# Patient Record
Sex: MALE | Race: WHITE | NOT HISPANIC OR LATINO | Employment: OTHER | URBAN - NONMETROPOLITAN AREA
[De-identification: names, ages, dates, MRNs, and addresses within clinical notes are randomized per-mention and may not be internally consistent; named-entity substitution may affect disease eponyms.]

---

## 2017-02-14 ENCOUNTER — AMBULATORY - GICH (OUTPATIENT)
Dept: FAMILY MEDICINE | Facility: OTHER | Age: 72
End: 2017-02-14

## 2017-02-14 ENCOUNTER — HISTORY (OUTPATIENT)
Dept: FAMILY MEDICINE | Facility: OTHER | Age: 72
End: 2017-02-14

## 2017-11-13 ENCOUNTER — HISTORY (OUTPATIENT)
Dept: FAMILY MEDICINE | Facility: OTHER | Age: 72
End: 2017-11-13

## 2017-11-13 ENCOUNTER — OFFICE VISIT - GICH (OUTPATIENT)
Dept: FAMILY MEDICINE | Facility: OTHER | Age: 72
End: 2017-11-13
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
## 2017-11-13 DIAGNOSIS — Z00.00 ENCOUNTER FOR GENERAL ADULT MEDICAL EXAMINATION WITHOUT ABNORMAL FINDINGS: ICD-10-CM

## 2017-11-13 DIAGNOSIS — Z82.49 FAMILY HISTORY OF ISCHEMIC HEART DISEASE AND OTHER DISEASES OF THE CIRCULATORY SYSTEM: ICD-10-CM

## 2017-11-13 DIAGNOSIS — E89.0 POSTPROCEDURAL HYPOTHYROIDISM: ICD-10-CM

## 2017-11-13 DIAGNOSIS — Z13.220 ENCOUNTER FOR SCREENING FOR LIPOID DISORDERS: ICD-10-CM

## 2017-11-13 LAB
CHOL/HDL RATIO - HISTORICAL: 2.93
CHOLESTEROL TOTAL: 199 MG/DL
HDLC SERPL-MCNC: 68 MG/DL (ref 23–92)
LDLC SERPL CALC-MCNC: 116 MG/DL
NON-HDL CHOLESTEROL - HISTORICAL: 131 MG/DL
PROVIDER ORDERDED STATUS - HISTORICAL: ABNORMAL
TRIGL SERPL-MCNC: 73 MG/DL
TSH - HISTORICAL: 1.51 UIU/ML (ref 0.34–5.6)

## 2017-11-13 ASSESSMENT — PATIENT HEALTH QUESTIONNAIRE - PHQ9: SUM OF ALL RESPONSES TO PHQ QUESTIONS 1-9: 0

## 2017-11-14 ENCOUNTER — HOSPITAL ENCOUNTER (OUTPATIENT)
Dept: RADIOLOGY | Facility: OTHER | Age: 72
End: 2017-11-14
Attending: FAMILY MEDICINE

## 2017-11-14 DIAGNOSIS — Z82.49 FAMILY HISTORY OF ISCHEMIC HEART DISEASE AND OTHER DISEASES OF THE CIRCULATORY SYSTEM: ICD-10-CM

## 2017-11-14 LAB — LYME SCREEN W/REFLEX WEST BLOT - HISTORICAL: NEGATIVE

## 2017-11-15 ENCOUNTER — COMMUNICATION - GICH (OUTPATIENT)
Dept: FAMILY MEDICINE | Facility: OTHER | Age: 72
End: 2017-11-15

## 2017-11-15 DIAGNOSIS — R93.1 ABNORMAL FINDINGS ON DIAGNOSTIC IMAGING OF HEART AND CORONARY CIRCULATION: ICD-10-CM

## 2017-12-27 NOTE — PROGRESS NOTES
Patient Information     Patient Name MRN Sex     Justino Wang [id number] Male 1945      Progress Notes by Fermín Mobley MD at 2017  9:00 AM     Author:  Fermín Mobley MD Service:  (none) Author Type:  Physician     Filed:  2017 11:10 AM Encounter Date:  2017 Status:  Signed     :  Fermín Mobley MD (Physician)            SUBJECTIVE:    Justino Wang is 72 y.o. male who presents for a physical and follow up on hypothyroidism. Taking levothyroxine regularly.    Notes more pain in hands. Wonders about Lyme disease since he is in the woods a lot and wife had Lyme disease before. No knee, shoulder, or hip swelling.    He has a FHx of CAD. Never on a statin due to high HDL. Denies CP or SOB.    PROBLEM LIST:  Patient Active Problem List     Diagnosis  Code     Grave's disease E05.00     Hypothyroidism following radioiodine therapy E89.0     Seborrheic keratoses L82.1     Actinic keratosis of scalp L57.0     Tubular adenoma of colon D12.6     PAST MEDICAL HISTORY:  Past Medical History:     Diagnosis  Date     Atrial fibrillation (HC)     [id number]/02      Grave's disease     I131 ablation       Herpes zoster 1982    right facial      Hypothyroidism following radioiodine therapy      Tubular adenoma of colon 2016     SURGICAL HISTORY:  Past Surgical History:      Procedure  Laterality Date     radiation on thyroid       VASECTOMY         SOCIAL HISTORY:  Social History     Social History        Marital status:       Spouse name: N/A     Number of children:  N/A     Years of education:  N/A     Occupational History      Not on file.     Social History Main Topics         Smoking status:  Never Smoker      Smokeless tobacco:  Never Used      Alcohol use  7.5 oz/week     15 Standard drinks or equivalent per week      Drug use:  No      Sexual activity:  Yes      Partners: Female      Other Topics  Concern     Not on file      Social History  "Narrative     FAMILY HISTORY:  Family History       Problem   Relation Age of Onset     Other  Mother      natural  92       Diabetes  Mother      Other  Father      pancreatic cancer  68  MI age 64        CURRENT MEDICATIONS:   No current outpatient prescriptions on file.     No current facility-administered medications for this visit.      Medications have been reviewed by me and are current to the best of my knowledge and ability.    ALLERGIES:  Review of patient's allergies indicates no known allergies.    REVIEW OF SYSTEMS:  General: Denies general constitutional problems  Eyes: Denies problems  Ears/Nose/Throat: Denies problems  Cardiovascular: Denies problems  Respiratory: Denies problems  Gastrointestinal: Denies problems  Genitourinary: Denies problems  Musculoskeletal: Denies problems  Skin: Denies problems  Neurologic: Denies problems  Psychiatric: Denies problems    PHQ Depression Screening 2017   Date of PHQ exam (doc flow) 2017   1. Lack of interest/pleasure 0 - Not at all   2. Feeling down/depressed 0 - Not at all   PHQ-2 TOTAL SCORE 0   3. Trouble sleeping 0 - Not at all   4. Decreased energy 0 - Not at all   5. Appetite change 0 - Not at all   6. Feelings of failure 0 - Not at all   7. Trouble concentrating 0 - Not at all   8. Activity level 0 - Not at all   9. Hurting yourself 0 - Not at all   PHQ-9 TOTAL SCORE 0   PHQ-9 Severity Level none   Functional Impairment not applicable   Some recent data might be hidden       OBJECTIVE:  /74  Pulse 58  Ht 1.715 m (5' 7.5\")  Wt 98.9 kg (218 lb)  BMI 33.64 kg/m2  EXAM:   General Appearance: Pleasant, alert, appropriate appearance for age. No acute distress  Eye Exam:  Normal external eye, conjunctiva, lids, cornea. ALEJANDRA.  Ear Exam: Normal TM's bilaterally.   OroPharynx Exam:  Dental hygiene adequate. Normal buccal mucosa. Normal pharynx.  Neck Exam:  Supple, no masses or nodes. No carotid bruits.  Thyroid Exam: No nodules or " enlargement.  Chest/Respiratory Exam: Normal chest wall and respirations. Clear to auscultation.  Cardiovascular Exam: Regular rate and rhythm. S1, S2, no murmur, click, gallop, or rubs.  Gastrointestinal Exam: Soft, non-tender, no masses or organomegaly.  Musculoskeletal Exam: Back is straight and non-tender, full ROM of upper and lower extremities.  Foot Exam: Left and right foot: good pedal pulses.  Skin: multiple SK over back. Some small benign nevi. Scalp appears normal,, no concerning lesions.  Neurologic Exam: Nonfocal, symmetric DTRs, normal gross motor, tone coordination and no tremor.  Psychiatric Exam: Alert and oriented - appropriate affect.    Results for orders placed or performed in visit on 11/13/17      TSH      Result  Value Ref Range    TSH 1.51 0.34 - 5.60 uIU/mL   LIPID PANEL      Result  Value Ref Range    CHOLESTEROL,TOTAL 199 <200 mg/dL    TRIGLYCERIDES 73 <150 mg/dL    HDL CHOLESTEROL 68 23 - 92 mg/dL    NON-HDL CHOLESTEROL 131 <145 mg/dl    CHOL/HDL RATIO            2.93 <4.50                    LDL CHOLESTEROL 116 (H) <100 mg/dL    PROVIDER ORDERED STATUS RANDOM    LYME SCREEN W/REFLEX      Result  Value Ref Range    LYME SCREEN W/REFLEX WEST BLOT Negative Negative        ASSESSMENT/PLAN:    ICD-10-CM    1. Annual physical exam Z00.00    2. Screening, lipid Z13.220 LIPID PANEL      LIPID PANEL   3. Family history of heart disease Z82.49 CT CARDIAC CALCIUM SCORE ONLY WO SINGLE READ   4. Hypothyroidism following radioiodine therapy E89.0 levothyroxine (SYNTHROID) 100 mcg tablet      TSH      TSH   5. Tick bite, initial encounter W57.XXXA LYME SCREEN W/REFLEX      LYME SCREEN W/REFLEX     Mr. Longoria Body mass index is Body mass index is 33.64 kg/(m^2). This is out of the normal range for a 72 y.o. Normal range for ages 18-64 is between 18.5 and 24.9; normal range for ages 65+ is 23-30. To lose weight we reviewed risks and benefits of appropriate options such as diet, exercise, and  medications. Patient's strategy will be  self-directed nutrition plan     BP Readings from Last 1 Encounters:11/09/16 : 129/74  Mr. Wang's blood pressure is in the normal range for adults.     Declines flu shot. HM current.    Lipids look good, but has FHx of CAD. His AHA risk is over 20%, but that is based on age. Discussed obtaining a CT calcium score to help grade risk. If elevated, then start statin and aspirin.    TSH in range, refilled levothyroxine.    He was curious about Lyme disease causing hand pain. Unlikely, but elected to check Lyme for reassurance. Returned negative.    F/U 1 year.

## 2017-12-28 NOTE — PROGRESS NOTES
Patient Information     Patient Name MRN Sex Justino Wu [id number] Male 1945      Progress Notes by Nery Woodward at 2017  8:03 AM     Author:  Nery Woodward Service:  (none) Author Type:  Other Clinical Staff     Filed:  2017  8:03 AM Date of Service:  2017  8:03 AM Status:  Signed     :  Nery Woodward (Other Clinical Staff)            Falls Risk Criteria:    Age 65 and older or under age 4        Sensory deficits    Poor vision    Use of ambulatory aides    Impaired judgment    Unable to walk independently    Meets High Risk criteria for falls:  Yes             1.  Do you have dizziness or vertigo?    no                    2.  Do you need help standing or walking?   no                 3.  Have you fallen within the last 6 months?    no           4.  Has the patient been fasting?      no       If any risks are marked Yes, the following interventions are utilized:    Do not leave patient unattended     Assist patient in the dressing room and bathroom    Have ambulatory aides available throughout procedure    Involve patient s family if available

## 2017-12-28 NOTE — PATIENT INSTRUCTIONS
Patient Information     Patient Name MRN Sex Justino Wu [id number] Male 1945      Patient Instructions by Fermín Mobley MD at 2017  9:00 AM     Author:  Fermín Mobley MD Service:  (none) Author Type:  Physician     Filed:  2017  9:45 AM Encounter Date:  2017 Status:  Signed     :  Fermín Mobley MD (Physician)            Letter with lab results  Consider the CT scan for calcium buildup in the heart arteries  Continue the levothyroxine

## 2017-12-30 NOTE — NURSING NOTE
Patient Information     Patient Name MRN Sex Justino Wu [id number] Male 1945      Nursing Note by Chantal Shaffer at 2017  9:00 AM     Author:  Chantal Shaffer Service:  (none) Author Type:  (none)     Filed:  2017  8:52 AM Encounter Date:  2017 Status:  Signed     :  Chantal Shaffer            Justino Wang is a 72 y.o. male presenting for his annual physical  Chantal Shaffer LPN 2017 8:38 AM

## 2018-01-01 ENCOUNTER — COMMUNICATION - GICH (OUTPATIENT)
Dept: FAMILY MEDICINE | Facility: OTHER | Age: 73
End: 2018-01-01

## 2018-01-01 DIAGNOSIS — E89.0 POSTPROCEDURAL HYPOTHYROIDISM: ICD-10-CM

## 2018-01-19 ENCOUNTER — DOCUMENTATION ONLY (OUTPATIENT)
Dept: FAMILY MEDICINE | Facility: OTHER | Age: 73
End: 2018-01-19

## 2018-01-19 PROBLEM — E05.00 GRAVES' DISEASE: Status: ACTIVE | Noted: 2018-01-19

## 2018-01-19 PROBLEM — R93.1 ELEVATED CORONARY ARTERY CALCIUM SCORE: Status: ACTIVE | Noted: 2017-11-15

## 2018-01-19 PROBLEM — E89.0 HYPOTHYROIDISM FOLLOWING RADIOIODINE THERAPY: Status: ACTIVE | Noted: 2018-01-19

## 2018-01-19 RX ORDER — LEVOTHYROXINE SODIUM 100 UG/1
100 TABLET ORAL
COMMUNITY
Start: 2017-11-13 | End: 2018-11-14

## 2018-01-19 RX ORDER — ASPIRIN 81 MG/1
81 TABLET ORAL
COMMUNITY
Start: 2017-11-16

## 2018-01-19 RX ORDER — ATORVASTATIN CALCIUM 40 MG/1
40 TABLET, FILM COATED ORAL
COMMUNITY
Start: 2017-11-15 | End: 2018-11-14

## 2018-01-26 VITALS
DIASTOLIC BLOOD PRESSURE: 74 MMHG | SYSTOLIC BLOOD PRESSURE: 129 MMHG | WEIGHT: 218 LBS | HEIGHT: 68 IN | BODY MASS INDEX: 33.04 KG/M2 | HEART RATE: 58 BPM

## 2018-01-29 ASSESSMENT — PATIENT HEALTH QUESTIONNAIRE - PHQ9: SUM OF ALL RESPONSES TO PHQ QUESTIONS 1-9: 0

## 2018-02-12 NOTE — TELEPHONE ENCOUNTER
Patient Information     Patient Name MRN Sex Justino Wu [id number] Male 1945      Telephone Encounter by Antoinette Price RN at 2018  8:42 AM     Author:  Antoinette Price RN Service:  (none) Author Type:  NURS- Registered Nurse     Filed:  2018  8:44 AM Encounter Date:  2018 Status:  Signed     :  Antoinette Price RN (NURS- Registered Nurse)            Redundant Refill Request refused;    Medication:levothyroxine (SYNTHROID) 100 mcg tablet    Qty:90 tablet   Ref:4  Start:2017  End:              Route:Oral                  JUSTICE:No   Class:eRx    Sig:Take 1 tablet by mouth before breakfast.    Pharmacy:[place] [id number] [place] [address]    Unable to complete prescription refill per RN Medication Refill Policy.................... Antoinette Price RN ....................  2018   8:43 AM

## 2018-02-14 ENCOUNTER — DOCUMENTATION ONLY (OUTPATIENT)
Dept: OTHER | Facility: CLINIC | Age: 73
End: 2018-02-14

## 2018-02-14 PROBLEM — Z71.89 ACP (ADVANCE CARE PLANNING): Chronic | Status: ACTIVE | Noted: 2018-02-14

## 2018-03-28 ENCOUNTER — TELEPHONE (OUTPATIENT)
Dept: FAMILY MEDICINE | Facility: OTHER | Age: 73
End: 2018-03-28

## 2018-03-28 NOTE — TELEPHONE ENCOUNTER
I think he needs to avoid maximal exertion. If his heart rate gets too close to maximum, this would be like a stress test and since he's not had one, we want to avoid getting too high of a heart rate without monitoring. So, I would say most exercise programs will not be an issue and so generally there is no restriction.

## 2018-03-28 NOTE — TELEPHONE ENCOUNTER
Patient wondering if there is any restriction in joining an exercise program.  Chantal Shaffer LPN 3/28/2018 3:17 PM

## 2018-03-29 NOTE — TELEPHONE ENCOUNTER
Patient wife notified that we will need to speak directly to patient.  She will have him call.  BERNARDINO Lorenzo........................3/29/2018  1:25 PM

## 2018-07-10 ENCOUNTER — HOSPITAL ENCOUNTER (EMERGENCY)
Facility: OTHER | Age: 73
Discharge: HOME | End: 2018-07-10
Attending: FAMILY MEDICINE | Admitting: FAMILY MEDICINE
Payer: MEDICARE

## 2018-07-10 ENCOUNTER — APPOINTMENT (OUTPATIENT)
Dept: GENERAL RADIOLOGY | Facility: OTHER | Age: 73
End: 2018-07-10
Attending: FAMILY MEDICINE
Payer: MEDICARE

## 2018-07-10 ENCOUNTER — TRANSFERRED RECORDS (OUTPATIENT)
Dept: HEALTH INFORMATION MANAGEMENT | Facility: OTHER | Age: 73
End: 2018-07-10

## 2018-07-10 VITALS
TEMPERATURE: 97.1 F | DIASTOLIC BLOOD PRESSURE: 83 MMHG | OXYGEN SATURATION: 96 % | HEIGHT: 68 IN | RESPIRATION RATE: 99 BRPM | WEIGHT: 212 LBS | SYSTOLIC BLOOD PRESSURE: 150 MMHG | BODY MASS INDEX: 32.13 KG/M2

## 2018-07-10 DIAGNOSIS — S52.022A OLECRANON FRACTURE, LEFT, CLOSED, INITIAL ENCOUNTER: Primary | ICD-10-CM

## 2018-07-10 PROCEDURE — 99284 EMERGENCY DEPT VISIT MOD MDM: CPT | Mod: 25 | Performed by: FAMILY MEDICINE

## 2018-07-10 PROCEDURE — 96372 THER/PROPH/DIAG INJ SC/IM: CPT | Performed by: FAMILY MEDICINE

## 2018-07-10 PROCEDURE — 25000125 ZZHC RX 250: Performed by: FAMILY MEDICINE

## 2018-07-10 PROCEDURE — 99283 EMERGENCY DEPT VISIT LOW MDM: CPT | Mod: 25 | Performed by: FAMILY MEDICINE

## 2018-07-10 PROCEDURE — 25000128 H RX IP 250 OP 636: Performed by: FAMILY MEDICINE

## 2018-07-10 PROCEDURE — 73080 X-RAY EXAM OF ELBOW: CPT | Mod: LT

## 2018-07-10 PROCEDURE — 99283 EMERGENCY DEPT VISIT LOW MDM: CPT | Mod: Z6 | Performed by: FAMILY MEDICINE

## 2018-07-10 RX ORDER — GINSENG 100 MG
CAPSULE ORAL ONCE
Status: COMPLETED | OUTPATIENT
Start: 2018-07-10 | End: 2018-07-10

## 2018-07-10 RX ORDER — CEFTRIAXONE SODIUM 1 G
1 VIAL (EA) INJECTION ONCE
Status: COMPLETED | OUTPATIENT
Start: 2018-07-10 | End: 2018-07-10

## 2018-07-10 RX ADMIN — Medication 3 ML: at 09:00

## 2018-07-10 RX ADMIN — BACITRACIN: 500 OINTMENT TOPICAL at 09:52

## 2018-07-10 RX ADMIN — CEFTRIAXONE SODIUM 1 G: 1 INJECTION, POWDER, FOR SOLUTION INTRAMUSCULAR; INTRAVENOUS at 10:31

## 2018-07-10 NOTE — ED TRIAGE NOTES
Patient arrives from home with c/o left elbow pain. He tripped and fell last evening landing on gravel and cement on his left arm. He comes in the it wrapped up as the bleeding would not stop. Dressing removed, small puncture wound noted that is actively bleeding. He is c/o left elbow and lower arm pain.

## 2018-07-10 NOTE — ED AVS SNAPSHOT
Justino Wang #[id number] (CSN: [id number])  (73 year old M)  (Adm: 07/10/18)     [id number]               [facility]: [phone]            Patient Demographics     Patient Name Sex          Age SSN Address Phone    Justino Wang Male 1945 (73 year old) [id number] [facility] [address] [phone] (Home)  [phone] (Mobile)      PCP and Center    Primary Care Provider  Phone Center     HymanChristian [phone] [facility] [address] [facility] *        Emergency Contact(s)     Name Relation Home Work Mobile    KathleenMary Raciel [phone]    Justino Wang  [phone]      Marley Valenzuela  [phone]        Admission Information     Attending Provider Admitting Provider Admission Type Admission Date/Time    Gerber Araiza MD  Emergency 07/10/18  0833    Discharge Date Hospital Service Auth/Cert Status Service Area     Emergency Medicine Incomplete [facility]    Unit Room/Bed Admission Status        EMERGENCY DEPT ED09/ED09 Admission (Confirmed)       Admission     Complaint    None      Hospital Account     Name Acct ID Class Status Primary Coverage    Justino Wang [id number] Emergency Open MEDICARE - MEDICARE            Guarantor Account (for Hospital Account #[id number])     Name Relation to Pt Service Area Active? Acct Type    Justino Wang Self FCS Yes Personal/Family    Address Phone          [address] [place] [address] [phone](H)              Coverage Information (for Hospital Account #[id number])     1. MEDICARE/MEDICARE     F/O Payor/Plan Precert #    MEDICARE/MEDICARE     Subscriber Subscriber #    Justino Wang [id number]    Address Phone    [employer]  [address]  [place] [address] [phone]          2. [place]     F/O Payor/Plan Precert #    [place]     Subscriber Subscriber #    Kathleen  "Justino CAMARILLO [id number]    Address Phone    [address] [address]  [place] [address] [phone]                                                      INTERAGENCY TRANSFER FORM - PHYSICIAN ORDERS   7/10/2018                       [facility]: [phone]            Attending Provider: Gerber Araiza MD     Allergies:  No Known Allergies    Infection:  None   Service:  EMERGENCY ME    Ht:  1.727 m (5' 8\")   Wt:  96.2 kg (212 lb)   Admission Wt:  96.2 kg (212 lb)    BMI:  32.23 kg/m 2   BSA:  2.15 m 2            ED Clinical Impression     Diagnosis Description Comment Added By Time Added    Olecranon fracture, left, closed, initial encounter [S52.022A] Olecranon fracture, left, closed, initial encounter [S52.022A]  Gerber Araiza MD 7/10/2018 11:19 AM      Hospital Problems as of 7/10/2018     None      Non-Hospital Problems as of 7/10/2018              Priority Class Noted    Tubular adenoma of colon Medium  1/11/2016    Actinic keratosis of scalp Medium  11/9/2016    Seborrheic keratoses Medium  11/9/2016    Elevated coronary artery calcium score Medium  11/15/2017    Graves' disease Medium  1/19/2018    Hypothyroidism following radioiodine therapy Medium  1/19/2018    ACP (advance care planning) Medium  2/14/2018      Code Status History     This patient does not have a recorded code status. Please follow your organizational policy for patients in this situation.      Current Code Status     This patient does not have a recorded code status. Please follow your organizational policy for patients in this situation.         Medication Review      UNREVIEWED medications. Ask your doctor about these medications        Dose / Directions Comments    aspirin 81 MG EC tablet        Dose:  81 mg   Take 81 mg by mouth daily with food Take 1 tablet by mouth once daily with a meal.   Refills:  0        atorvastatin 40 MG tablet   Commonly known as:  LIPITOR        Dose:  40 mg   Take 40 mg by mouth daily " "(with dinner) Take 1 tablet by mouth once daily with evening meal.   Refills:  0        levothyroxine 100 MCG tablet   Commonly known as:  SYNTHROID/LEVOTHROID        Dose:  100 mcg   Take 100 mcg by mouth every morning (before breakfast) Take 1 tablet by mouth before breakfast.   Refills:  0                                                    INTERAGENCY TRANSFER FORM - NURSING   7/10/2018                       [facility]: [phone]            Attending Provider: Gerber Araiza MD     Allergies:  No Known Allergies    Infection:  None   Service:  EMERGENCY ME    Ht:  1.727 m (5' 8\")   Wt:  96.2 kg (212 lb)   Admission Wt:  96.2 kg (212 lb)    BMI:  32.23 kg/m 2   BSA:  2.15 m 2            Advance Directives        Scanned docmt in ACP Activity?           Yes, scanned ACP docmt        Immunizations     None      ASSESSMENT     Discharge Profile Flowsheet     COMMUNICATION ASSESSMENT     Procedural focused assessment (identify areas inspected)   Elbow, left 07/10/18 0843    Patient's communication style  spoken language (English or Bilingual) 07/10/18 0833   Skin WDL  ex 07/10/18 0843    SKIN     Skin Integrity  wound(s) 07/10/18 0843    Inspection of bony prominences  Procedural focused assessment (identify areas inspected) 07/10/18 0843                      Assessment WDL (Within Defined Limits) Definitions           Skin WDL     Effective: 09/28/15    Row Information: <b>WDL Definition:</b> Warm; dry; intact; elastic; without discoloration; pressure points without redness<br> <font color=\"gray\"><i>Item=AS skin wdl>>List=AS skin wdl>>Version=F14</i></font>      Vitals     Vital Signs Flowsheet     VITAL SIGNS     FACES Pain Rating  0-->no hurt 07/10/18 0834    Temp  97.1  F (36.2  C) 07/10/18 0834   HEIGHT AND WEIGHT      Temp src  Tympanic 07/10/18 0834   Height  1.727 m (5' 8\") 07/10/18 0834    Resp  (!)  99 07/10/18 0834   Height Method  Stated 07/10/18 0834    Heart Rate  72 07/10/18 " 0834   Weight  96.2 kg (212 lb) 07/10/18 0834    Pulse/Heart Rate Source  Monitor 07/10/18 0834   BSA (Calculated - sq m)  2.15 07/10/18 0834    BP  150/83 07/10/18 1122   BMI (Calculated)  32.3 07/10/18 0834    OXYGEN THERAPY     RAUL COMA SCALE      SpO2  96 % 07/10/18 1122   Best Eye Response  4-->(E4) spontaneous 07/10/18 0834    O2 Device  None (Room air) 07/10/18 0834   Best Motor Response  6-->(M6) obeys commands 07/10/18 0834    PAIN/COMFORT     Best Verbal Response  5-->(V5) oriented 07/10/18 0834    Patient's Stated Pain Goal  No pain 07/10/18 0834   [place] Coma Scale Score  15 07/10/18 0834    0-10 Pain Scale  3 07/10/18 0834                 Patient Lines/Drains/Airways Status    Active LINES/DRAINS/AIRWAYS     None            Patient Lines/Drains/Airways Status    Active PICC/CVC     None            Intake/Output Detail Report     None      Case Management/Discharge Planning     Case Management/Discharge Planning Flowsheet     ABUSE RISK SCREEN     OBSERVATION: Is there reason to believe there has been maltreatment of a vulnerable adult (ie. Physical/Sexual/Emotional abuse, self neglect, lack of adequate food, shelter, medical care, or financial exploitation)?  no 07/10/18 0836    QUESTION TO PATIENT:  Has a member of your family or a partner(now or in the past) intimidated, hurt, manipulated, or controlled you in any way?  no 07/10/18 0836   HOMICIDE RISK      QUESTION TO PATIENT: Do you feel safe going back to the place where you are living?  yes 07/10/18 0836   Feels Like Hurting Others  no 07/10/18 0836                  [facility]: [phone]            Medication Administration Report for Justino Wang as of 07/10/18 1123   Legend:    Given Hold Not Given Due Canceled Entry Other Actions    Time Time (Time) Time  Time-Action       Inactive    Active    Linked        Medications 07/04/18 07/05/18 07/06/18 07/07/18 07/08/18 07/09/18 07/10/18   Completed Medications       Freq: ONCE Route: Top  Start: 07/10/18 0858   End: 07/10/18 0952   Admin Instructions: Apply to affected area    Last Admin: 07/10/18 0952  Dispense Loc: EMERGENCY DEPARTMENT  Administrations Remainin           0952 ( )-Given             Dose: 1 g  Freq: ONCE Route: IM  Indications of Use: SKIN AND SOFT TISSUE INFECTION  Start: 07/10/18 1023   End: 07/10/18 1031   Admin Instructions: Reconstitute 1 g vial with 2.1 mL of NS to yield a concentration of 350 mg/mL.    Admin. Amount: 1 g = 2.857 mL Conc: 0.35 g/mL  Last Admin: 07/10/18 103  Dispense Loc: EMERGENCY DEPARTMENT  Administrations Remainin  Volume: 2.857 mL           1031 (1 g)-Given             Dose: 3 mL  Freq: ONCE Route: Top  Start: 07/10/18 0858   End: 07/10/18 0900   Admin. Amount: 3 mL  Last Admin: 07/10/18 0900  Dispense Loc: EMERGENCY DEPARTMENT  Administrations Remainin  Volume: 3 mL           0900 (3 mL)-Given                    INTERAGENCY TRANSFER FORM - NOTES (H&P, Discharge Summary, Consults, Procedures, Therapies)   7/10/2018                       [facility]: [phone]            History & Physicals     No notes of this type exist for this encounter.      Discharge Summaries     No notes of this type exist for this encounter.      Consult Notes     No notes of this type exist for this encounter.      Progress Notes - Physician (Notes for yesterday and today)     No notes of this type exist for this encounter.      Procedure Notes     No notes of this type exist for this encounter.      Progress Notes - Therapies (Notes from 18 through 07/10/18)     No notes of this type exist for this encounter.                                          INTERAGENCY TRANSFER FORM - LAB / IMAGING / EKG / EMG RESULTS   7/10/2018                       [facility]: [phone]            Unresulted Labs     None         Imaging Results - 3 Days      XR Elbow Left G/E 3 Views [[id number]]  Resulted:  07/10/18 0953, Result status: Final result    Ordering provider: Gerber Araiza MD  07/10/18 0854 Resulted by: Meghan Albert MD    Performed: 07/10/18 0927 - 07/10/18 0949 Resulting lab: RADIOLOGY RESULTS    Narrative:       PROCEDURE:  XR ELBOW LT G/E 3 VW    HISTORY: r/o fracture;     COMPARISON:  None.    TECHNIQUE:  3 views of the left elbow were obtained.    FINDINGS:  There is a displaced fracture of the olecranon which is  displaced proximally by 1.7 cm. There is a marked soft tissue  laceration in the arm. No radiopaque foreign body.       Impression:       IMPRESSION: Displaced olecranon fracture. Adjacent soft tissue  laceration, raising the possibility of an open fracture.      MEGHAN ALBERT MD      Testing Performed By     Lab - Abbreviation Name Director Address Valid Date Range    104 - Rad Rslts RADIOLOGY RESULTS Unknown Unknown 02/16/05 1553 - Present            Encounter-Level Documents:     There are no encounter-level documents.      Order-Level Documents:     There are no order-level documents.

## 2018-07-10 NOTE — ED NOTES
Patient requesting to drive home and his wife will drive him to [facility] from there. Ok per Dr. Araiza. Transfer paperwork given to patient.

## 2018-07-10 NOTE — ED NOTES
Left elbow wound irrigated with sterile water. Bacitracin applied and wrapped with gauze and kerlix. Patient tolerated, declines pain medication. Pain 2/10. Waiting for results from Provider.

## 2018-07-10 NOTE — ED PROVIDER NOTES
History     Chief Complaint   Patient presents with     Arm Pain     HPI  Justino Wang is a 73 year old male who since the emergency department after a fall on his left elbow yesterday.  He did not hit his head or lose consciousness.  Reviewed nurse's notes below similar history related to me.  Patient arrives from home with c/o left elbow pain. He tripped and fell last evening landing on gravel and cement on his left arm. He comes in the it wrapped up as the bleeding would not stop. Dressing removed, small puncture wound noted that is actively bleeding. He is c/o left elbow and lower arm pain.                   Problem List:    Patient Active Problem List    Diagnosis Date Noted     ACP (advance care planning) 2018     Priority: Medium     Graves' disease 2018     Priority: Medium     Overview:   I131 ablation 2002       Hypothyroidism following radioiodine therapy 2018     Priority: Medium     Elevated coronary artery calcium score 11/15/2017     Priority: Medium     Actinic keratosis of scalp 2016     Priority: Medium     Seborrheic keratoses 2016     Priority: Medium     Tubular adenoma of colon 2016     Priority: Medium        Past Medical History:    Past Medical History:   Diagnosis Date     Atrial fibrillation (H)      Benign neoplasm of colon      Postprocedural hypothyroidism      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      Zoster without complications        Past Surgical History:    Past Surgical History:   Procedure Laterality Date     OTHER SURGICAL HISTORY      [id number],OTHER     VASECTOMY      No Comments Provided       Family History:    Family History   Problem Relation Age of Onset     Other - See Comments Mother      natural  92     Diabetes Mother      Diabetes     Other - See Comments Father      pancreatic cancer  68     HEART DISEASE Father 64     Heart Disease,MI age 64       Social History:  Marital Status:  Unknown  "[6]  Social History   Substance Use Topics     Smoking status: Never Smoker     Smokeless tobacco: Never Used     Alcohol use 7.5 oz/week      Comment: Daily 2 drinks        Medications:      aspirin EC 81 MG EC tablet   atorvastatin (LIPITOR) 40 MG tablet   levothyroxine (SYNTHROID/LEVOTHROID) 100 MCG tablet         Review of Systems  No fevers chills or shakes no nausea vomiting or diarrhea  Physical Exam   BP: 154/84  Heart Rate: 72  Temp: 97.1  F (36.2  C)  Resp: (!) 99  Height: 172.7 cm (5' 8\")  Weight: 96.2 kg (212 lb)  SpO2: 95 %      Physical Exam   Cardiovascular: Normal rate.    No murmur heard.  Pulmonary/Chest: Effort normal and breath sounds normal. No respiratory distress. He has no wheezes.   Abdominal: Soft. He exhibits no distension. There is no tenderness.   Musculoskeletal: He exhibits tenderness.        Left elbow: He exhibits decreased range of motion, swelling and laceration.   Nursing note and vitals reviewed.  1 cm linear laceration, no foreign body obvious.  Laceration will not be closed due to concern for infection.    ED Course     ED Course     Procedures            Results for orders placed or performed during the hospital encounter of 07/10/18 (from the past 24 hour(s))   XR Elbow Left G/E 3 Views    Narrative    PROCEDURE:  XR ELBOW LT G/E 3 VW    HISTORY: r/o fracture;     COMPARISON:  None.    TECHNIQUE:  3 views of the left elbow were obtained.    FINDINGS:  There is a displaced fracture of the olecranon which is  displaced proximally by 1.7 cm. There is a marked soft tissue  laceration in the arm. No radiopaque foreign body.       Impression    IMPRESSION: Displaced olecranon fracture. Adjacent soft tissue  laceration, raising the possibility of an open fracture.      MEGHAN HERNANDEZ MD       Medications   bacitracin ointment ( Topical Given 7/10/18 0952)   lidocaine/EPINEPHrine/tetracaine (LET) solution KIT 3 mL (3 mLs Topical Given 7/10/18 0900)   cefTRIAXone (ROCEPHIN) injection " 1 g (1 g Intramuscular Given 7/10/18 1031)       Assessments & Plan (with Medical Decision Making)       New Prescriptions    No medications on file       Final diagnoses:   Olecranon fracture, left, closed, initial encounter   [facility] will [facility] patient today.  We are both comfortable with the patient coming down by private vehicle but I did complete transfer paperwork.  X-rays have already been pushed down to [facility] and they were available for Dr. udkes consultation.  She verbalizes understanding of plan, his wife will bring him to [facility].  Dr. pitts anticipates open reduction internal fixation and irrigation today.  Patient is n.p.o., importance of n.p.o. status reinforced prior to his transfer as well.    7/10/2018   [facility]  07/10/18 4569

## 2018-07-10 NOTE — ED NOTES
Water taken away, patient waiting for wife. She will transport him to [facility] via private vehicle.

## 2018-07-11 ENCOUNTER — TRANSFERRED RECORDS (OUTPATIENT)
Dept: HEALTH INFORMATION MANAGEMENT | Facility: OTHER | Age: 73
End: 2018-07-11

## 2018-07-23 NOTE — PROGRESS NOTES
Patient Information     Patient Name  Justino Wang MRN  [id number] Sex  Male   1945      Letter by Fermín Mobley MD at      Author:  Fermín Mobley MD Service:  (none) Author Type:  (none)    Filed:   Encounter Date:  11/15/2017 Status:  (Other)           Justino Wang  [address] [facility] [address]          November 15, 2017    Dear Mr. Wang:    As we discussed the CT scan showed a large amount of calcium buildup in all of your heart arteries. The calcium indicates plaque buildup and so this puts you at high risk for heart attack. Start taking aspirin 81 mg and Lipitor 40 mg each evening. Checking on labs is of little benefit until next year and typically we do not repeat the scan as this would not change my recommendations to keep on the cholesterol medication.    Please contact me if you have any questions.    Sincerely,      Fermín Mobley MD  Reviewed and electronically signed by provider.

## 2018-07-24 NOTE — PROGRESS NOTES
Patient Information     Patient Name  Justino Wang MRN  [id number] Sex  Male   1945      Letter by Fermín Mobley MD at      Author:  Fermín Mobley MD Service:  (none) Author Type:  (none)    Filed:   Encounter Date:  2017 Status:  (Other)           Justino Wang  [address] [facility] [address]          2017    Dear Mr. Wang:    Your labs are included below. The thyroid test is normal. Lyme testing is negative. Cholesterol values look similar to your previous values with a high HDL, or good cholesterol, which is protective against heart attack and stroke. However, with a family history of heart disease the CT scan of calcium buildup in your heart arteries could help grade your risk for heart attack in the future. It is ordered and we will let you know about results when completed.    Please contact me if you have any questions.     Sincerely,      Fermín Mobley MD  Reviewed and electronically signed by provider.    Results for orders placed or performed in visit on 17      TSH      Result  Value Ref Range    TSH 1.51 0.34 - 5.60 uIU/mL   LIPID PANEL      Result  Value Ref Range    CHOLESTEROL,TOTAL 199 <200 mg/dL    TRIGLYCERIDES 73 <150 mg/dL    HDL CHOLESTEROL 68 23 - 92 mg/dL    NON-HDL CHOLESTEROL 131 <145 mg/dl    CHOL/HDL RATIO            2.93 <4.50                    LDL CHOLESTEROL 116 (H) <100 mg/dL    PROVIDER ORDERED STATUS RANDOM    LYME SCREEN W/REFLEX      Result  Value Ref Range    LYME SCREEN W/REFLEX WEST BLOT Negative Negative

## 2018-07-30 ENCOUNTER — TRANSFERRED RECORDS (OUTPATIENT)
Dept: HEALTH INFORMATION MANAGEMENT | Facility: OTHER | Age: 73
End: 2018-07-30

## 2018-08-27 ENCOUNTER — TRANSFERRED RECORDS (OUTPATIENT)
Dept: HEALTH INFORMATION MANAGEMENT | Facility: OTHER | Age: 73
End: 2018-08-27

## 2018-09-24 ENCOUNTER — TRANSFERRED RECORDS (OUTPATIENT)
Dept: HEALTH INFORMATION MANAGEMENT | Facility: OTHER | Age: 73
End: 2018-09-24

## 2018-11-14 ENCOUNTER — OFFICE VISIT (OUTPATIENT)
Dept: FAMILY MEDICINE | Facility: OTHER | Age: 73
End: 2018-11-14
Attending: FAMILY MEDICINE
Payer: MEDICARE

## 2018-11-14 VITALS
TEMPERATURE: 97 F | RESPIRATION RATE: 16 BRPM | DIASTOLIC BLOOD PRESSURE: 78 MMHG | HEART RATE: 78 BPM | SYSTOLIC BLOOD PRESSURE: 132 MMHG | BODY MASS INDEX: 33.19 KG/M2 | HEIGHT: 68 IN | WEIGHT: 219 LBS

## 2018-11-14 DIAGNOSIS — E89.0 HYPOTHYROIDISM FOLLOWING RADIOIODINE THERAPY: ICD-10-CM

## 2018-11-14 DIAGNOSIS — I70.90 ATHEROSCLEROSIS: ICD-10-CM

## 2018-11-14 DIAGNOSIS — Z51.81 ENCOUNTER FOR THERAPEUTIC DRUG MONITORING: ICD-10-CM

## 2018-11-14 DIAGNOSIS — Z00.00 ANNUAL PHYSICAL EXAM: Primary | ICD-10-CM

## 2018-11-14 DIAGNOSIS — R93.1 ELEVATED CORONARY ARTERY CALCIUM SCORE: ICD-10-CM

## 2018-11-14 DIAGNOSIS — Z23 NEED FOR SHINGLES VACCINE: ICD-10-CM

## 2018-11-14 LAB
ALBUMIN SERPL-MCNC: 4 G/DL (ref 3.5–5.7)
ALP SERPL-CCNC: 75 U/L (ref 34–104)
ALT SERPL W P-5'-P-CCNC: 12 U/L (ref 7–52)
ANION GAP SERPL CALCULATED.3IONS-SCNC: 7 MMOL/L (ref 3–14)
AST SERPL W P-5'-P-CCNC: 15 U/L (ref 13–39)
BILIRUB SERPL-MCNC: 0.7 MG/DL (ref 0.3–1)
BUN SERPL-MCNC: 14 MG/DL (ref 7–25)
CALCIUM SERPL-MCNC: 9.3 MG/DL (ref 8.6–10.3)
CHLORIDE SERPL-SCNC: 105 MMOL/L (ref 98–107)
CHOLEST SERPL-MCNC: 139 MG/DL
CO2 SERPL-SCNC: 28 MMOL/L (ref 21–31)
CREAT SERPL-MCNC: 0.87 MG/DL (ref 0.7–1.3)
GFR SERPL CREATININE-BSD FRML MDRD: 86 ML/MIN/1.7M2
GLUCOSE SERPL-MCNC: 114 MG/DL (ref 70–105)
HDLC SERPL-MCNC: 61 MG/DL (ref 23–92)
LDLC SERPL CALC-MCNC: 65 MG/DL
NONHDLC SERPL-MCNC: 78 MG/DL
POTASSIUM SERPL-SCNC: 4.3 MMOL/L (ref 3.5–5.1)
PROT SERPL-MCNC: 7 G/DL (ref 6.4–8.9)
SODIUM SERPL-SCNC: 140 MMOL/L (ref 134–144)
TRIGL SERPL-MCNC: 64 MG/DL
TSH SERPL DL<=0.05 MIU/L-ACNC: 1.79 IU/ML (ref 0.34–5.6)

## 2018-11-14 PROCEDURE — 84443 ASSAY THYROID STIM HORMONE: CPT | Performed by: FAMILY MEDICINE

## 2018-11-14 PROCEDURE — 80061 LIPID PANEL: CPT | Performed by: FAMILY MEDICINE

## 2018-11-14 PROCEDURE — G0463 HOSPITAL OUTPT CLINIC VISIT: HCPCS

## 2018-11-14 PROCEDURE — 36415 COLL VENOUS BLD VENIPUNCTURE: CPT | Performed by: FAMILY MEDICINE

## 2018-11-14 PROCEDURE — 80053 COMPREHEN METABOLIC PANEL: CPT | Performed by: FAMILY MEDICINE

## 2018-11-14 PROCEDURE — 99397 PER PM REEVAL EST PAT 65+ YR: CPT | Performed by: FAMILY MEDICINE

## 2018-11-14 RX ORDER — LEVOTHYROXINE SODIUM 100 UG/1
100 TABLET ORAL
Qty: 90 TABLET | Refills: 3 | Status: SHIPPED | OUTPATIENT
Start: 2018-11-14 | End: 2019-11-29

## 2018-11-14 RX ORDER — ATORVASTATIN CALCIUM 80 MG/1
80 TABLET, FILM COATED ORAL
Qty: 90 TABLET | Refills: 4 | Status: SHIPPED | OUTPATIENT
Start: 2018-11-14 | End: 2019-11-29

## 2018-11-14 ASSESSMENT — PAIN SCALES - GENERAL: PAINLEVEL: NO PAIN (0)

## 2018-11-14 NOTE — PROGRESS NOTES
SUBJECTIVE:   CC: Justino Wang is an 73 year old male who presents for preventative health visit.     Healthy Habits:    Do you get at least three servings of calcium containing foods daily (dairy, green leafy vegetables, etc.)? yes    Amount of exercise or daily activities, outside of work: stairs at work, not a lot of extra exercise    Problems taking medications regularly No    Medication side effects: No    Have you had an eye exam in the past two years? yes    Do you see a dentist twice per year? yes    Do you have sleep apnea, excessive snoring or daytime drowsiness? Snores, no witnessed apnea     If you drink alcohol do you typically have >3 drinks per day or >7 drinks per week? No       Today's PHQ-2 Score:   PHQ-2 ( 1999 [employer]) 11/14/2018   Q1: Little interest or pleasure in doing things 0   Q2: Feeling down, depressed or hopeless 0   PHQ-2 Score 0       Other problems:   Discussed calcium score of over 3000 last year and was started on aspirin and a statin. Not on either previously.    Had an elbow fracture in July, much improved.    Hx of thyroid ablation and on a stable dose of levothyroxine.      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       atorvastatin (LIPITOR) 40 MG tablet Take 40 mg by mouth daily (with dinner) Take 1 tablet by mouth once daily with evening meal.       levothyroxine (SYNTHROID/LEVOTHROID) 100 MCG tablet Take 100 mcg by mouth every morning (before breakfast) Take 1 tablet by mouth before breakfast.       No Known Allergies    Social History   Substance Use Topics     Smoking status: Never Smoker     Smokeless tobacco: Never Used     Alcohol use 7.5 oz/week      Comment: Daily 2 drinks        Reviewed and updated as needed this visit by clinical staff  Tobacco  Allergies  Meds  Med Hx  Surg Hx  Fam Hx  Soc Hx        Reviewed and updated as needed this visit by Provider  Allergies  Meds  Problems  Med Hx  Surg Hx  Fam Hx            ROS:   General: Denies general constitutional problems  Eyes: Denies problems  Ears/Nose/Throat: Denies problems  Cardiovascular: Denies problems  Respiratory: Denies problems  Gastrointestinal: Denies problems  Genitourinary: Denies problems  Musculoskeletal: Denies problems  Skin: Denies problems  Neurologic: Denies problems  Psychiatric: Denies problems      OBJECTIVE:   /78 (BP Location: Right arm, Patient Position: Chair, Cuff Size: Adult Regular)  Pulse 78  Temp 97  F (36.1  C) (Tympanic)  Resp 16  Ht 5' 7.5\" (1.715 m)  Wt 219 lb (99.3 kg)  BMI 33.79 kg/m2  EXAM:  General Appearance: Pleasant, alert, appropriate appearance for age. No acute distress  Eye Exam:  Normal external eye, conjunctiva, lids, cornea. ALEJANDRA.  Ear Exam: Normal TM's bilaterally. Normal auditory canals and external ears.  OroPharynx Exam:  Dental hygiene adequate. Normal buccal mucosa. Normal pharynx.  Neck Exam:  Supple, no masses or nodes. No carotid bruits.  Thyroid Exam: No nodules or enlargement.  Chest/Respiratory Exam: Normal chest wall and respirations. Clear to auscultation.  Cardiovascular Exam: Regular rate and rhythm. S1, S2, no murmur, click, gallop, or " rubs.  Gastrointestinal Exam: Soft, non-tender, no masses or organomegaly.  Musculoskeletal Exam: Back is straight and non-tender, full ROM of upper and lower extremities.  Foot Exam: Left and right foot: good pedal pulses.  Skin: no rash or abnormalities  Neurologic Exam: Nonfocal, symmetric DTRs, normal gross motor, tone coordination and no tremor.  Psychiatric Exam: Alert and oriented - appropriate affect.       Results for orders placed or performed in visit on 11/14/18   TSH   Result Value Ref Range    Thyrotropin 1.79 0.34 - 5.60 IU/mL   Lipid Panel   Result Value Ref Range    Cholesterol 139 <200 mg/dL    Triglycerides 64 <150 mg/dL    HDL Cholesterol 61 23 - 92 mg/dL    LDL Cholesterol Calculated 65 <100 mg/dL    Non HDL Cholesterol 78 <130 mg/dL   Comprehensive Metabolic Panel   Result Value Ref Range    Sodium 140 134 - 144 mmol/L    Potassium 4.3 3.5 - 5.1 mmol/L    Chloride 105 98 - 107 mmol/L    Carbon Dioxide 28 21 - 31 mmol/L    Anion Gap 7 3 - 14 mmol/L    Glucose 114 (H) 70 - 105 mg/dL    Urea Nitrogen 14 7 - 25 mg/dL    Creatinine 0.87 0.70 - 1.30 mg/dL    GFR Estimate 86 >60 mL/min/1.7m2    GFR Estimate If Black >90 >60 mL/min/1.7m2    Calcium 9.3 8.6 - 10.3 mg/dL    Bilirubin Total 0.7 0.3 - 1.0 mg/dL    Albumin 4.0 3.5 - 5.7 g/dL    Protein Total 7.0 6.4 - 8.9 g/dL    Alkaline Phosphatase 75 34 - 104 U/L    ALT 12 7 - 52 U/L    AST 15 13 - 39 U/L        ASSESSMENT/PLAN:       ICD-10-CM    1. Annual physical exam Z00.00    2. Need for shingles vaccine Z23 zoster vaccine recombinant adjuvanted (SHINGRIX) injection   3. Hypothyroidism following radioiodine therapy E89.0 levothyroxine (SYNTHROID/LEVOTHROID) 100 MCG tablet     TSH     TSH   4. Elevated coronary artery calcium score R93.1 atorvastatin (LIPITOR) 80 MG tablet     Lipid Panel     Comprehensive Metabolic Panel     Lipid Panel     Comprehensive Metabolic Panel   5. Encounter for therapeutic drug monitoring Z51.81 TSH     Lipid Panel      "Comprehensive Metabolic Panel     TSH     Lipid Panel     Comprehensive Metabolic Panel   6. Atherosclerosis  I70.90 Lipid Panel     Lipid Panel       COUNSELING:  Reviewed preventive health counseling, as reflected in patient instructions       Regular exercise       Healthy diet/nutrition       Immunizations    Consider Shingrix - check with pharmacy       Aspirin Prophylaxsis - taking due to high calcium score       Colon cancer screening - due 2021    Reviewed orders with patient. Reviewed health maintenance and updated orders accordingly - Yes    BP Readings from Last 1 Encounters:   11/14/18 132/78     Estimated body mass index is 33.79 kg/(m^2) as calculated from the following:    Height as of this encounter: 5' 7.5\" (1.715 m).    Weight as of this encounter: 219 lb (99.3 kg).    BP Screening:   Last 3 BP Readings:    BP Readings from Last 3 Encounters:   11/14/18 132/78   07/10/18 150/83   11/13/17 129/74       The following was recommended to the patient:  Re-screen BP within a year and recommended lifestyle modifications  Weight management plan: Discussed healthy diet and exercise guidelines and patient will follow up in 12 months in clinic to re-evaluate.     reports that he has never smoked. He has never used smokeless tobacco.    Checked TSH and WNL, refilled levothyroxine.    Checked labs due to atorvastatin use. Did not inquire about fasting, glucose is elevated to prediabetes range if fasting. Lipids 60 points better. Increase atorvastatin to 80 mg daily due to elevated calcium score. He declines cardiology referral and stress testing as outlined in AHA guidelines, but has not shown to change life expectancy to stress test based off calcium score, so acceptable to continue medical management.    Fermín Mobley MD  [facility] AND [facility]      "

## 2018-11-14 NOTE — PATIENT INSTRUCTIONS
Consider vitamin D3 1000 units daily  Consider Shingrix at the pharmacy  Increase Lipitor to 80 mg daily

## 2018-11-14 NOTE — LETTER
November 14, 2018      Justino Wang  [address] [address]        Dear ,    We are writing to inform you of your test results. The thyroid test is normal. Keep on your same dose of levothyroxine. Cholesterol values have declined by 60 points on atorvastatin. Try the 80 mg dose to see if you tolerate as a way to reduce chance of future heart attack or stroke. Electrolytes, liver, and kidney tests are fine.      I did not ask if you were fasting. If you were fasting the blood glucose is in the prediabetes range between 101-125. Weight loss, exercise, and watching carbohydrate intake will help prevent progression to diabetes. We can discuss further or you can meet with a dietician if desired. Plan to recheck in a year.      Resulted Orders   TSH   Result Value Ref Range    Thyrotropin 1.79 0.34 - 5.60 IU/mL   Lipid Panel   Result Value Ref Range    Cholesterol 139 <200 mg/dL    Triglycerides 64 <150 mg/dL    HDL Cholesterol 61 23 - 92 mg/dL    LDL Cholesterol Calculated 65 <100 mg/dL      Comment:      Desirable:       <100 mg/dl    Non HDL Cholesterol 78 <130 mg/dL   Comprehensive Metabolic Panel   Result Value Ref Range    Sodium 140 134 - 144 mmol/L    Potassium 4.3 3.5 - 5.1 mmol/L    Chloride 105 98 - 107 mmol/L    Carbon Dioxide 28 21 - 31 mmol/L    Anion Gap 7 3 - 14 mmol/L    Glucose 114 (H) 70 - 105 mg/dL    Urea Nitrogen 14 7 - 25 mg/dL    Creatinine 0.87 0.70 - 1.30 mg/dL    GFR Estimate 86 >60 mL/min/1.7m2    GFR Estimate If Black >90 >60 mL/min/1.7m2    Calcium 9.3 8.6 - 10.3 mg/dL    Bilirubin Total 0.7 0.3 - 1.0 mg/dL    Albumin 4.0 3.5 - 5.7 g/dL    Protein Total 7.0 6.4 - 8.9 g/dL    Alkaline Phosphatase 75 34 - 104 U/L    ALT 12 7 - 52 U/L    AST 15 13 - 39 U/L       If you have any questions or concerns, please call the clinic at the number listed above.       Sincerely,        Fermín Mobley MD

## 2018-11-14 NOTE — MR AVS SNAPSHOT
After Visit Summary   11/14/2018    Justino Wang    MRN: [id number]           Patient Information     Date Of Birth          1945        Visit Information        Provider Department      11/14/2018 10:00 AM Fermín Mobley MD [facility]        Today's Diagnoses     Annual physical exam    -  1    Need for shingles vaccine        Hypothyroidism following radioiodine therapy        Elevated coronary artery calcium score        Encounter for therapeutic drug monitoring        Atherosclerosis           Care Instructions    Consider vitamin D3 1000 units daily  Consider Shingrix at the pharmacy  Increase Lipitor to 80 mg daily          Follow-ups after your visit        Future tests that were ordered for you today     Open Future Orders        Priority Expected Expires Ordered    TSH Routine  11/14/2019 11/14/2018    Lipid Panel Routine  11/14/2019 11/14/2018    Comprehensive Metabolic Panel Routine  11/14/2019 11/14/2018            Who to contact     If you have questions or need follow up information about today's clinic visit or your schedule please contact [facility] directly at [phone].  Normal or non-critical lab and imaging results will be communicated to you by [employer], letter or phone within 4 business days after the clinic has received the results. If you do not hear from us within 7 days, please contact the clinic through [employer] or phone. If you have a critical or abnormal lab result, we will notify you by phone as soon as possible.  Submit refill requests through [employer] or call your pharmacy and they will forward the refill request to us. Please allow 3 business days for your refill to be completed.          Additional Information About Your Visit        [employer] Information     [employer] lets you send messages to your doctor, view your test results, renew your prescriptions, schedule appointments and more. To sign up, go to  [email or web address] . Click on \"Log in\" on the left side of the screen, which will take you to the Welcome page. Then click on \"Sign up Now\" on the right side of the page.     You will be asked to enter the access code listed below, as well as some personal information. Please follow the directions to create your username and password.     Your access code is: [email or web address]  Expires: 2019 10:42 AM     Your access code will  in 90 days. If you need help or a new code, please call your [place] clinic or [phone].        Care EveryWhere ID     This is your Care EveryWhere ID. This could be used by other organizations to access your [place] medical records  [id number]        Your Vitals Were     Pulse Temperature Respirations Height BMI (Body Mass Index)       78 97  F (36.1  C) (Tympanic) 16 5' 7.5\" (1.715 m) 33.79 kg/m2        Blood Pressure from Last 3 Encounters:   18 132/78   07/10/18 150/83   17 129/74    Weight from Last 3 Encounters:   18 219 lb (99.3 kg)   07/10/18 212 lb (96.2 kg)   17 218 lb (98.9 kg)                 Today's Medication Changes          These changes are accurate as of 18 10:42 AM.  If you have any questions, ask your nurse or doctor.               Start taking these medicines.        Dose/Directions    zoster vaccine recombinant adjuvanted injection   Commonly known as:  SHINGRIX   Used for:  Need for shingles vaccine   Started by:  Fermín Mobley MD        Dose:  1 each   Inject 0.5 mLs into the muscle once for 1 dose   Quantity:  0.5 mL   Refills:  0         These medicines have changed or have updated prescriptions.        Dose/Directions    atorvastatin 80 MG tablet   Commonly known as:  LIPITOR   This may have changed:    - medication strength  - how much to take  - additional instructions   Used for:  Elevated coronary artery calcium score   Changed by:  Fermín Mobley MD        Dose:  80 mg   Take 1 tablet (80 mg) by mouth " daily (with dinner)   Quantity:  90 tablet   Refills:  4            Where to get your medicines      These medications were sent to [facility] [address] [facility] - [place], [place] - [address]  [address] [place] [facility] [address]     Phone:  [phone]     atorvastatin 80 MG tablet    levothyroxine 100 MCG tablet    zoster vaccine recombinant adjuvanted injection                Primary Care Provider Office Phone # Fax #    Fermín Mobley [phone]       [address]  [facility] [address]        Equal Access to Services     [facility]: Hadii aad ku hadasho Soomaali, waaxda luqadaha, qaybta kaalmada adeegyada, andreia lopez . So [facility] [phone].    ATENCIÓN: Si habla español, tiene a augustin disposición servicios gratuitos de asistencia lingüística. Llame al [phone].    We comply with applicable federal civil rights laws and [place] laws. We do not discriminate on the basis of race, color, national origin, age, disability, sex, sexual orientation, or gender identity.            Thank you!     Thank you for choosing [facility] AND [facility] [place]  for your care. Our goal is always to provide you with excellent care. Hearing back from our patients is one way we can continue to improve our services. Please take a few minutes to complete the written survey that you may receive in the mail after your visit with us. Thank you!             Your Updated Medication List - Protect others around you: Learn how to safely use, store and throw away your medicines at www.disposemymeds.org.          This list is accurate as of 11/14/18 10:42 AM.  Always use your most recent med list.                   Brand Name Dispense Instructions for use Diagnosis    aspirin 81 MG EC tablet      Take 81 mg by mouth daily with food Take 1 tablet by mouth once daily with a meal.        atorvastatin 80 MG tablet    LIPITOR    90 tablet    Take 1 tablet (80 mg) by mouth  daily (with dinner)    Elevated coronary artery calcium score       levothyroxine 100 MCG tablet    SYNTHROID/LEVOTHROID    90 tablet    Take 1 tablet (100 mcg) by mouth every morning (before breakfast) Take 1 tablet by mouth before breakfast.    Hypothyroidism following radioiodine therapy       zoster vaccine recombinant adjuvanted injection    SHINGRIX    0.5 mL    Inject 0.5 mLs into the muscle once for 1 dose    Need for shingles vaccine

## 2018-11-29 RX ORDER — ATORVASTATIN CALCIUM 40 MG/1
TABLET, FILM COATED ORAL
Qty: 90 TABLET | Refills: 3 | OUTPATIENT
Start: 2018-11-29

## 2018-11-29 NOTE — TELEPHONE ENCOUNTER
Refill request from [facility] for:  atorvastatin (LIPITOR) 40 MG tablet     Filled 11/14/2018 for 12 month RX-receipt confirmed by pharmacy    Prescription refused.  This is a duplicate request.  Renetta Agosto.......11/29/2018 4:41 PM

## 2018-12-06 DIAGNOSIS — R93.1 ELEVATED CORONARY ARTERY CALCIUM SCORE: ICD-10-CM

## 2018-12-06 RX ORDER — ATORVASTATIN CALCIUM 40 MG/1
40 TABLET, FILM COATED ORAL
Qty: 90 TABLET | Refills: 3 | OUTPATIENT
Start: 2018-12-06

## 2018-12-06 NOTE — TELEPHONE ENCOUNTER
Writer received a faxed Rx request from [facility] in [facility] for Atorvastatin 40 mg-take one tablet by mouth once daily with evening meal. They are requesting refills. Chart review shows that patient with active Rx on file as noted below, as written on 11/14/18:    Medication Detail         Disp Refills Start End JUSTICE     atorvastatin (LIPITOR) 80 MG tablet 90 tablet 4 11/14/2018  No     Sig - Route: Take 1 tablet (80 mg) by mouth daily (with dinner) - Oral     Class: E-Prescribe     Order: [id number]     E-Prescribing Status: Receipt confirmed by pharmacy (11/14/2018 10:40 AM CST)       Printout Tracking      External Result Report       Pharmacy      [facility] [id number] [facility] - [place], [place] - [address]     Chart review shows that patient was seen by PCP on that date for an annual physical with plan as noted below:    Increase atorvastatin to 80 mg daily due to elevated calcium score.    Patient should be taking Rx as noted above and not Rx as requested from the pharmacy. Writer will refuse Rx request in this encounter and make note of Rx as noted above as well as dose increase in Rx refusal to pharmacy.    Unable to complete prescription refill per RN Medication Refill Policy. Hollis Mata 12/6/2018 2:27 PM

## 2019-10-24 DIAGNOSIS — E89.0 HYPOTHYROIDISM FOLLOWING RADIOIODINE THERAPY: ICD-10-CM

## 2019-10-28 RX ORDER — LEVOTHYROXINE SODIUM 100 UG/1
TABLET ORAL
Qty: 90 TABLET | Refills: 3 | OUTPATIENT
Start: 2019-10-28

## 2019-10-28 NOTE — TELEPHONE ENCOUNTER
[facility] is requesting a refill on the following medication:    LEVOTHYROXINE 100 MCG TABLET    Will file in chart as: levothyroxine (SYNTHROID/LEVOTHROID) 100 MCG tablet   Sig: TAKE 1 TABLET (100 MCG) BY MOUTH EVERY MORNING (BEFORE BREAKFAST)   Disp:  90 tablet    Refills:  3   Start: 10/24/2019   For: Hypothyroidism following radioiodine therapy   Last ordered: 11 months ago by Fermín Mobley MD Last refill: 7/27/2019   Thyroid Protocol Passed     Last Written Prescription Date:  11/14/18  Last Fill Quantity: 90,  # refills: 3   Last office visit: 11/14/2018 with prescribing provider:  Itz      Future Office Visit:   Next 5 appointments (look out 90 days)    Nov 26, 2019  9:30 AM CST  PHYSICAL with Fermín Mobley MD  [facility] [address]  [place] [address] [phone]         Call placed to patient to see if refill is needed, patient reports that he has enough to get to next appointment in November. Will refuse refill at this time.   Ginette Shi RN on 10/28/2019 at 9:28 AM

## 2019-11-26 ENCOUNTER — OFFICE VISIT (OUTPATIENT)
Dept: FAMILY MEDICINE | Facility: OTHER | Age: 74
End: 2019-11-26
Attending: FAMILY MEDICINE
Payer: MEDICARE

## 2019-11-26 VITALS
HEART RATE: 62 BPM | SYSTOLIC BLOOD PRESSURE: 122 MMHG | WEIGHT: 217.4 LBS | TEMPERATURE: 96.4 F | OXYGEN SATURATION: 96 % | DIASTOLIC BLOOD PRESSURE: 72 MMHG | HEIGHT: 68 IN | RESPIRATION RATE: 18 BRPM | BODY MASS INDEX: 32.95 KG/M2

## 2019-11-26 DIAGNOSIS — I70.90 ATHEROSCLEROSIS: ICD-10-CM

## 2019-11-26 DIAGNOSIS — Z00.00 ENCOUNTER FOR MEDICARE ANNUAL WELLNESS EXAM: Primary | ICD-10-CM

## 2019-11-26 DIAGNOSIS — R73.01 ELEVATED FASTING GLUCOSE: ICD-10-CM

## 2019-11-26 DIAGNOSIS — R93.1 ELEVATED CORONARY ARTERY CALCIUM SCORE: ICD-10-CM

## 2019-11-26 DIAGNOSIS — Z51.81 ENCOUNTER FOR THERAPEUTIC DRUG MONITORING: ICD-10-CM

## 2019-11-26 DIAGNOSIS — E89.0 HYPOTHYROIDISM FOLLOWING RADIOIODINE THERAPY: ICD-10-CM

## 2019-11-26 LAB
ALBUMIN SERPL-MCNC: 4.1 G/DL (ref 3.5–5.7)
ALP SERPL-CCNC: 86 U/L (ref 34–104)
ALT SERPL W P-5'-P-CCNC: 15 U/L (ref 7–52)
ANION GAP SERPL CALCULATED.3IONS-SCNC: 4 MMOL/L (ref 3–14)
AST SERPL W P-5'-P-CCNC: 18 U/L (ref 13–39)
BILIRUB SERPL-MCNC: 0.8 MG/DL (ref 0.3–1)
BUN SERPL-MCNC: 16 MG/DL (ref 7–25)
CALCIUM SERPL-MCNC: 9.2 MG/DL (ref 8.6–10.3)
CHLORIDE SERPL-SCNC: 105 MMOL/L (ref 98–107)
CHOLEST SERPL-MCNC: 142 MG/DL
CO2 SERPL-SCNC: 28 MMOL/L (ref 21–31)
CREAT SERPL-MCNC: 0.97 MG/DL (ref 0.7–1.3)
GFR SERPL CREATININE-BSD FRML MDRD: 75 ML/MIN/{1.73_M2}
GLUCOSE SERPL-MCNC: 112 MG/DL (ref 70–105)
HBA1C MFR BLD: 5.6 % (ref 4–6)
HDLC SERPL-MCNC: 61 MG/DL (ref 23–92)
LDLC SERPL CALC-MCNC: 70 MG/DL
NONHDLC SERPL-MCNC: 81 MG/DL
POTASSIUM SERPL-SCNC: 4.4 MMOL/L (ref 3.5–5.1)
PROT SERPL-MCNC: 7.2 G/DL (ref 6.4–8.9)
SODIUM SERPL-SCNC: 137 MMOL/L (ref 134–144)
TRIGL SERPL-MCNC: 53 MG/DL
TSH SERPL DL<=0.05 MIU/L-ACNC: 2.45 IU/ML (ref 0.34–5.6)

## 2019-11-26 PROCEDURE — G0463 HOSPITAL OUTPT CLINIC VISIT: HCPCS

## 2019-11-26 PROCEDURE — 80053 COMPREHEN METABOLIC PANEL: CPT | Mod: ZL | Performed by: FAMILY MEDICINE

## 2019-11-26 PROCEDURE — 84443 ASSAY THYROID STIM HORMONE: CPT | Mod: ZL | Performed by: FAMILY MEDICINE

## 2019-11-26 PROCEDURE — 83036 HEMOGLOBIN GLYCOSYLATED A1C: CPT | Mod: ZL | Performed by: FAMILY MEDICINE

## 2019-11-26 PROCEDURE — G0438 PPPS, INITIAL VISIT: HCPCS | Performed by: FAMILY MEDICINE

## 2019-11-26 PROCEDURE — 80061 LIPID PANEL: CPT | Mod: ZL | Performed by: FAMILY MEDICINE

## 2019-11-26 PROCEDURE — 36415 COLL VENOUS BLD VENIPUNCTURE: CPT | Mod: ZL | Performed by: FAMILY MEDICINE

## 2019-11-26 ASSESSMENT — PAIN SCALES - GENERAL: PAINLEVEL: NO PAIN (0)

## 2019-11-26 ASSESSMENT — MIFFLIN-ST. JEOR: SCORE: 1682.68

## 2019-11-26 NOTE — NURSING NOTE
Pt presents to clinic today for annual physical.      Medication Reconciliation: complete  Shantel Redman LPN

## 2019-11-26 NOTE — LETTER
November 26, 2019      Justino Wang  [address] [address]        Dear ,    We are writing to inform you of your test results. Cholesterol values are actually pretty similar to last year. The dose of cholesterol medication is usually maximized if we are concerned about vascular risk, so despite the lack of change, continue with atorvastatin 80 mg daily.    The thyroid test is in the normal range. Electrolytes, liver, and kidney tests are fine.      Your blood glucose is elevated in the prediabetes range. The A1c measures an average sugar over 3 months and is high normal. I would consider you to just be in the prediabetes range. Typically insulin resistance increases with age, so the sugars will climb over time. Certainly watching your sugar and carbohydrate intake would be best at this time. If you can make some small positive changes that may be sufficient at this time. If the levels increase next year, then we can discuss other more significant changes. I ordered labs for prior to next year's physical.    Resulted Orders   Lipid Panel   Result Value Ref Range    Cholesterol 142 <200 mg/dL    Triglycerides 53 <150 mg/dL    HDL Cholesterol 61 23 - 92 mg/dL    LDL Cholesterol Calculated 70 <100 mg/dL      Comment:      Desirable:       <100 mg/dl    Non HDL Cholesterol 81 <130 mg/dL   TSH Reflex GH   Result Value Ref Range    TSH Reflex 2.45 0.34 - 5.60 IU/mL   Comprehensive Metabolic Panel   Result Value Ref Range    Sodium 137 134 - 144 mmol/L    Potassium 4.4 3.5 - 5.1 mmol/L    Chloride 105 98 - 107 mmol/L    Carbon Dioxide 28 21 - 31 mmol/L    Anion Gap 4 3 - 14 mmol/L    Glucose 112 (H) 70 - 105 mg/dL    Urea Nitrogen 16 7 - 25 mg/dL    Creatinine 0.97 0.70 - 1.30 mg/dL    GFR Estimate 75 >60 mL/min/[1.73_m2]    GFR Estimate If Black >90 >60 mL/min/[1.73_m2]    Calcium 9.2 8.6 - 10.3 mg/dL    Bilirubin Total 0.8 0.3 - 1.0 mg/dL    Albumin 4.1 3.5 - 5.7 g/dL    Protein Total  7.2 6.4 - 8.9 g/dL    Alkaline Phosphatase 86 34 - 104 U/L    ALT 15 7 - 52 U/L    AST 18 13 - 39 U/L   Hemoglobin A1c   Result Value Ref Range    Hemoglobin A1C 5.6 4.0 - 6.0 %       If you have any questions or concerns, please call the clinic at the number listed above.       Sincerely,        Fermín Mobley MD

## 2019-11-26 NOTE — PATIENT INSTRUCTIONS
Get shingles (Shingrix) vaccine at a pharmacy  Labs via letter      Patient Education   Personalized Prevention Plan  You are due for the preventive services outlined below.  Your care team is available to assist you in scheduling these services.  If you have already completed any of these items, please share that information with your care team to update in your medical record.  Health Maintenance Due   Topic Date Due     Zoster (Shingles) Vaccine (2 of 3) 12/19/2012     PHQ-2  01/01/2019     Annual Wellness Visit  11/14/2019         Selected Food Sources of Magnesium       Food Milligrams  (mg) per  serving Percent  DV*   Almonds, dry roasted, 1 ounce 80 20   Spinach, boiled,   cup 78 20   Cashews, dry roasted, 1 ounce 74 19   Peanuts, oil roasted,   cup 63 16   Cereal, shredded wheat, 2 large biscuits 61 15   Soymilk, plain or vanilla, 1 cup 61 15   Black beans, cooked,   cup 60 15   Edamame, shelled, cooked,   cup 50 13   Peanut butter, smooth, 2 tablespoons 49 12   Bread, whole wheat, 2 slices 46 12   Avocado, cubed, 1 cup 44 11   Potato, baked with skin, 3.5 ounces 43 11   Rice, brown, cooked,   cup 42 11   Yogurt, plain, low fat, 8 ounces 42 11   Oatmeal, instant, 1 packet 36 9   Kidney beans, canned,   cup 35 9   Banana, 1 medium 32 8   [place], [place], farmed, cooked, 3 ounces 26 7   Milk, 1 cup 24-27 6-7   Halibut, cooked, 3 ounces 24 6   Raisins,   cup 23 6

## 2019-11-26 NOTE — PROGRESS NOTES
"SUBJECTIVE:   Justino Wang is a 74 year old male who presents for Preventive Visit.    Are you in the first 12 months of your Medicare Part B coverage?  No    Physical Health:    In general, how would you rate your overall physical health? good    Outside of work, how many days during the week do you exercise? 6-7 days/week    Outside of work, approximately how many minutes a day do you exercise?greater than 60 minutes    If you drink alcohol do you typically have >3 drinks per day or >7 drinks per week? No    Do you usually eat at least 4 servings of fruit and vegetables a day, include whole grains & fiber and avoid regularly eating high fat or \"junk\" foods? NO    Do you have any problems taking medications regularly?  No    Do you have any side effects from medications? none    Needs assistance for the following daily activities: no assistance needed    Which of the following safety concerns are present in your home?  none identified     Hearing impairment: No    In the past 6 months, have you been bothered by leaking of urine? no    Mental Health:    In general, how would you rate your overall mental or emotional health? good  PHQ-2 Score: 0    Do you feel safe in your environment? Yes    Have you ever done Advance Care Planning? (For example, a Health Directive, POLST, or a discussion with a medical provider or your loved ones about your wishes): Yes, advance care planning is on file.    Additional concerns to address?  YES, pt has been having some pain in his right calf    Fall risk:  Fallen 2 or more times in the past year?: No  Any fall with injury in the past year?: No    Cognitive Screenin) Repeat 3 items (Leader, Season, Table)    2) Clock draw: NORMAL  3) 3 item recall: Recalls 3 objects  Results: 3 items recalled: COGNITIVE IMPAIRMENT LESS LIKELY    Mini-CogTM Copyright JONNATHAN Soliman. Licensed by the author for use in [facility]; reprinted with permission (thomas@.edu). All rights " reserved.      Do you have sleep apnea, excessive snoring or daytime drowsiness?: no    PROBLEMS TO ADD ON...  Calcium score over 3000 in 2017. Started on aspirin and statin. Increased atorvastatin from 40 to 80 mg daily in 2018. Tolerating increase.    Hx of thyroid ablation and on a stable dose of levothyroxine.    Reviewed and updated as needed this visit by clinical staff  Tobacco  Allergies  Meds  Med Hx  Surg Hx  Fam Hx  Soc Hx        Reviewed and updated as needed this visit by Provider  Tobacco  Allergies  Meds  Problems  Med Hx  Surg Hx  Fam Hx        Social History     Tobacco Use     Smoking status: Never Smoker     Smokeless tobacco: Never Used   Substance Use Topics     Alcohol use: Yes     Alcohol/week: 12.5 standard drinks     Comment: Daily 2 drinks                           Current providers sharing in care for this patient include:   Patient Care Team:  Fermín Mobley MD as PCP - General (Family Practice)  Fermín Mobley MD as Assigned PCP    The following health maintenance items are reviewed in Epic and correct as of today:  Health Maintenance   Topic Date Due     ZOSTER IMMUNIZATION (2 of 3) 12/19/2012     PHQ-2  01/01/2019     INFLUENZA VACCINE (1) 09/01/2019     FALL RISK ASSESSMENT  11/14/2019     MEDICARE ANNUAL WELLNESS VISIT  11/14/2019     COLONOSCOPY  01/08/2021     DTAP/TDAP/TD IMMUNIZATION (4 - Td) 10/24/2022     ADVANCE CARE PLANNING  02/14/2023     LIPID  11/14/2023     PNEUMOCOCCAL IMMUNIZATION 65+ LOW/MEDIUM RISK  Completed     IPV IMMUNIZATION  Aged Out     MENINGITIS IMMUNIZATION  Aged Out     Patient Active Problem List   Diagnosis     Actinic keratosis of scalp     Elevated coronary artery calcium score     Graves' disease     Hypothyroidism following radioiodine therapy     Seborrheic keratoses     Tubular adenoma of colon     ACP (advance care planning)     Past Surgical History:   Procedure Laterality Date     C THYROID ABLATION       CLOSED RX ELBOW  "DISLOCATION  2018    Dr Reich     COLONOSCOPY  2016    Dr Rosa     VASECTOMY      No Comments Provided       Social History     Tobacco Use     Smoking status: Never Smoker     Smokeless tobacco: Never Used   Substance Use Topics     Alcohol use: Yes     Alcohol/week: 12.5 standard drinks     Comment: Daily 2 drinks     Family History   Problem Relation Age of Onset     Other - See Comments Mother         natural  92     Diabetes Mother         Diabetes     Other - See Comments Father         pancreatic cancer  68     Heart Disease Father 64        Heart Disease,MI age 64         Current Outpatient Medications   Medication Sig Dispense Refill     aspirin EC 81 MG EC tablet Take 81 mg by mouth daily with food Take 1 tablet by mouth once daily with a meal.       atorvastatin (LIPITOR) 80 MG tablet Take 1 tablet (80 mg) by mouth daily (with dinner) 90 tablet 4     levothyroxine (SYNTHROID/LEVOTHROID) 100 MCG tablet Take 1 tablet (100 mcg) by mouth every morning (before breakfast) Take 1 tablet by mouth before breakfast. 90 tablet 3     No Known Allergies    ROS:  General: Denies general constitutional problems  Eyes: Denies problems  Ears/Nose/Throat: Denies problems  Cardiovascular: Denies problems  Respiratory: Denies problems  Gastrointestinal: Denies problems  Genitourinary: Denies problems  Musculoskeletal: Denies problems  Skin: Denies problems  Musculoskeletal: Right shin pain intermittently for years, happens with more exertion, but not consistently  Neurologic: Sees sparkles in the left visual field at times. Lasts about 15 minutes. No vision loss. No headaches. Present for months, not worsening.  Psychiatric: Denies problems    OBJECTIVE:   /72   Pulse 62   Temp 96.4  F (35.8  C) (Temporal)   Resp 18   Ht 1.715 m (5' 7.5\")   Wt 98.6 kg (217 lb 6.4 oz)   SpO2 96%   BMI 33.55 kg/m   Estimated body mass index is 33.55 kg/m  as calculated from the following:    Height as of this " "encounter: 1.715 m (5' 7.5\").    Weight as of this encounter: 98.6 kg (217 lb 6.4 oz).  EXAM:   General Appearance: Pleasant, alert, appropriate appearance for age. No acute distress  Eye Exam:  Normal external eye, conjunctiva, lids, cornea. ALEJANDRA.  Ear Exam: Normal TM's bilaterally. Normal auditory canals and external ears.  OroPharynx Exam:  Dental hygiene adequate. Normal buccal mucosa. Normal pharynx.  Neck Exam:  Supple, no masses or nodes. No carotid bruits.  Thyroid Exam: No nodules or enlargement.  Chest/Respiratory Exam: Normal chest wall and respirations. Clear to auscultation.  Cardiovascular Exam: Regular rate and rhythm. S1, S2, no murmur, click, gallop, or rubs.  Gastrointestinal Exam: Soft, non-tender, no masses or organomegaly.  Musculoskeletal Exam: Back is straight and non-tender, full ROM of upper and lower extremities.  Foot Exam: Left and right foot: good pedal pulses.  Skin: no rash or abnormalities  Neurologic Exam: Nonfocal, symmetric DTRs, normal gross motor, tone coordination and no tremor.  Psychiatric Exam: Alert and oriented - appropriate affect.      Results for orders placed or performed in visit on 11/26/19   Lipid Panel     Status: None   Result Value Ref Range    Cholesterol 142 <200 mg/dL    Triglycerides 53 <150 mg/dL    HDL Cholesterol 61 23 - 92 mg/dL    LDL Cholesterol Calculated 70 <100 mg/dL    Non HDL Cholesterol 81 <130 mg/dL   TSH Reflex GH     Status: None   Result Value Ref Range    TSH Reflex 2.45 0.34 - 5.60 IU/mL   Comprehensive Metabolic Panel     Status: Abnormal   Result Value Ref Range    Sodium 137 134 - 144 mmol/L    Potassium 4.4 3.5 - 5.1 mmol/L    Chloride 105 98 - 107 mmol/L    Carbon Dioxide 28 21 - 31 mmol/L    Anion Gap 4 3 - 14 mmol/L    Glucose 112 (H) 70 - 105 mg/dL    Urea Nitrogen 16 7 - 25 mg/dL    Creatinine 0.97 0.70 - 1.30 mg/dL    GFR Estimate 75 >60 mL/min/[1.73_m2]    GFR Estimate If Black >90 >60 mL/min/[1.73_m2]    Calcium 9.2 8.6 - 10.3 " "mg/dL    Bilirubin Total 0.8 0.3 - 1.0 mg/dL    Albumin 4.1 3.5 - 5.7 g/dL    Protein Total 7.2 6.4 - 8.9 g/dL    Alkaline Phosphatase 86 34 - 104 U/L    ALT 15 7 - 52 U/L    AST 18 13 - 39 U/L   Hemoglobin A1c     Status: None   Result Value Ref Range    Hemoglobin A1C 5.6 4.0 - 6.0 %        ASSESSMENT / PLAN:       ICD-10-CM    1. Encounter for Medicare annual wellness exam Z00.00    2. Hypothyroidism following radioiodine therapy E89.0 TSH Reflex GH     TSH Reflex GH     TSH Reflex GH     levothyroxine (SYNTHROID/LEVOTHROID) 100 MCG tablet   3. Elevated coronary artery calcium score R93.1 Lipid Panel     Lipid Panel     Comprehensive Metabolic Panel     Lipid Panel     atorvastatin (LIPITOR) 80 MG tablet   4. Encounter for therapeutic drug monitoring Z51.81 Comprehensive Metabolic Panel     TSH Reflex GH     Lipid Panel     Lipid Panel     TSH Reflex GH     Comprehensive Metabolic Panel     Comprehensive Metabolic Panel     TSH Reflex GH   5. Elevated fasting glucose R73.01 Hemoglobin A1c     Hemoglobin A1c     Comprehensive Metabolic Panel   6. Atherosclerosis  I70.90 Lipid Panel     Lipid Panel     CBC W PLT No Diff     Lipid Panel     Past glucose elevated. Fasting glucose today in prediabetes range, A1c top normal. Recommend diet modifications to avoid progression to diabetes. If he worsens, then see dietician.    Refilled chronic medications      COUNSELING:  Reviewed preventive health counseling, as reflected in patient instructions       Regular exercise       Healthy diet/nutrition       Immunizations    Shingrix at pharmacy         Aspirin Prophylaxsis - taking due to high calcium score       Colon cancer screening - due 2021      Estimated body mass index is 33.55 kg/m  as calculated from the following:    Height as of this encounter: 1.715 m (5' 7.5\").    Weight as of this encounter: 98.6 kg (217 lb 6.4 oz).    Weight management plan: Discussed healthy diet and exercise guidelines     reports that he " has never smoked. He has never used smokeless tobacco.      Appropriate preventive services were discussed with this patient, including applicable screening as appropriate for cardiovascular disease, diabetes, osteopenia/osteoporosis, and glaucoma.  As appropriate for age/gender, discussed screening for colorectal cancer, prostate cancer, breast cancer, and cervical cancer. Checklist reviewing preventive services available has been given to the patient.    Reviewed patients plan of care and provided an AVS. The Basic Care Plan (routine screening as documented in Health Maintenance) for Justino meets the Care Plan requirement. This Care Plan has been established and reviewed with the Patient.    Counseling Resources:  ATP IV Guidelines  Pooled Cohorts Equation Calculator  Breast Cancer Risk Calculator  FRAX Risk Assessment  ICSI Preventive Guidelines  Dietary Guidelines for Americans, 2010  USDA's MyPlate  ASA Prophylaxis  Lung CA Screening    Fermín Mobley MD  [facility]

## 2019-11-29 RX ORDER — ATORVASTATIN CALCIUM 80 MG/1
80 TABLET, FILM COATED ORAL
Qty: 90 TABLET | Refills: 4 | Status: SHIPPED | OUTPATIENT
Start: 2019-11-29 | End: 2020-11-30

## 2019-11-29 RX ORDER — LEVOTHYROXINE SODIUM 100 UG/1
100 TABLET ORAL
Qty: 90 TABLET | Refills: 4 | Status: SHIPPED | OUTPATIENT
Start: 2019-11-29 | End: 2020-11-30

## 2019-12-13 ENCOUNTER — TELEPHONE (OUTPATIENT)
Dept: FAMILY MEDICINE | Facility: OTHER | Age: 74
End: 2019-12-13

## 2019-12-13 NOTE — TELEPHONE ENCOUNTER
Called patient and he states wife and him had some medical questions in reguards to their daughter. Phone note opened up in daughter chart and routed to Dr. Hernandez.  Silvano Mcclure LPN,..............12/13/2019 3:03 PM

## 2020-10-22 ENCOUNTER — ALLIED HEALTH/NURSE VISIT (OUTPATIENT)
Dept: FAMILY MEDICINE | Facility: OTHER | Age: 75
End: 2020-10-22
Payer: MEDICARE

## 2020-10-22 DIAGNOSIS — Z23 NEED FOR PROPHYLACTIC VACCINATION AND INOCULATION AGAINST INFLUENZA: Primary | ICD-10-CM

## 2020-10-22 PROCEDURE — G0008 ADMIN INFLUENZA VIRUS VAC: HCPCS

## 2020-11-30 ENCOUNTER — OFFICE VISIT (OUTPATIENT)
Dept: FAMILY MEDICINE | Facility: OTHER | Age: 75
End: 2020-11-30
Attending: FAMILY MEDICINE
Payer: MEDICARE

## 2020-11-30 VITALS
BODY MASS INDEX: 33.8 KG/M2 | HEART RATE: 64 BPM | TEMPERATURE: 96.9 F | SYSTOLIC BLOOD PRESSURE: 120 MMHG | HEIGHT: 68 IN | OXYGEN SATURATION: 96 % | WEIGHT: 223 LBS | RESPIRATION RATE: 20 BRPM | DIASTOLIC BLOOD PRESSURE: 78 MMHG

## 2020-11-30 DIAGNOSIS — R00.2 PALPITATIONS: ICD-10-CM

## 2020-11-30 DIAGNOSIS — R73.01 ELEVATED FASTING GLUCOSE: ICD-10-CM

## 2020-11-30 DIAGNOSIS — I70.90 ATHEROSCLEROSIS: ICD-10-CM

## 2020-11-30 DIAGNOSIS — Z00.00 ENCOUNTER FOR ANNUAL WELLNESS EXAM IN MEDICARE PATIENT: Primary | ICD-10-CM

## 2020-11-30 DIAGNOSIS — R93.1 ELEVATED CORONARY ARTERY CALCIUM SCORE: ICD-10-CM

## 2020-11-30 DIAGNOSIS — H91.93 BILATERAL HEARING LOSS, UNSPECIFIED HEARING LOSS TYPE: ICD-10-CM

## 2020-11-30 DIAGNOSIS — E89.0 HYPOTHYROIDISM FOLLOWING RADIOIODINE THERAPY: ICD-10-CM

## 2020-11-30 DIAGNOSIS — Z51.81 ENCOUNTER FOR THERAPEUTIC DRUG MONITORING: ICD-10-CM

## 2020-11-30 DIAGNOSIS — I44.0 1ST DEGREE AV BLOCK: ICD-10-CM

## 2020-11-30 LAB
ABO + RH BLD: NORMAL
ABO + RH BLD: NORMAL
ALBUMIN SERPL-MCNC: 4 G/DL (ref 3.5–5.7)
ALP SERPL-CCNC: 71 U/L (ref 34–104)
ALT SERPL W P-5'-P-CCNC: 17 U/L (ref 7–52)
ANION GAP SERPL CALCULATED.3IONS-SCNC: 7 MMOL/L (ref 3–14)
AST SERPL W P-5'-P-CCNC: 18 U/L (ref 13–39)
BILIRUB SERPL-MCNC: 0.7 MG/DL (ref 0.3–1)
BUN SERPL-MCNC: 15 MG/DL (ref 7–25)
CALCIUM SERPL-MCNC: 9.1 MG/DL (ref 8.6–10.3)
CHLORIDE SERPL-SCNC: 106 MMOL/L (ref 98–107)
CHOLEST SERPL-MCNC: 123 MG/DL
CO2 SERPL-SCNC: 27 MMOL/L (ref 21–31)
CREAT SERPL-MCNC: 0.94 MG/DL (ref 0.7–1.3)
ERYTHROCYTE [DISTWIDTH] IN BLOOD BY AUTOMATED COUNT: 12.1 % (ref 10–15)
GFR SERPL CREATININE-BSD FRML MDRD: 78 ML/MIN/{1.73_M2}
GLUCOSE SERPL-MCNC: 112 MG/DL (ref 70–105)
HBA1C MFR BLD: 5.4 % (ref 4–6)
HCT VFR BLD AUTO: 43.3 % (ref 40–53)
HDLC SERPL-MCNC: 51 MG/DL (ref 23–92)
HGB BLD-MCNC: 14.4 G/DL (ref 13.3–17.7)
LDLC SERPL CALC-MCNC: 60 MG/DL
MCH RBC QN AUTO: 32.4 PG (ref 26.5–33)
MCHC RBC AUTO-ENTMCNC: 33.3 G/DL (ref 31.5–36.5)
MCV RBC AUTO: 98 FL (ref 78–100)
NONHDLC SERPL-MCNC: 72 MG/DL
PLATELET # BLD AUTO: 269 10E9/L (ref 150–450)
POTASSIUM SERPL-SCNC: 4.6 MMOL/L (ref 3.5–5.1)
PROT SERPL-MCNC: 7.1 G/DL (ref 6.4–8.9)
RBC # BLD AUTO: 4.44 10E12/L (ref 4.4–5.9)
SODIUM SERPL-SCNC: 140 MMOL/L (ref 134–144)
SPECIMEN EXP DATE BLD: NORMAL
TRIGL SERPL-MCNC: 62 MG/DL
TSH SERPL DL<=0.05 MIU/L-ACNC: 0.93 IU/ML (ref 0.34–5.6)
WBC # BLD AUTO: 5.1 10E9/L (ref 4–11)

## 2020-11-30 PROCEDURE — 86901 BLOOD TYPING SEROLOGIC RH(D): CPT | Mod: ZL | Performed by: FAMILY MEDICINE

## 2020-11-30 PROCEDURE — G0439 PPPS, SUBSEQ VISIT: HCPCS | Performed by: FAMILY MEDICINE

## 2020-11-30 PROCEDURE — 86900 BLOOD TYPING SEROLOGIC ABO: CPT | Mod: ZL | Performed by: FAMILY MEDICINE

## 2020-11-30 PROCEDURE — 93005 ELECTROCARDIOGRAM TRACING: CPT

## 2020-11-30 PROCEDURE — 93010 ELECTROCARDIOGRAM REPORT: CPT | Performed by: INTERNAL MEDICINE

## 2020-11-30 PROCEDURE — 80053 COMPREHEN METABOLIC PANEL: CPT | Mod: ZL | Performed by: FAMILY MEDICINE

## 2020-11-30 PROCEDURE — 99212 OFFICE O/P EST SF 10 MIN: CPT | Mod: 25 | Performed by: FAMILY MEDICINE

## 2020-11-30 PROCEDURE — 80061 LIPID PANEL: CPT | Mod: ZL | Performed by: FAMILY MEDICINE

## 2020-11-30 PROCEDURE — 36415 COLL VENOUS BLD VENIPUNCTURE: CPT | Mod: ZL | Performed by: FAMILY MEDICINE

## 2020-11-30 PROCEDURE — 85027 COMPLETE CBC AUTOMATED: CPT | Mod: ZL | Performed by: FAMILY MEDICINE

## 2020-11-30 PROCEDURE — 84443 ASSAY THYROID STIM HORMONE: CPT | Mod: ZL | Performed by: FAMILY MEDICINE

## 2020-11-30 PROCEDURE — G0463 HOSPITAL OUTPT CLINIC VISIT: HCPCS | Mod: 25

## 2020-11-30 PROCEDURE — 83036 HEMOGLOBIN GLYCOSYLATED A1C: CPT | Mod: ZL | Performed by: FAMILY MEDICINE

## 2020-11-30 PROCEDURE — G0463 HOSPITAL OUTPT CLINIC VISIT: HCPCS

## 2020-11-30 RX ORDER — ATORVASTATIN CALCIUM 80 MG/1
80 TABLET, FILM COATED ORAL
Qty: 90 TABLET | Refills: 4 | Status: SHIPPED | OUTPATIENT
Start: 2020-11-30 | End: 2022-01-03

## 2020-11-30 RX ORDER — LEVOTHYROXINE SODIUM 100 UG/1
100 TABLET ORAL
Qty: 90 TABLET | Refills: 4 | Status: SHIPPED | OUTPATIENT
Start: 2020-11-30 | End: 2022-01-03

## 2020-11-30 ASSESSMENT — ANXIETY QUESTIONNAIRES
IF YOU CHECKED OFF ANY PROBLEMS ON THIS QUESTIONNAIRE, HOW DIFFICULT HAVE THESE PROBLEMS MADE IT FOR YOU TO DO YOUR WORK, TAKE CARE OF THINGS AT HOME, OR GET ALONG WITH OTHER PEOPLE: NOT DIFFICULT AT ALL
3. WORRYING TOO MUCH ABOUT DIFFERENT THINGS: NOT AT ALL
5. BEING SO RESTLESS THAT IT IS HARD TO SIT STILL: NOT AT ALL
2. NOT BEING ABLE TO STOP OR CONTROL WORRYING: NOT AT ALL
GAD7 TOTAL SCORE: 0
1. FEELING NERVOUS, ANXIOUS, OR ON EDGE: NOT AT ALL
6. BECOMING EASILY ANNOYED OR IRRITABLE: NOT AT ALL
7. FEELING AFRAID AS IF SOMETHING AWFUL MIGHT HAPPEN: NOT AT ALL

## 2020-11-30 ASSESSMENT — PATIENT HEALTH QUESTIONNAIRE - PHQ9
SUM OF ALL RESPONSES TO PHQ QUESTIONS 1-9: 0
5. POOR APPETITE OR OVEREATING: NOT AT ALL

## 2020-11-30 ASSESSMENT — MIFFLIN-ST. JEOR: SCORE: 1707.05

## 2020-11-30 ASSESSMENT — PAIN SCALES - GENERAL: PAINLEVEL: MODERATE PAIN (4)

## 2020-11-30 NOTE — PROGRESS NOTES
"SUBJECTIVE:   Justino Wang is a 75 year old male who presents for Preventive Visit.      Patient has been advised of split billing requirements and indicates understanding: Yes  Are you in the first 12 months of your Medicare Part B coverage?  No    Physical Health:    In general, how would you rate your overall physical health? good    Outside of work, how many days during the week do you exercise? 4-5 days/week    Outside of work, approximately how many minutes a day do you exercise?greater than 60 minutes    If you drink alcohol do you typically have >3 drinks per day or >7 drinks per week? No    Do you usually eat at least 4 servings of fruit and vegetables a day, include whole grains & fiber and avoid regularly eating high fat or \"junk\" foods? Yes and Yes    Do you have any problems taking medications regularly?  No    Do you have any side effects from medications? none    Needs assistance for the following daily activities: no assistance needed    Which of the following safety concerns are present in your home?  none identified     Hearing impairment: Yes, would like an audiology referral    In the past 6 months, have you been bothered by leaking of urine? yes    Mental Health:    In general, how would you rate your overall mental or emotional health? excellent  PHQ-2 Score:      Do you feel safe in your environment? Yes    Have you ever done Advance Care Planning? (For example, a Health Directive, POLST, or a discussion with a medical provider or your loved ones about your wishes): Yes, advance care planning is on file.    Additional concerns to address?  YES    Fall risk:  Fallen 2 or more times in the past year?: No  Any fall with injury in the past year?: No    Cognitive Screenin) Repeat 3 items (Leader, Season, Table)    2) Clock draw: NORMAL  3) 3 item recall: Recalls 3 objects  Results: 3 items recalled: COGNITIVE IMPAIRMENT LESS LIKELY    Mini-CogTM Copyright JONNATHAN Soliman. Licensed by the author " for use in [facility]; reprinted with permission (thomas@.[email or web address]). All rights reserved.      Do you have sleep apnea, excessive snoring or daytime drowsiness?: yes - no apneas, light snoring, tired end of the day    Calcium score over 3000 in 2017. Started on aspirin and statin. Increased atorvastatin from 40 to 80 mg daily in 2018. Tolerating increase.     Hx of thyroid ablation and on a stable dose of levothyroxine.    Notes some funny feeling in chest at time like funny heartbeats, but pulse normal when checked. No lightheadedness. No CP or SOB. He is able to climb stairs without concerning symptoms.    Shin pain persists, worse when swimming.        Reviewed and updated as needed this visit by clinical staff  Tobacco  Allergies  Meds   Med Hx  Surg Hx  Fam Hx  Soc Hx        Reviewed and updated as needed this visit by Provider  Tobacco  Allergies  Meds  Problems  Med Hx  Surg Hx  Fam Hx         Social History     Tobacco Use     Smoking status: Never Smoker     Smokeless tobacco: Never Used   Substance Use Topics     Alcohol use: Yes     Alcohol/week: 12.5 standard drinks     Comment: 1 shot daily                           Current providers sharing in care for this patient include:   Patient Care Team:  Fermín Mobley MD as PCP - General (Family Practice)  Fermín Mobley MD as Assigned PCP    The following health maintenance items are reviewed in Epic and correct as of today:  Health Maintenance   Topic Date Due     HEPATITIS C SCREENING  04/16/1963     ZOSTER IMMUNIZATION (2 of 3) 12/19/2012     FALL RISK ASSESSMENT  11/26/2020     MEDICARE ANNUAL WELLNESS VISIT  11/26/2020     COLORECTAL CANCER SCREENING  01/08/2021     DTAP/TDAP/TD IMMUNIZATION (4 - Td) 10/24/2022     LIPID  11/26/2024     ADVANCE CARE PLANNING  11/29/2024     PHQ-2  Completed     INFLUENZA VACCINE  Completed     Pneumococcal Vaccine: 65+ Years  Completed     Pneumococcal Vaccine: Pediatrics (0 to 5 Years) and  At-Risk Patients (6 to 64 Years)  Aged Out     IPV IMMUNIZATION  Aged Out     MENINGITIS IMMUNIZATION  Aged Out     HEPATITIS B IMMUNIZATION  Aged Out     Patient Active Problem List   Diagnosis     Actinic keratosis of scalp     Elevated coronary artery calcium score     Graves' disease     Hypothyroidism following radioiodine therapy     Seborrheic keratoses     Tubular adenoma of colon     ACP (advance care planning)     Past Surgical History:   Procedure Laterality Date     C THYROID ABLATION       CLOSED RX ELBOW DISLOCATION  2018    Dr Reich     COLONOSCOPY  2016    Dr Rosa     VASECTOMY      No Comments Provided       Social History     Tobacco Use     Smoking status: Never Smoker     Smokeless tobacco: Never Used   Substance Use Topics     Alcohol use: Yes     Alcohol/week: 12.5 standard drinks     Comment: 1 shot daily     Family History   Problem Relation Age of Onset     Other - See Comments Mother         natural  92     Diabetes Mother         Diabetes     Other - See Comments Father         pancreatic cancer  68     Heart Disease Father 64        Heart Disease,MI age 64         Current Outpatient Medications   Medication Sig Dispense Refill     aspirin EC 81 MG EC tablet Take 81 mg by mouth daily with food Take 1 tablet by mouth once daily with a meal.       atorvastatin (LIPITOR) 80 MG tablet Take 1 tablet (80 mg) by mouth daily (with dinner) 90 tablet 4     levothyroxine (SYNTHROID/LEVOTHROID) 100 MCG tablet Take 1 tablet (100 mcg) by mouth every morning (before breakfast) Take 1 tablet by mouth before breakfast. 90 tablet 4     No Known Allergies    ROS:  General: Denies general constitutional problems  Eyes: Denies problems  Ears/Nose/Throat: Denies problems  Cardiovascular: Denies problems  Respiratory: Denies problems  Gastrointestinal: Denies problems  Genitourinary: Denies problems  Musculoskeletal: see above  Skin: Denies problems  Neurologic: Denies problems  Psychiatric:  "Denies problems      OBJECTIVE:   There were no vitals taken for this visit. Estimated body mass index is 33.55 kg/m  as calculated from the following:    Height as of 11/26/19: 1.715 m (5' 7.5\").    Weight as of 11/26/19: 98.6 kg (217 lb 6.4 oz).  EXAM:   General Appearance: Pleasant, alert, appropriate appearance for age. No acute distress  Eye Exam:  Normal external eye, conjunctiva, lids, cornea. ALEJANDRA.  Ear Exam: Normal TM's bilaterally. Normal auditory canals and external ears.  OroPharynx Exam:  Dental hygiene adequate. Normal buccal mucosa. Normal pharynx.  Neck Exam:  Supple, no masses or nodes. No carotid bruits.  Thyroid Exam: No nodules or enlargement.  Chest/Respiratory Exam: Normal chest wall and respirations. Clear to auscultation.  Cardiovascular Exam: Regular rate and rhythm. S1, S2, no murmur, click, gallop, or rubs.  Gastrointestinal Exam: Soft, non-tender, no masses or organomegaly.  Musculoskeletal Exam: Tender in lateral shin (anterior tibialis) and anterior ankle tendons. No ankle effusion. Good ankle ROM.  Foot Exam: Left and right foot: good pedal pulses.  Skin: no rash or abnormalities  Neurologic Exam: Nonfocal, symmetric DTRs, normal gross motor, tone coordination and no tremor.  Psychiatric Exam: Alert and oriented - appropriate affect.      Results for orders placed or performed in visit on 11/30/20   Hemoglobin A1c     Status: None   Result Value Ref Range    Hemoglobin A1C 5.4 4.0 - 6.0 %   TSH Reflex GH     Status: None   Result Value Ref Range    TSH Reflex 0.93 0.34 - 5.60 IU/mL   Lipid Panel     Status: None   Result Value Ref Range    Cholesterol 123 <200 mg/dL    Triglycerides 62 <150 mg/dL    HDL Cholesterol 51 23 - 92 mg/dL    LDL Cholesterol Calculated 60 <100 mg/dL    Non HDL Cholesterol 72 <130 mg/dL   Comprehensive Metabolic Panel     Status: Abnormal   Result Value Ref Range    Sodium 140 134 - 144 mmol/L    Potassium 4.6 3.5 - 5.1 mmol/L    Chloride 106 98 - 107 mmol/L    " Carbon Dioxide 27 21 - 31 mmol/L    Anion Gap 7 3 - 14 mmol/L    Glucose 112 (H) 70 - 105 mg/dL    Urea Nitrogen 15 7 - 25 mg/dL    Creatinine 0.94 0.70 - 1.30 mg/dL    GFR Estimate 78 >60 mL/min/[1.73_m2]    GFR Estimate If Black >90 >60 mL/min/[1.73_m2]    Calcium 9.1 8.6 - 10.3 mg/dL    Bilirubin Total 0.7 0.3 - 1.0 mg/dL    Albumin 4.0 3.5 - 5.7 g/dL    Protein Total 7.1 6.4 - 8.9 g/dL    Alkaline Phosphatase 71 34 - 104 U/L    ALT 17 7 - 52 U/L    AST 18 13 - 39 U/L   CBC W PLT No Diff     Status: None   Result Value Ref Range    WBC 5.1 4.0 - 11.0 10e9/L    RBC Count 4.44 4.4 - 5.9 10e12/L    Hemoglobin 14.4 13.3 - 17.7 g/dL    Hematocrit 43.3 40.0 - 53.0 %    MCV 98 78 - 100 fl    MCH 32.4 26.5 - 33.0 pg    MCHC 33.3 31.5 - 36.5 g/dL    RDW 12.1 10.0 - 15.0 %    Platelet Count 269 150 - 450 10e9/L   ABO and Rh     Status: None   Result Value Ref Range    ABO A     RH(D) Pos     Specimen Expires 12/03/2020         EKG shows sinus bradycardia with 1st degree AV block 260 ms. No previous for comparison. No arrhythmia noted.    ASSESSMENT / PLAN:       ICD-10-CM    1. Encounter for annual wellness exam in Medicare patient  Z00.00 ABO and Rh     ABO and Rh   2. Hypothyroidism following radioiodine therapy  E89.0 levothyroxine (SYNTHROID/LEVOTHROID) 100 MCG tablet     TSH Reflex GH   3. Elevated coronary artery calcium score  R93.1 atorvastatin (LIPITOR) 80 MG tablet     Lipid Panel     Comprehensive Metabolic Panel   4. Palpitations  R00.2 EKG 12-lead, tracing only   5. 1st degree AV block  I44.0    6. Elevated fasting glucose  R73.01 Hemoglobin A1c     Hemoglobin A1c     Comprehensive Metabolic Panel   7. Encounter for therapeutic drug monitoring  Z51.81 TSH Reflex GH     Comprehensive Metabolic Panel   8. Atherosclerosis   I70.90 Lipid Panel     CBC W PLT No Diff   9. Bilateral hearing loss, unspecified hearing loss type  H91.93 AUDIOLOGY ADULT REFERRAL       Patient has been advised of split billing  "requirements and indicates understanding: Yes    EKG obtained due to palpitations and shows only 1st degree AV block. No other concerns. He has no irregular pulse noted. Certainly more evaluation for atrial fibrillation could be performed, but does not appear indicated based on current symptoms.    Labs obtained for medication monitoring and are stable.    Referral placed to audiology for bilateral hearing loss.  He will contemplate hearing aids.      COUNSELING:  Reviewed preventive health counseling, as reflected in patient instructions       Regular exercise       Healthy diet/nutrition       Vision screening       Hearing screening       Dental care       Bladder control       Fall risk prevention       Immunizations    Shingrix from pharmacy       Aspirin Prophylaxsis - yes for high calcium score       Colon cancer screening - due 2021    Estimated body mass index is 33.55 kg/m  as calculated from the following:    Height as of 11/26/19: 1.715 m (5' 7.5\").    Weight as of 11/26/19: 98.6 kg (217 lb 6.4 oz).    Weight management plan: Discussed healthy diet and exercise guidelines    He reports that he has never smoked. He has never used smokeless tobacco.    Appropriate preventive services were discussed with this patient, including applicable screening as appropriate for cardiovascular disease, diabetes, osteopenia/osteoporosis, and glaucoma.  As appropriate for age/gender, discussed screening for colorectal cancer, prostate cancer, breast cancer, and cervical cancer. Checklist reviewing preventive services available has been given to the patient.    Reviewed patients plan of care and provided an AVS. The Basic Care Plan (routine screening as documented in Health Maintenance) for Justino meets the Care Plan requirement. This Care Plan has been established and reviewed with the Patient.    Counseling Resources:  ATP IV Guidelines  Pooled Cohorts Equation Calculator  Breast Cancer Risk Calculator  BRCA-Related " Cancer Risk Assessment: FHS-7 Tool  FRAX Risk Assessment  ICSI Preventive Guidelines  Dietary Guidelines for Americans, 2010  Schoology's MyPlate  ASA Prophylaxis  Lung CA Screening    Fermín Mobley MD  [facility]

## 2020-11-30 NOTE — LETTER
December 1, 2020      Justino Wang  [address] [address]        Dear ,    We are writing to inform you of your test results. You have A+ blood type. The A1c is normal, not in the prediabetes or diabetes range. The blood sugar is elevated, but since you were not fasting when lab was drawn, we will use the A1c result.    Thyroid testing is normal range. Cholesterol values look good on medication. Electrolytes, liver, and kidney tests are fine.  Blood counts are normal.    Resulted Orders   ABO and Rh   Result Value Ref Range    ABO A     RH(D) Pos     Specimen Expires 12/03/2020    Hemoglobin A1c   Result Value Ref Range    Hemoglobin A1C 5.4 4.0 - 6.0 %   TSH Reflex GH   Result Value Ref Range    TSH Reflex 0.93 0.34 - 5.60 IU/mL   Lipid Panel   Result Value Ref Range    Cholesterol 123 <200 mg/dL    Triglycerides 62 <150 mg/dL    HDL Cholesterol 51 23 - 92 mg/dL    LDL Cholesterol Calculated 60 <100 mg/dL      Comment:      Desirable:       <100 mg/dl    Non HDL Cholesterol 72 <130 mg/dL   Comprehensive Metabolic Panel   Result Value Ref Range    Sodium 140 134 - 144 mmol/L    Potassium 4.6 3.5 - 5.1 mmol/L    Chloride 106 98 - 107 mmol/L    Carbon Dioxide 27 21 - 31 mmol/L    Anion Gap 7 3 - 14 mmol/L    Glucose 112 (H) 70 - 105 mg/dL    Urea Nitrogen 15 7 - 25 mg/dL    Creatinine 0.94 0.70 - 1.30 mg/dL    GFR Estimate 78 >60 mL/min/[1.73_m2]    GFR Estimate If Black >90 >60 mL/min/[1.73_m2]    Calcium 9.1 8.6 - 10.3 mg/dL    Bilirubin Total 0.7 0.3 - 1.0 mg/dL    Albumin 4.0 3.5 - 5.7 g/dL    Protein Total 7.1 6.4 - 8.9 g/dL    Alkaline Phosphatase 71 34 - 104 U/L    ALT 17 7 - 52 U/L    AST 18 13 - 39 U/L   CBC W PLT No Diff   Result Value Ref Range    WBC 5.1 4.0 - 11.0 10e9/L    RBC Count 4.44 4.4 - 5.9 10e12/L    Hemoglobin 14.4 13.3 - 17.7 g/dL    Hematocrit 43.3 40.0 - 53.0 %    MCV 98 78 - 100 fl    MCH 32.4 26.5 - 33.0 pg    MCHC 33.3 31.5 - 36.5 g/dL    RDW 12.1 10.0 -  15.0 %    Platelet Count 269 150 - 450 10e9/L       If you have any questions or concerns, please call the clinic at the number listed above.       Sincerely,    Fermín Mobley MD  Electronically signed

## 2020-12-01 PROBLEM — I44.0 1ST DEGREE AV BLOCK: Status: ACTIVE | Noted: 2020-12-01

## 2020-12-01 LAB — INTERPRETATION ECG - MUSE: NORMAL

## 2020-12-01 ASSESSMENT — ANXIETY QUESTIONNAIRES: GAD7 TOTAL SCORE: 0

## 2020-12-11 ENCOUNTER — TRANSFERRED RECORDS (OUTPATIENT)
Dept: HEALTH INFORMATION MANAGEMENT | Facility: OTHER | Age: 75
End: 2020-12-11

## 2021-01-18 ENCOUNTER — MYC MEDICAL ADVICE (OUTPATIENT)
Dept: FAMILY MEDICINE | Facility: OTHER | Age: 76
End: 2021-01-18

## 2021-02-09 ENCOUNTER — IMMUNIZATION (OUTPATIENT)
Dept: FAMILY MEDICINE | Facility: OTHER | Age: 76
End: 2021-02-09
Attending: FAMILY MEDICINE
Payer: MEDICARE

## 2021-02-09 PROCEDURE — 91300 PR COVID VAC PFIZER DIL RECON 30 MCG/0.3 ML IM: CPT

## 2021-03-02 ENCOUNTER — IMMUNIZATION (OUTPATIENT)
Dept: FAMILY MEDICINE | Facility: OTHER | Age: 76
End: 2021-03-02
Attending: FAMILY MEDICINE
Payer: MEDICARE

## 2021-03-02 PROCEDURE — 91300 PR COVID VAC PFIZER DIL RECON 30 MCG/0.3 ML IM: CPT

## 2021-05-02 ENCOUNTER — ALLIED HEALTH/NURSE VISIT (OUTPATIENT)
Dept: FAMILY MEDICINE | Facility: OTHER | Age: 76
End: 2021-05-02
Attending: FAMILY MEDICINE
Payer: MEDICARE

## 2021-05-02 DIAGNOSIS — Z20.822 COVID-19 RULED OUT: Primary | ICD-10-CM

## 2021-05-02 LAB
LABORATORY COMMENT REPORT: NORMAL
SARS-COV-2 RNA RESP QL NAA+PROBE: NEGATIVE
SARS-COV-2 RNA RESP QL NAA+PROBE: NORMAL
SPECIMEN SOURCE: NORMAL
SPECIMEN SOURCE: NORMAL

## 2021-05-02 PROCEDURE — C9803 HOPD COVID-19 SPEC COLLECT: HCPCS

## 2021-05-02 PROCEDURE — U0003 INFECTIOUS AGENT DETECTION BY NUCLEIC ACID (DNA OR RNA); SEVERE ACUTE RESPIRATORY SYNDROME CORONAVIRUS 2 (SARS-COV-2) (CORONAVIRUS DISEASE [COVID-19]), AMPLIFIED PROBE TECHNIQUE, MAKING USE OF HIGH THROUGHPUT TECHNOLOGIES AS DESCRIBED BY CMS-2020-01-R: HCPCS | Mod: ZL | Performed by: FAMILY MEDICINE

## 2021-05-02 PROCEDURE — U0005 INFEC AGEN DETEC AMPLI PROBE: HCPCS | Mod: ZL | Performed by: FAMILY MEDICINE

## 2021-05-06 ENCOUNTER — TRANSFERRED RECORDS (OUTPATIENT)
Dept: HEALTH INFORMATION MANAGEMENT | Facility: OTHER | Age: 76
End: 2021-05-06

## 2021-09-05 ENCOUNTER — HOSPITAL ENCOUNTER (EMERGENCY)
Facility: OTHER | Age: 76
Discharge: HOME | End: 2021-09-05
Attending: STUDENT IN AN ORGANIZED HEALTH CARE EDUCATION/TRAINING PROGRAM | Admitting: STUDENT IN AN ORGANIZED HEALTH CARE EDUCATION/TRAINING PROGRAM
Payer: MEDICARE

## 2021-09-05 ENCOUNTER — APPOINTMENT (OUTPATIENT)
Dept: GENERAL RADIOLOGY | Facility: OTHER | Age: 76
End: 2021-09-05
Attending: STUDENT IN AN ORGANIZED HEALTH CARE EDUCATION/TRAINING PROGRAM
Payer: MEDICARE

## 2021-09-05 VITALS
WEIGHT: 222.66 LBS | RESPIRATION RATE: 18 BRPM | HEART RATE: 56 BPM | BODY MASS INDEX: 34.11 KG/M2 | SYSTOLIC BLOOD PRESSURE: 170 MMHG | OXYGEN SATURATION: 95 % | TEMPERATURE: 97 F | DIASTOLIC BLOOD PRESSURE: 77 MMHG

## 2021-09-05 DIAGNOSIS — M25.461 EFFUSION OF RIGHT KNEE: ICD-10-CM

## 2021-09-05 DIAGNOSIS — M25.561 ACUTE PAIN OF RIGHT KNEE: ICD-10-CM

## 2021-09-05 PROCEDURE — 73562 X-RAY EXAM OF KNEE 3: CPT | Mod: RT

## 2021-09-05 PROCEDURE — 99282 EMERGENCY DEPT VISIT SF MDM: CPT | Performed by: STUDENT IN AN ORGANIZED HEALTH CARE EDUCATION/TRAINING PROGRAM

## 2021-09-05 PROCEDURE — 99283 EMERGENCY DEPT VISIT LOW MDM: CPT | Performed by: STUDENT IN AN ORGANIZED HEALTH CARE EDUCATION/TRAINING PROGRAM

## 2021-09-05 NOTE — DISCHARGE INSTRUCTIONS
Like we discussed, I suspect that you have a meniscal injury in your knee.  Your x-ray did not show any fracture.  Use crutches provided here to take some of the pressure off your leg when walking.  Use ibuprofen and ice applied to the affected area for pain relief.  Please call [phone] to schedule your appointment with orthopedics here in [place].

## 2021-09-05 NOTE — ED PROVIDER NOTES
Emergency Department Provider Note  : 1945 Age: 76 year old Sex: male MRN: [id number]    Chief Complaint   Patient presents with     Knee Pain       Medical Decision Making / Assessment / Plan   76 year old male who presents with acute right knee injury and obvious joint effusion after hearing a pop while twisting his knee.  Exam shows no increased joint laxity and anterior and posterior drawer test are negative.  I am unable to reproduce any clicking but based on patient story and exam, suspect meniscal injury.  Will send for x-ray to rule out fracture and suspect the patient will be discharged with crutches, instructions for NSAID and ice application, and orthopedic follow-up.  No indication for emergent MRI here today.    As expected, x-ray shows joint effusion but no fracture.  Given crutches and discharged with Ortho follow-up.    The patient was informed of the plan and verbalized understanding and agreed with the plan. The patient was given strict return to Emergency Department precautions as well as appropriate follow up instructions. The patient was discharged in stable condition.    Discharge Medication List as of 2021 12:56 PM          Final diagnoses:   Effusion of right knee   Acute pain of right knee       Terry Gardner MD  2021   Emergency Department    Alena Badillo is a 76 year old male with PMH of Graves' who presents at 11:35 AM for evaluation of acute onset right knee pain yesterday after twisting his knee, hearing a pop, and falling on a dock.  No other significant trauma.  Following this injury, he has been able to ambulate without significant difficulty and swim without any significant pain.  He noticed increasing swelling this morning and has been using a cane to help ambulate as he has little bit of pain in the right knee focally when doing so.  No other injuries.  No prior surgeries on her knee.  No numbness, tingling, weakness.    I have reviewed the Medications,  Allergies, Past Medical and Surgical History, and Social History in the Epic System and with family.    Review of Systems:  Please see HPI for pertinent positives and negatives. All other systems reviewed and found to be negative.      Objective   BP: (!) 170/77  Pulse: 56  Temp: 97  F (36.1  C)  Resp: 18  Weight: 101 kg (222 lb 10.6 oz)  SpO2: 95 %    Physical Exam:   Gen: Comfortable. NAD  HEENT: MMM. AT/NC.  Eye: PERRL  CV:  Well-perfused right lower extremity with intact DP pulse.  Pulm: Nonlabored, equal chest rise  Abd: ND. No apparent trauma.  Ext:  Obvious joint effusion moderate in size of the right knee.  Mild reproducible pain diffusely across the joint line without focality.  No increased joint laxity with valgus or varus stress.  Negative posterior and anterior drawer test.  Negative Lachman's.  No focal pain over the patella.  Full range of motion of the right knee without significant discomfort.  Neuro:  Motor and sensation grossly intact in the right lower extremity.  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     Current Outpatient Medications   Medication     aspirin EC 81  MG EC tablet     atorvastatin (LIPITOR) 80 MG tablet     levothyroxine (SYNTHROID/LEVOTHROID) 100 MCG tablet       Allergies:  Patient has no known allergies.    Relevant labs, images, EKGs, Epic and outside hospital (if applicable) charts were reviewed. The findings, diagnosis, plan, and need for follow up were discussed with the patient/family. Nursing notes were reviewed.

## 2021-09-05 NOTE — ED TRIAGE NOTES
"Patient presents to ED, ambulatory from home for c/o injuring right knee yesterday afternoon. States he was sitting on his dock and was trying to get up and ended up twisting his right leg - felt a \"pop\" and has been having increased swelling and pain to right knee since.  "

## 2021-09-20 ENCOUNTER — OFFICE VISIT (OUTPATIENT)
Dept: ORTHOPEDICS | Facility: OTHER | Age: 76
End: 2021-09-20
Attending: STUDENT IN AN ORGANIZED HEALTH CARE EDUCATION/TRAINING PROGRAM
Payer: MEDICARE

## 2021-09-20 VITALS
DIASTOLIC BLOOD PRESSURE: 68 MMHG | WEIGHT: 222 LBS | RESPIRATION RATE: 16 BRPM | BODY MASS INDEX: 34 KG/M2 | SYSTOLIC BLOOD PRESSURE: 148 MMHG | OXYGEN SATURATION: 96 % | HEART RATE: 56 BPM

## 2021-09-20 DIAGNOSIS — M25.561 RIGHT KNEE PAIN, UNSPECIFIED CHRONICITY: Primary | ICD-10-CM

## 2021-09-20 DIAGNOSIS — M54.16 RIGHT LUMBAR RADICULOPATHY: ICD-10-CM

## 2021-09-20 PROCEDURE — 99203 OFFICE O/P NEW LOW 30 MIN: CPT | Performed by: ORTHOPAEDIC SURGERY

## 2021-09-20 PROCEDURE — G0463 HOSPITAL OUTPT CLINIC VISIT: HCPCS

## 2021-09-20 RX ORDER — COVID-19 ANTIGEN TEST
KIT MISCELLANEOUS
Status: ON HOLD | COMMUNITY
Start: 2021-01-01 | End: 2023-02-22

## 2021-09-20 ASSESSMENT — PAIN SCALES - GENERAL: PAINLEVEL: MILD PAIN (2)

## 2021-09-20 NOTE — PROGRESS NOTES
Visit Date: 09/20/2021    A 76-year-old gentleman with right knee pain and right lower leg pain.    HISTORY OF PRESENT ILLNESS:  Justino Wang is a 76-year-old gentleman with right knee pain going back a month or so, when he was trying to get out of a boat.  He was out fishing with his granddaughter and was trying to get out of a boat, when he kind of twisted his right knee and felt pain.  This is not the first time this has happened, he had this same problem last summer as well.  The pain is located kind of anterior on the knee just off of the anterolateral patella.  It is sharp and stabbing in nature and can give him a significant amount of pain, especially if he kneels on the knee.  He has a greater problem, however, since he injured the knee and that is that he has more pain now going down past the knee and into the lateral border of his calf and across the front of his leg lower down on the right leg.    PHYSICAL EXAMINATION:    GENERAL:  Today, this is a 76-year-old gentleman in no acute distress, very pleasant on examination.  He is obese.  Walks with an antalgic gait and is also kind of hunched over at the lumbar spine.   EXTREMITIES:  Examination of the knee shows that he is stable to varus and valgus stress.  Has full range of motion of the knee.  No signs of trauma whatsoever.  He does have some dependent edema in the right lower extremity.  He is tender to palpation at the inferior pole of the patella laterally as well as right over the lateral femoral condyle with the knee in 90 degrees of flexion.  Otherwise, no other signs of trauma.  He is stable to varus and valgus stress.  Negative Lachman's, negative posterior drawer.  He is tender to palpation at the lateral joint line as well and nontender to palpation at the medial joint line.  He does have an equivocal straight leg raise with pain increasing in the right lower extremity with extension.  He is otherwise neuro and vascularly intact.    X-ray  examination of the knee shows just some extremely mild signs of osteoarthritis within the right knee.  No significant osteophyte formation is really appreciated.    IMPRESSION AND PLAN:  A 76-year-old gentleman who likely has some degree of lateral right knee plica that is causing a sharp stabbing pain, but he is tender to palpation at the lateral joint line and did feel a pop, which could be the plica or a meniscus tear.  We are going to order an MRI of his right knee.  He also has pain going down past the knee, and this is not consistent with a meniscus tear or a plica.  I think this is probably a longstanding lumbar spine issue.  We are going to obtain an MRI of his lumbar spine as well, and I will call him back with the results of that.     Aris Ann MD        D: 2021   T: 2021   MT: [id number]    Name:     KATELYNN HARO  MRN:      [id number]        Account:    [id number]   :      1945           Visit Date: 2021     Document: [id number]

## 2021-09-20 NOTE — PROGRESS NOTES
Chief Complaint   Patient presents with     Consult     right knee pain       Yuridia Alvarado LPN

## 2021-10-03 ENCOUNTER — HEALTH MAINTENANCE LETTER (OUTPATIENT)
Age: 76
End: 2021-10-03

## 2021-10-04 ENCOUNTER — MYC MEDICAL ADVICE (OUTPATIENT)
Dept: FAMILY MEDICINE | Facility: OTHER | Age: 76
End: 2021-10-04

## 2021-10-11 ENCOUNTER — IMMUNIZATION (OUTPATIENT)
Dept: FAMILY MEDICINE | Facility: OTHER | Age: 76
End: 2021-10-11
Attending: FAMILY MEDICINE
Payer: MEDICARE

## 2021-10-11 PROCEDURE — 91300 PR COVID VAC PFIZER DIL RECON 30 MCG/0.3 ML IM: CPT

## 2022-01-03 ENCOUNTER — HOSPITAL ENCOUNTER (OUTPATIENT)
Dept: GENERAL RADIOLOGY | Facility: OTHER | Age: 77
End: 2022-01-03
Attending: FAMILY MEDICINE
Payer: MEDICARE

## 2022-01-03 ENCOUNTER — OFFICE VISIT (OUTPATIENT)
Dept: FAMILY MEDICINE | Facility: OTHER | Age: 77
End: 2022-01-03
Attending: FAMILY MEDICINE
Payer: MEDICARE

## 2022-01-03 VITALS
HEART RATE: 64 BPM | OXYGEN SATURATION: 95 % | BODY MASS INDEX: 35 KG/M2 | HEIGHT: 67 IN | RESPIRATION RATE: 20 BRPM | TEMPERATURE: 97.6 F | WEIGHT: 223 LBS | DIASTOLIC BLOOD PRESSURE: 73 MMHG | SYSTOLIC BLOOD PRESSURE: 128 MMHG

## 2022-01-03 DIAGNOSIS — M16.11 ARTHRITIS OF RIGHT HIP: ICD-10-CM

## 2022-01-03 DIAGNOSIS — Z23 NEED FOR PROPHYLACTIC VACCINATION AND INOCULATION AGAINST INFLUENZA: ICD-10-CM

## 2022-01-03 DIAGNOSIS — Z00.00 ENCOUNTER FOR MEDICARE ANNUAL WELLNESS EXAM: Primary | ICD-10-CM

## 2022-01-03 DIAGNOSIS — R93.1 ELEVATED CORONARY ARTERY CALCIUM SCORE: ICD-10-CM

## 2022-01-03 DIAGNOSIS — E78.5 DYSLIPIDEMIA: ICD-10-CM

## 2022-01-03 DIAGNOSIS — E89.0 HYPOTHYROIDISM FOLLOWING RADIOIODINE THERAPY: ICD-10-CM

## 2022-01-03 LAB
ALBUMIN SERPL-MCNC: 4.2 G/DL (ref 3.5–5.7)
ALP SERPL-CCNC: 75 U/L (ref 34–104)
ALT SERPL W P-5'-P-CCNC: 13 U/L (ref 7–52)
ANION GAP SERPL CALCULATED.3IONS-SCNC: 7 MMOL/L (ref 3–14)
AST SERPL W P-5'-P-CCNC: 15 U/L (ref 13–39)
BILIRUB SERPL-MCNC: 0.9 MG/DL (ref 0.3–1)
BUN SERPL-MCNC: 16 MG/DL (ref 7–25)
CALCIUM SERPL-MCNC: 9.6 MG/DL (ref 8.6–10.3)
CHLORIDE BLD-SCNC: 104 MMOL/L (ref 98–107)
CHOLEST SERPL-MCNC: 168 MG/DL
CO2 SERPL-SCNC: 27 MMOL/L (ref 21–31)
CREAT SERPL-MCNC: 0.83 MG/DL (ref 0.7–1.3)
FASTING STATUS PATIENT QL REPORTED: NORMAL
GFR SERPL CREATININE-BSD FRML MDRD: 90 ML/MIN/1.73M2
GLUCOSE BLD-MCNC: 99 MG/DL (ref 70–105)
HDLC SERPL-MCNC: 62 MG/DL (ref 23–92)
LDLC SERPL CALC-MCNC: 91 MG/DL
NONHDLC SERPL-MCNC: 106 MG/DL
POTASSIUM BLD-SCNC: 4.5 MMOL/L (ref 3.5–5.1)
PROT SERPL-MCNC: 7.2 G/DL (ref 6.4–8.9)
SODIUM SERPL-SCNC: 138 MMOL/L (ref 134–144)
TRIGL SERPL-MCNC: 76 MG/DL
TSH SERPL DL<=0.005 MIU/L-ACNC: 1.22 MU/L (ref 0.4–4)

## 2022-01-03 PROCEDURE — 84443 ASSAY THYROID STIM HORMONE: CPT | Mod: ZL | Performed by: FAMILY MEDICINE

## 2022-01-03 PROCEDURE — 36415 COLL VENOUS BLD VENIPUNCTURE: CPT | Mod: ZL | Performed by: FAMILY MEDICINE

## 2022-01-03 PROCEDURE — 80061 LIPID PANEL: CPT | Mod: ZL | Performed by: FAMILY MEDICINE

## 2022-01-03 PROCEDURE — 99212 OFFICE O/P EST SF 10 MIN: CPT | Mod: 25 | Performed by: FAMILY MEDICINE

## 2022-01-03 PROCEDURE — G0463 HOSPITAL OUTPT CLINIC VISIT: HCPCS | Mod: 25

## 2022-01-03 PROCEDURE — G0439 PPPS, SUBSEQ VISIT: HCPCS | Performed by: FAMILY MEDICINE

## 2022-01-03 PROCEDURE — 80053 COMPREHEN METABOLIC PANEL: CPT | Mod: ZL | Performed by: FAMILY MEDICINE

## 2022-01-03 PROCEDURE — G0008 ADMIN INFLUENZA VIRUS VAC: HCPCS

## 2022-01-03 PROCEDURE — 73502 X-RAY EXAM HIP UNI 2-3 VIEWS: CPT

## 2022-01-03 RX ORDER — LEVOTHYROXINE SODIUM 100 UG/1
100 TABLET ORAL
Qty: 90 TABLET | Refills: 4 | Status: SHIPPED | OUTPATIENT
Start: 2022-01-03 | End: 2023-01-09

## 2022-01-03 RX ORDER — ATORVASTATIN CALCIUM 80 MG/1
80 TABLET, FILM COATED ORAL
Qty: 90 TABLET | Refills: 4 | Status: SHIPPED | OUTPATIENT
Start: 2022-01-03 | End: 2023-01-09

## 2022-01-03 ASSESSMENT — ANXIETY QUESTIONNAIRES
3. WORRYING TOO MUCH ABOUT DIFFERENT THINGS: NOT AT ALL
GAD7 TOTAL SCORE: 0
4. TROUBLE RELAXING: NOT AT ALL
GAD7 TOTAL SCORE: 0
GAD7 TOTAL SCORE: 0
5. BEING SO RESTLESS THAT IT IS HARD TO SIT STILL: NOT AT ALL
1. FEELING NERVOUS, ANXIOUS, OR ON EDGE: NOT AT ALL
6. BECOMING EASILY ANNOYED OR IRRITABLE: NOT AT ALL
7. FEELING AFRAID AS IF SOMETHING AWFUL MIGHT HAPPEN: NOT AT ALL
7. FEELING AFRAID AS IF SOMETHING AWFUL MIGHT HAPPEN: NOT AT ALL
2. NOT BEING ABLE TO STOP OR CONTROL WORRYING: NOT AT ALL

## 2022-01-03 ASSESSMENT — ACTIVITIES OF DAILY LIVING (ADL): CURRENT_FUNCTION: NO ASSISTANCE NEEDED

## 2022-01-03 ASSESSMENT — PATIENT HEALTH QUESTIONNAIRE - PHQ9
10. IF YOU CHECKED OFF ANY PROBLEMS, HOW DIFFICULT HAVE THESE PROBLEMS MADE IT FOR YOU TO DO YOUR WORK, TAKE CARE OF THINGS AT HOME, OR GET ALONG WITH OTHER PEOPLE: NOT DIFFICULT AT ALL
SUM OF ALL RESPONSES TO PHQ QUESTIONS 1-9: 0
SUM OF ALL RESPONSES TO PHQ QUESTIONS 1-9: 0

## 2022-01-03 ASSESSMENT — MIFFLIN-ST. JEOR: SCORE: 1690.15

## 2022-01-03 ASSESSMENT — PAIN SCALES - GENERAL: PAINLEVEL: NO PAIN (0)

## 2022-01-03 NOTE — NURSING NOTE
Immunization Documentation    Prior to Immunization administration, verified patients identity using patient's name and date of birth. Please see IMMUNIZATIONS  and order for additional information.  Patient / Parent instructed to remain in clinic for 15 minutes and report any adverse reaction to staff immediately.    Was entire vial of medication used? Yes  Vial/Syringe: Jc Rosenthal LPN  1/3/2022   5:15 PM

## 2022-01-03 NOTE — PATIENT INSTRUCTIONS
Consider Shingrix vaccine. Tetanus due in Oct 2022.    Hip shows arthritis  Tylenol is safe for pain  Limit use of ibuprofen and naproxen    Patient Education   Personalized Prevention Plan  You are due for the preventive services outlined below.  Your care team is available to assist you in scheduling these services.  If you have already completed any of these items, please share that information with your care team to update in your medical record.  Health Maintenance Due   Topic Date Due     Hepatitis C Screening  Never done     Flu Vaccine (1) 09/01/2021

## 2022-01-03 NOTE — PROGRESS NOTES
"SUBJECTIVE:   Justino Wang is a 76 year old male who presents for Preventive Visit.    Answers for HPI/ROS submitted by the patient on 1/3/2022  If you checked off any problems, how difficult have these problems made it for you to do your work, take care of things at home, or get along with other people?: Not difficult at all  PHQ9 TOTAL SCORE: 0  AIDA 7 TOTAL SCORE: 0  In general, how would you rate your overall physical health?: good  Do you usually eat at least 4 servings of fruit and vegetables a day, include whole grains & fiber, and avoid regularly eating high fat or \"junk\" foods? : Yes  Taking medications regularly:: Yes  Medication side effects:: Not applicable  Activities of Daily Living: no assistance needed  Home safety: no safety concerns identified  Hearing Impairment:: need to ask people to speak up or repeat themselves  In the past 6 months, have you been bothered by leaking of urine?: Yes  In general, how would you rate your overall mental or emotional health?: excellent  Additional concerns today:: No  Duration of exercise:: Less than 15 minutes    Patient has been advised of split billing requirements and indicates understanding: Yes  Are you in the first 12 months of your Medicare Part B coverage?  No    Physical Health:    In general, how would you rate your overall physical health? good    Outside of work, how many days during the week do you exercise? 6-7 days/week-nice weather only    Outside of work, approximately how many minutes a day do you exercise?greater than 60 minutes-nice weather only    If you drink alcohol do you typically have >3 drinks per day or >7 drinks per week? YES    Do you usually eat at least 4 servings of fruit and vegetables a day, include whole grains & fiber and avoid regularly eating high fat or \"junk\" foods? Yes and Yes    Do you have any problems taking medications regularly?  YES-timing of Lipitor    Do you have any side effects from medications? none    Needs " assistance for the following daily activities: no assistance needed    Which of the following safety concerns are present in your home?   lack of grab bars in the bathroom, lack of handrails on stairs and Cabin only with these safety concerns.     Hearing impairment: No    In the past 6 months, have you been bothered by leaking of urine? yes    Mental Health:    In general, how would you rate your overall mental or emotional health? good  PHQ-2 Score: (P) 0    Do you feel safe in your environment? Yes    Have you ever done Advance Care Planning? (For example, a Health Directive, POLST, or a discussion with a medical provider or your loved ones about your wishes): Yes, advance care planning is on file.    Additional concerns to address?  No    Fall risk:  Fallen 2 or more times in the past year?: No  Any fall with injury in the past year?: No    Cognitive Screenin) Repeat 3 items (Apple, Table, Oksana)   2) Clock draw: NORMAL  3) 3 item recall: Recalls 3 objects  Results: 3 items recalled: COGNITIVE IMPAIRMENT LESS LIKELY    Mini-CogTM Copyright JONNATHAN Soliman. Licensed by the author for use in [facility]; reprinted with permission ([email or web address]). All rights reserved.      Do you have sleep apnea, excessive snoring or daytime drowsiness?: no    On atorvastatin 80 mg daily and aspirin for calcium score of over 3000 and 2017.  Has not had any cardiac symptoms and has not undergone stress testing.    History of thyroid ablation on a stable dose of levothyroxine.  Takes it consistently in the morning.    He had a right knee effusion in 2021, but symptoms have resolved.  There was discussion about performing an MRI, but that got rescheduled and since then his knee pain is better.    In the office today, he has trouble getting out of the chair.  He relates this to some stiffness.  On exam has some pain at the knee and right hip.    Reviewed and updated as needed this visit by clinical staff  Tobacco   Allergies  Meds  Problems  Med Hx  Surg Hx  Fam Hx  Soc Hx         Reviewed and updated as needed this visit by Provider  Tobacco  Allergies  Meds  Problems  Med Hx  Surg Hx  Fam Hx        Social History     Tobacco Use     Smoking status: Never Smoker     Smokeless tobacco: Never Used   Substance Use Topics     Alcohol use: Yes     Alcohol/week: 12.5 standard drinks     Comment: 1 shot and 1 glass of wine daily.                           Current providers sharing in care for this patient include:   Patient Care Team:  Fermín oMbley MD as PCP - General (Family Practice)  Fermín Mobley MD as Assigned PCP  Aris Ann MD as Assigned Musculoskeletal Provider    The following health maintenance items are reviewed in Epic and correct as of today:  Health Maintenance   Topic Date Due     HEPATITIS C SCREENING  Never done     ZOSTER IMMUNIZATION (2 of 3) 12/19/2012     DTAP/TDAP/TD IMMUNIZATION (4 - Td or Tdap) 10/24/2022     MEDICARE ANNUAL WELLNESS VISIT  01/03/2023     FALL RISK ASSESSMENT  01/03/2023     COLORECTAL CANCER SCREENING  05/06/2026     LIPID  01/03/2027     ADVANCE CARE PLANNING  01/03/2027     PHQ-2  Completed     INFLUENZA VACCINE  Completed     Pneumococcal Vaccine: 65+ Years  Completed     COVID-19 Vaccine  Completed     IPV IMMUNIZATION  Aged Out     MENINGITIS IMMUNIZATION  Aged Out     HEPATITIS B IMMUNIZATION  Aged Out     Patient Active Problem List   Diagnosis     Actinic keratosis of scalp     Elevated coronary artery calcium score     Graves' disease     Hypothyroidism following radioiodine therapy     Seborrheic keratoses     Tubular adenoma of colon     ACP (advance care planning)     1st degree AV block     Past Surgical History:   Procedure Laterality Date     C THYROID ABLATION       CLOSED RX ELBOW DISLOCATION  07/2018    Dr Reich     COLONOSCOPY  11/18/2016    Dr Rosa     COLONOSCOPY  05/06/2021    normal     VASECTOMY      No Comments Provided       Social  "History     Tobacco Use     Smoking status: Never Smoker     Smokeless tobacco: Never Used   Substance Use Topics     Alcohol use: Yes     Alcohol/week: 12.5 standard drinks     Comment: 1 shot and 1 glass of wine daily.     Family History   Problem Relation Age of Onset     Other - See Comments Mother         natural  92     Diabetes Mother         Diabetes     Other - See Comments Father         pancreatic cancer  68     Heart Disease Father 64        Heart Disease,MI age 64         Current Outpatient Medications   Medication Sig Dispense Refill     aspirin EC 81 MG EC tablet Take 81 mg by mouth daily with food Take 1 tablet by mouth once daily with a meal.       atorvastatin (LIPITOR) 80 MG tablet Take 1 tablet (80 mg) by mouth daily (with dinner) 90 tablet 4     levothyroxine (SYNTHROID/LEVOTHROID) 100 MCG tablet Take 1 tablet (100 mcg) by mouth every morning (before breakfast) Take 1 tablet by mouth before breakfast. 90 tablet 4     naproxen sodium (ALEVE) 220 MG capsule        No Known Allergies      ROS:  General: Denies general constitutional problems  Eyes: Denies problems  Ears/Nose/Throat: Denies problems  Cardiovascular: Denies problems  Respiratory: Denies problems  Gastrointestinal: Denies problems  Genitourinary: Denies problems  Musculoskeletal: as above  Skin: Denies problems  Neurologic: Denies problems  Psychiatric: Denies problems      OBJECTIVE:   /73 (BP Location: Left arm, Patient Position: Sitting, Cuff Size: Adult Large)   Pulse 64   Temp 97.6  F (36.4  C) (Tympanic)   Resp 20   Ht 1.702 m (5' 7\")   Wt 101.2 kg (223 lb)   SpO2 95%   BMI 34.93 kg/m   Estimated body mass index is 34.93 kg/m  as calculated from the following:    Height as of this encounter: 1.702 m (5' 7\").    Weight as of this encounter: 101.2 kg (223 lb).  EXAM:   General Appearance: Pleasant, alert, appropriate appearance for age. No acute distress  Eye Exam:  Normal external eye, conjunctiva, lids, cornea. " ALEJANDRA.  Ear Exam: Normal TM's bilaterally. Normal auditory canals and external ears.  OroPharynx Exam:  Dental hygiene adequate. Normal buccal mucosa. Normal pharynx.  Neck Exam:  Supple, no masses or nodes. No carotid bruits.  Thyroid Exam: No nodules or enlargement.  Chest/Respiratory Exam: Normal chest wall and respirations. Clear to auscultation.  Cardiovascular Exam: Regular rate and rhythm. S1, S2, no murmur, click, gallop, or rubs.  Gastrointestinal Exam: Soft, non-tender, no masses or organomegaly.  Musculoskeletal Exam: Pain in the right hip and knee with right leg rotation.  However with exam maneuvers, able to elicit pain only in the right groin and lateral hip and none in the knee with leg rotation.  Knee is nontender to palpation.  No effusion.  Foot Exam: Left and right foot: good pedal pulses.  Skin: no rash or abnormalities  Neurologic Exam: Nonfocal, symmetric DTRs, normal gross motor, tone coordination and no tremor.  Psychiatric Exam: Alert and oriented - appropriate affect.      Results for orders placed or performed during the hospital encounter of 01/03/22   XR Pelvis and Hip Right 1 View     Status: None    Narrative    PROCEDURE: XR PELVIS AND HIP RIGHT 1 VIEW 1/3/2022 4:54 PM    HISTORY: Arthritis of right hip    COMPARISONS: None.    TECHNIQUE: Pelvis 2 views, right hip 2 views    FINDINGS: Pelvis: The pelvis is intact. The sacrum and sacroiliac  joints appear normal. There is moderate joint space narrowing at the  left hip. There is a left femoral neck bone which may impinge on the  acetabulum.    Right hip 2 views there is severe joint space narrowing at the right  hip. Degenerative osteophyte formation is noted. There is a femoral  neck. No fractures or destructive lesions are noted.         Impression    IMPRESSION: There are severe arthritic changes in the right hip.  Moderate arthritic changes in the left hip    DIAMOND DENIS MD         SYSTEM ID:  [id number]   Results for orders  placed or performed in visit on 01/03/22   Lipid Panel     Status: None   Result Value Ref Range    Cholesterol 168 <200 mg/dL    Triglycerides 76 <150 mg/dL    Direct Measure HDL 62 23 - 92 mg/dL    LDL Cholesterol Calculated 91 <=100 mg/dL    Non HDL Cholesterol 106 <130 mg/dL    Patient Fasting > 8hrs? Unknown     Narrative    Cholesterol  Desirable:  <200 mg/dL    Triglycerides  Normal:  Less than 150 mg/dL  Borderline High:  150-199 mg/dL  High:  200-499 mg/dL  Very High:  Greater than or equal to 500 mg/dL    Direct Measure HDL  Female:  Greater than or equal to 50 mg/dL   Male:  Greater than or equal to 40 mg/dL    LDL Cholesterol  Desirable:  <100mg/dL  Above Desirable:  100-129 mg/dL   Borderline High:  130-159 mg/dL   High:  160-189 mg/dL   Very High:  >= 190 mg/dL    Non HDL Cholesterol  Desirable:  130 mg/dL  Above Desirable:  130-159 mg/dL  Borderline High:  160-189 mg/dL  High:  190-219 mg/dL  Very High:  Greater than or equal to 220 mg/dL   TSH Reflex GH     Status: Normal   Result Value Ref Range    TSH 1.22 0.40 - 4.00 mU/L   Comprehensive Metabolic Panel     Status: Normal   Result Value Ref Range    Sodium 138 134 - 144 mmol/L    Potassium 4.5 3.5 - 5.1 mmol/L    Chloride 104 98 - 107 mmol/L    Carbon Dioxide (CO2) 27 21 - 31 mmol/L    Anion Gap 7 3 - 14 mmol/L    Urea Nitrogen 16 7 - 25 mg/dL    Creatinine 0.83 0.70 - 1.30 mg/dL    Calcium 9.6 8.6 - 10.3 mg/dL    Glucose 99 70 - 105 mg/dL    Alkaline Phosphatase 75 34 - 104 U/L    AST 15 13 - 39 U/L    ALT 13 7 - 52 U/L    Protein Total 7.2 6.4 - 8.9 g/dL    Albumin 4.2 3.5 - 5.7 g/dL    Bilirubin Total 0.9 0.3 - 1.0 mg/dL    GFR Estimate 90 >60 mL/min/1.73m2        ASSESSMENT / PLAN:       ICD-10-CM    1. Encounter for Medicare annual wellness exam  Z00.00    2. Arthritis of right hip  M16.11 XR Pelvis and Hip Right 1 View     Orthopedic  Referral   3. Hypothyroidism following radioiodine therapy  E89.0 levothyroxine  "(SYNTHROID/LEVOTHROID) 100 MCG tablet     TSH Reflex GH     TSH Reflex GH   4. Elevated coronary artery calcium score  R93.1 atorvastatin (LIPITOR) 80 MG tablet     Comprehensive Metabolic Panel     Comprehensive Metabolic Panel   5. Dyslipidemia  E78.5 Lipid Panel     Lipid Panel   6. Need for prophylactic vaccination and inoculation against influenza  Z23 INFLUENZA, QUAD, HIGH DOSE, PF, 65YR + (FLUZONE HD)       Patient has been advised of split billing requirements and indicates understanding: Yes     Exam wise, he appears to have right hip arthritis.  Sent for right hip x-ray which shows severe arthritis.  Moderate arthritis on the left, but that does not seem as much of an impediment.  He is not inclined to pursue surgery at this time, but that certainly would be reasonable given appearance of his hip on x-ray.  Reviewed options.  NSAIDs can interfere with aspirin and slightly increased risk for heart ischemia.  Recommend use of acetaminophen and limited use of any NSAID. If pain is worse, consider steroid injection or hip replacement.    TSH normal.  Refilled same levothyroxine dose    Lipids with LDL below 100.  Continue maximum dose atorvastatin.    COUNSELING:  Regular exercise       Healthy diet/nutrition       Vision screening       Hearing screening       Dental care       Bladder control       Fall risk prevention       Immunizations    Flu shot today    Shingrix from pharmacy       Aspirin Prophylaxis - yes for high calcium score       Colon cancer screening - normal 2021    Estimated body mass index is 34.93 kg/m  as calculated from the following:    Height as of this encounter: 1.702 m (5' 7\").    Weight as of this encounter: 101.2 kg (223 lb).    Weight management plan: Discussed healthy diet and exercise guidelines    He reports that he has never smoked. He has never used smokeless tobacco.    Appropriate preventive services were discussed with this patient, including applicable screening as " appropriate for cardiovascular disease, diabetes, osteopenia/osteoporosis, and glaucoma.  As appropriate for age/gender, discussed screening for colorectal cancer, prostate cancer, breast cancer, and cervical cancer. Checklist reviewing preventive services available has been given to the patient.    Reviewed patients plan of care and provided an AVS. The Basic Care Plan (routine screening as documented in Health Maintenance) for Justino meets the Care Plan requirement. This Care Plan has been established and reviewed with the Patient.    Counseling Resources:  ATP IV Guidelines  Pooled Cohorts Equation Calculator  Breast Cancer Risk Calculator  BRCA-Related Cancer Risk Assessment: FHS-7 Tool  FRAX Risk Assessment  ICSI Preventive Guidelines  Dietary Guidelines for Americans, 2010  USDA's MyPlate  ASA Prophylaxis  Lung CA Screening    Fermín Mobley MD  [facility] AND [facility]

## 2022-01-04 ASSESSMENT — PATIENT HEALTH QUESTIONNAIRE - PHQ9: SUM OF ALL RESPONSES TO PHQ QUESTIONS 1-9: 0

## 2022-01-04 ASSESSMENT — ANXIETY QUESTIONNAIRES: GAD7 TOTAL SCORE: 0

## 2022-03-08 ENCOUNTER — OFFICE VISIT (OUTPATIENT)
Dept: ORTHOPEDICS | Facility: OTHER | Age: 77
End: 2022-03-08
Attending: FAMILY MEDICINE
Payer: MEDICARE

## 2022-03-08 VITALS
HEART RATE: 57 BPM | BODY MASS INDEX: 35.08 KG/M2 | OXYGEN SATURATION: 97 % | SYSTOLIC BLOOD PRESSURE: 138 MMHG | DIASTOLIC BLOOD PRESSURE: 64 MMHG | WEIGHT: 224 LBS

## 2022-03-08 DIAGNOSIS — M16.11 ARTHRITIS OF RIGHT HIP: ICD-10-CM

## 2022-03-08 PROCEDURE — 99214 OFFICE O/P EST MOD 30 MIN: CPT | Performed by: ORTHOPAEDIC SURGERY

## 2022-03-08 PROCEDURE — G0463 HOSPITAL OUTPT CLINIC VISIT: HCPCS

## 2022-03-08 ASSESSMENT — PAIN SCALES - GENERAL: PAINLEVEL: MILD PAIN (2)

## 2022-03-08 NOTE — PROGRESS NOTES
Patient is here for consult on his right hip pain.  Keesha Lara LPN .....................3/8/2022 9:56 AM

## 2022-03-25 ENCOUNTER — HOSPITAL ENCOUNTER (OUTPATIENT)
Facility: OTHER | Age: 77
End: 2022-03-25
Attending: ORTHOPAEDIC SURGERY | Admitting: ORTHOPAEDIC SURGERY
Payer: MEDICARE

## 2022-06-13 ENCOUNTER — TELEPHONE (OUTPATIENT)
Dept: ORTHOPEDICS | Facility: OTHER | Age: 77
End: 2022-06-13

## 2022-06-13 DIAGNOSIS — M16.11 ARTHRITIS OF RIGHT HIP: ICD-10-CM

## 2022-06-13 DIAGNOSIS — Z96.641 S/P TOTAL RIGHT HIP ARTHROPLASTY: Primary | ICD-10-CM

## 2022-06-13 NOTE — TELEPHONE ENCOUNTER
Right Total Hip Arthroplasty, Direct Anterior Approach  DOS 11/15/22  Needs OP PT to start 5-7 days post-surgery.

## 2022-06-14 DIAGNOSIS — Z96.641 S/P TOTAL RIGHT HIP ARTHROPLASTY: Primary | ICD-10-CM

## 2023-01-08 ASSESSMENT — ENCOUNTER SYMPTOMS
NERVOUS/ANXIOUS: 0
EYE PAIN: 0
MYALGIAS: 0
SHORTNESS OF BREATH: 0
CONSTIPATION: 0
HEARTBURN: 0
ARTHRALGIAS: 1
FEVER: 0
DIZZINESS: 1
HEMATURIA: 0
NAUSEA: 0
JOINT SWELLING: 0
DIARRHEA: 0
PALPITATIONS: 0
PARESTHESIAS: 0
DYSURIA: 0
HEMATOCHEZIA: 0
FREQUENCY: 1
ABDOMINAL PAIN: 0
SORE THROAT: 0
CHILLS: 0
COUGH: 0
HEADACHES: 0
WEAKNESS: 0

## 2023-01-08 ASSESSMENT — ANXIETY QUESTIONNAIRES
7. FEELING AFRAID AS IF SOMETHING AWFUL MIGHT HAPPEN: NOT AT ALL
4. TROUBLE RELAXING: NOT AT ALL
5. BEING SO RESTLESS THAT IT IS HARD TO SIT STILL: NOT AT ALL
GAD7 TOTAL SCORE: 0
GAD7 TOTAL SCORE: 0
3. WORRYING TOO MUCH ABOUT DIFFERENT THINGS: NOT AT ALL
1. FEELING NERVOUS, ANXIOUS, OR ON EDGE: NOT AT ALL
7. FEELING AFRAID AS IF SOMETHING AWFUL MIGHT HAPPEN: NOT AT ALL
2. NOT BEING ABLE TO STOP OR CONTROL WORRYING: NOT AT ALL
6. BECOMING EASILY ANNOYED OR IRRITABLE: NOT AT ALL
GAD7 TOTAL SCORE: 0

## 2023-01-08 ASSESSMENT — PATIENT HEALTH QUESTIONNAIRE - PHQ9
10. IF YOU CHECKED OFF ANY PROBLEMS, HOW DIFFICULT HAVE THESE PROBLEMS MADE IT FOR YOU TO DO YOUR WORK, TAKE CARE OF THINGS AT HOME, OR GET ALONG WITH OTHER PEOPLE: NOT DIFFICULT AT ALL
SUM OF ALL RESPONSES TO PHQ QUESTIONS 1-9: 1
SUM OF ALL RESPONSES TO PHQ QUESTIONS 1-9: 1

## 2023-01-08 ASSESSMENT — ACTIVITIES OF DAILY LIVING (ADL): CURRENT_FUNCTION: NO ASSISTANCE NEEDED

## 2023-01-09 ENCOUNTER — OFFICE VISIT (OUTPATIENT)
Dept: FAMILY MEDICINE | Facility: OTHER | Age: 78
End: 2023-01-09
Attending: FAMILY MEDICINE
Payer: MEDICARE

## 2023-01-09 VITALS
TEMPERATURE: 97.9 F | BODY MASS INDEX: 35.16 KG/M2 | WEIGHT: 224 LBS | RESPIRATION RATE: 20 BRPM | HEIGHT: 67 IN | OXYGEN SATURATION: 97 % | DIASTOLIC BLOOD PRESSURE: 80 MMHG | HEART RATE: 76 BPM | SYSTOLIC BLOOD PRESSURE: 138 MMHG

## 2023-01-09 DIAGNOSIS — L98.9 SKIN LESION: ICD-10-CM

## 2023-01-09 DIAGNOSIS — Z23 NEED FOR SHINGLES VACCINE: ICD-10-CM

## 2023-01-09 DIAGNOSIS — E89.0 HYPOTHYROIDISM FOLLOWING RADIOIODINE THERAPY: ICD-10-CM

## 2023-01-09 DIAGNOSIS — Z00.00 ENCOUNTER FOR MEDICARE ANNUAL WELLNESS EXAM: Primary | ICD-10-CM

## 2023-01-09 DIAGNOSIS — Z23 NEED FOR TDAP VACCINATION: ICD-10-CM

## 2023-01-09 DIAGNOSIS — R39.15 URINARY URGENCY: ICD-10-CM

## 2023-01-09 DIAGNOSIS — E78.5 DYSLIPIDEMIA: ICD-10-CM

## 2023-01-09 DIAGNOSIS — R93.1 ELEVATED CORONARY ARTERY CALCIUM SCORE: ICD-10-CM

## 2023-01-09 LAB
ALBUMIN SERPL BCG-MCNC: 4.2 G/DL (ref 3.5–5.2)
ALP SERPL-CCNC: 98 U/L (ref 40–129)
ALT SERPL W P-5'-P-CCNC: 20 U/L (ref 10–50)
ANION GAP SERPL CALCULATED.3IONS-SCNC: 8 MMOL/L (ref 7–15)
AST SERPL W P-5'-P-CCNC: 21 U/L (ref 10–50)
BILIRUB SERPL-MCNC: 0.8 MG/DL
BUN SERPL-MCNC: 12.8 MG/DL (ref 8–23)
CALCIUM SERPL-MCNC: 9.1 MG/DL (ref 8.8–10.2)
CHLORIDE SERPL-SCNC: 105 MMOL/L (ref 98–107)
CHOLEST SERPL-MCNC: 161 MG/DL
CREAT SERPL-MCNC: 0.74 MG/DL (ref 0.67–1.17)
DEPRECATED HCO3 PLAS-SCNC: 27 MMOL/L (ref 22–29)
GFR SERPL CREATININE-BSD FRML MDRD: >90 ML/MIN/1.73M2
GLUCOSE SERPL-MCNC: 105 MG/DL (ref 70–99)
HDLC SERPL-MCNC: 73 MG/DL
LDLC SERPL CALC-MCNC: 77 MG/DL
NONHDLC SERPL-MCNC: 88 MG/DL
POTASSIUM SERPL-SCNC: 4.3 MMOL/L (ref 3.4–5.3)
PROT SERPL-MCNC: 7.2 G/DL (ref 6.4–8.3)
SODIUM SERPL-SCNC: 140 MMOL/L (ref 136–145)
TRIGL SERPL-MCNC: 57 MG/DL
TSH SERPL DL<=0.005 MIU/L-ACNC: 0.93 UIU/ML (ref 0.3–4.2)

## 2023-01-09 PROCEDURE — 84443 ASSAY THYROID STIM HORMONE: CPT | Mod: ZL | Performed by: FAMILY MEDICINE

## 2023-01-09 PROCEDURE — G0439 PPPS, SUBSEQ VISIT: HCPCS | Performed by: FAMILY MEDICINE

## 2023-01-09 PROCEDURE — G0463 HOSPITAL OUTPT CLINIC VISIT: HCPCS

## 2023-01-09 PROCEDURE — 80053 COMPREHEN METABOLIC PANEL: CPT | Mod: ZL | Performed by: FAMILY MEDICINE

## 2023-01-09 PROCEDURE — 36415 COLL VENOUS BLD VENIPUNCTURE: CPT | Mod: ZL | Performed by: FAMILY MEDICINE

## 2023-01-09 PROCEDURE — 80061 LIPID PANEL: CPT | Mod: ZL | Performed by: FAMILY MEDICINE

## 2023-01-09 PROCEDURE — 99213 OFFICE O/P EST LOW 20 MIN: CPT | Mod: 25 | Performed by: FAMILY MEDICINE

## 2023-01-09 RX ORDER — LEVOTHYROXINE SODIUM 100 UG/1
100 TABLET ORAL
Qty: 90 TABLET | Refills: 4 | Status: SHIPPED | OUTPATIENT
Start: 2023-01-09 | End: 2024-01-18

## 2023-01-09 RX ORDER — CLOSTRIDIUM TETANI TOXOID ANTIGEN (FORMALDEHYDE INACTIVATED), CORYNEBACTERIUM DIPHTHERIAE TOXOID ANTIGEN (FORMALDEHYDE INACTIVATED), BORDETELLA PERTUSSIS TOXOID ANTIGEN (GLUTARALDEHYDE INACTIVATED), BORDETELLA PERTUSSIS FILAMENTOUS HEMAGGLUTININ ANTIGEN (FORMALDEHYDE INACTIVATED), BORDETELLA PERTUSSIS PERTACTIN ANTIGEN, AND BORDETELLA PERTUSSIS FIMBRIAE 2/3 ANTIGEN 5; 2; 2.5; 5; 3; 5 [LF]/.5ML; [LF]/.5ML; UG/.5ML; UG/.5ML; UG/.5ML; UG/.5ML
0.5 INJECTION, SUSPENSION INTRAMUSCULAR ONCE
Qty: 0.5 ML | Refills: 0 | Status: SHIPPED | OUTPATIENT
Start: 2023-01-09 | End: 2023-01-09

## 2023-01-09 RX ORDER — ATORVASTATIN CALCIUM 80 MG/1
80 TABLET, FILM COATED ORAL
Qty: 90 TABLET | Refills: 4 | Status: SHIPPED | OUTPATIENT
Start: 2023-01-09 | End: 2024-01-18

## 2023-01-09 RX ORDER — ZOSTER VACCINE RECOMBINANT, ADJUVANTED 50 MCG/0.5
1 KIT INTRAMUSCULAR ONCE
Qty: 0.5 ML | Refills: 0 | Status: SHIPPED | OUTPATIENT
Start: 2023-01-09 | End: 2023-01-09

## 2023-01-09 ASSESSMENT — ENCOUNTER SYMPTOMS
DIARRHEA: 0
ABDOMINAL PAIN: 0
FREQUENCY: 1
HEMATOCHEZIA: 0
CONSTIPATION: 0
HEADACHES: 0
HEARTBURN: 0
ARTHRALGIAS: 1
NERVOUS/ANXIOUS: 0
SORE THROAT: 0
WEAKNESS: 0
FEVER: 0
PARESTHESIAS: 0
MYALGIAS: 0
JOINT SWELLING: 0
HEMATURIA: 0
NAUSEA: 0
DYSURIA: 0
DIZZINESS: 1
CHILLS: 0
COUGH: 0
EYE PAIN: 0
PALPITATIONS: 0
SHORTNESS OF BREATH: 0

## 2023-01-09 ASSESSMENT — PAIN SCALES - GENERAL: PAINLEVEL: MODERATE PAIN (4)

## 2023-01-09 ASSESSMENT — ACTIVITIES OF DAILY LIVING (ADL): CURRENT_FUNCTION: NO ASSISTANCE NEEDED

## 2023-01-09 NOTE — PATIENT INSTRUCTIONS
Referral to general surgery for skin lesion removal    Reduce Aleve (naproxen)  Tylenol (acetaminophen) is safe  Tetanus booster and Shingrix from a pharmacy    Patient Education   Personalized Prevention Plan  You are due for the preventive services outlined below.  Your care team is available to assist you in scheduling these services.  If you have already completed any of these items, please share that information with your care team to update in your medical record.  Health Maintenance Due   Topic Date Due    Hepatitis C Screening  Never done    Zoster (Shingles) Vaccine (2 of 3) 12/19/2012    Flu Vaccine (1) 09/01/2022    Diptheria Tetanus Pertussis (DTAP/TDAP/TD) Vaccine (4 - Td or Tdap) 10/24/2022    Annual Wellness Visit  01/03/2023       Understanding USDA MyPlate  The USDA has guidelines to help you make healthy food choices. These are called MyPlate. MyPlate shows the food groups that make up healthy meals using the image of a place setting. Before you eat, think about the healthiest choices for what to put on your plate or in your cup or bowl. To learn more about building a healthy plate, visit www.choosemyplate.gov.    The food groups  Fruits. Any fruit or 100% fruit juice counts as part of the Fruit Group. Fruits may be fresh, canned, frozen, or dried, and may be whole, cut-up, or pureed. Make 1/2 of your plate fruits and vegetables.  Vegetables. Any vegetable or 100% vegetable juice counts as a member of the Vegetable Group. Vegetables may be fresh, frozen, canned, or dried. They can be served raw or cooked and may be whole, cut-up, or mashed. Make 1/2 of your plate fruits and vegetables.  Grains. All foods made from grains are part of the Grains Group. These include wheat, rice, oats, cornmeal, and barley. Grains are often used to make foods such as bread, pasta, oatmeal, cereal, tortillas, and grits. Grains should be no more than 1/4 of your plate. At least half of your grains should be whole  grains.  Protein. This group includes meat, poultry, seafood, beans and peas, eggs, processed soy products (such as tofu), nuts (including nut butters), and seeds. Make protein choices no more than 1/4 of your plate. Meat and poultry choices should be lean or low fat.  Dairy. The Dairy Group includes all fluid milk products and foods made from milk that contain calcium, such as yogurt and cheese. (Foods that have little calcium, such as cream, butter, and cream cheese, are not part of this group.) Most dairy choices should be low-fat or fat-free.  Oils. Oils aren't a food group, but they do contain essential nutrients. However it's important to watch your intake of oils. These are fats that are liquid at room temperature. They include canola, corn, olive, soybean, vegetable, and sunflower oil. Foods that are mainly oil include mayonnaise, certain salad dressings, and soft margarines. You likely already get your daily oil allowance from the foods you eat.  Things to limit  Eating healthy also means limiting these things in your diet:     Salt (sodium). Many processed foods have a lot of sodium. To keep sodium intake down, eat fresh vegetables, meats, poultry, and seafood when possible. Purchase low-sodium, reduced-sodium, or no-salt-added food products at the store. And don't add salt to your meals at home. Instead, season them with herbs and spices such as dill, oregano, cumin, and paprika. Or try adding flavor with lemon or lime zest and juice.  Saturated fat. Saturated fats are most often found in animal products such as beef, pork, and chicken. They are often solid at room temperature, such as butter. To reduce your saturated fat intake, choose leaner cuts of meat and poultry. And try healthier cooking methods such as grilling, broiling, roasting, or baking. For a simple lower-fat swap, use plain nonfat yogurt instead of mayonnaise when making potato salad or macaroni salad.  Added sugars. These are sugars added  to foods. They are in foods such as ice cream, candy, soda, fruit drinks, sports drinks, energy drinks, cookies, pastries, jams, and syrups. Cut down on added sugars by sharing sweet treats with a family member or friend. You can also choose fruit for dessert, and drink water or other unsweetened beverages.     hulu last reviewed this educational content on 6/1/2020 2000-2021 [employer]. All rights reserved. This information is not intended as a substitute for professional medical care. Always follow your healthcare professional's instructions.          Signs of Hearing Loss      Hearing much better with one ear can be a sign of hearing loss.   Hearing loss is a problem shared by many people. In fact, it is one of the most common health problems, particularly as people age. Most people age 65 and older have some hearing loss. By age 80, almost everyone does. Hearing loss often occurs slowly over the years. So you may not realize your hearing has gotten worse.  Have your hearing checked  Call your healthcare provider if you:  Have to strain to hear normal conversation  Have to watch other people s faces very carefully to follow what they re saying  Need to ask people to repeat what they ve said  Often misunderstand what people are saying  Turn the volume of the television or radio up so high that others complain  Feel that people are mumbling when they re talking to you  Find that the effort to hear leaves you feeling tired and irritated  Notice, when using the phone, that you hear better with one ear than the other  hulu last reviewed this educational content on 1/1/2020 2000-2021 [employer]. All rights reserved. This information is not intended as a substitute for professional medical care. Always follow your healthcare professional's instructions.          Urinary Incontinence (Male)    Urinary incontinence means not being able to control the release of urine from the  bladder.   Causes  Common causes of urinary incontinence in men include:  Infection  Certain medicines  Aging  Poor pelvic muscle tone  Bladder spasms  Obesity  Trouble urinating and fully emptying the bladder (urinary retention)  Other things that can cause incontinence are:   Nervous system diseases  Diabetes  Sleep apnea  Urinary tract infections  Prostate surgery  Pelvic injury  Constipation and smoking have also been identified as risk factors.   Symptoms  Urge incontinence (overactive bladder). This is a sudden urge to urinate. It occurs even though there may not be much urine in the bladder. The need to urinate often during the night is common. It's due to bladder spasms.  Stress incontinence. This is urine leakage that you can't control. It can occur with sneezing, coughing, and other actions that put stress on the bladder.    Treatment  Treatment depends on what is causing the condition. Bladder infections are treated with antibiotics. Urinary retention is treated with a bladder catheter.   Home care  Follow these guidelines when caring for yourself at home:  Don't have any foods and drinks that may irritate the bladder. This includes:  Chocolate  Alcohol  Caffeine  Carbonated drinks  Acidic fruits and juices  Limit fluids to 6 to 8 cups a day.  Lose weight if you are overweight. This will reduce your symptoms.  If advised, do regular pelvic muscle-strengthening exercises such as Kegel exercises.  If needed, wear absorbent pads to catch urine. Change the pads often. This is for good hygiene and to prevent skin and bladder infections.  Bathe daily for good hygiene.  If an antibiotic was prescribed to treat a bladder infection, take it until it's finished. Keep taking it even if you are feeling better. This is to make sure your infection has cleared.  If a catheter was left in place, keep bacteria from getting into the collection bag. Don't disconnect the catheter from the collection bag.  Use a leg band to  secure the catheter drainage tube, so it does not pull on the catheter. Drain the collection bag when it becomes full. To do this, use the drain spout at the bottom of the bag. Don't disconnect the bag from the catheter.  Don't pull on or try to remove a catheter. The catheter must be removed by a healthcare provider.  If you smoke, stop. Ask your provider for help if you can't do this on your own.  Follow-up care  Follow up with your healthcare provider, or as advised.  When to get medical advice  Call your healthcare provider right away if any of these occur:  Fever over 100.4 F (38 C), or as directed by your provider  Bladder pain or fullness  Belly swelling, nausea, or vomiting  Back pain  Weakness, dizziness, or fainting  If a catheter was left in place, return if:  The catheter falls out  The catheter stops draining for 6 hours  Your urine gets cloudy or smells bad  Pati last reviewed this educational content on 1/1/2020 2000-2021 [employer]. All rights reserved. This information is not intended as a substitute for professional medical care. Always follow your healthcare professional's instructions.

## 2023-01-09 NOTE — PROGRESS NOTES
"SUBJECTIVE:   Alvaro is a 77 year old who presents for Preventive Visit.    Patient has been advised of split billing requirements and indicates understanding: Yes  Are you in the first 12 months of your Medicare coverage?  No    Healthy Habits:     In general, how would you rate your overall health?  Excellent    Frequency of exercise:  2-3 days/week    Duration of exercise:  15-30 minutes    Do you usually eat at least 4 servings of fruit and vegetables a day, include whole grains    & fiber and avoid regularly eating high fat or \"junk\" foods?  No    Taking medications regularly:  Yes    Medication side effects:  None    Ability to successfully perform activities of daily living:  No assistance needed    Home Safety:  Throw rugs in the hallway    Hearing Impairment:  Difficulty following a conversation in a noisy restaurant or crowded room, need to ask people to speak up or repeat themselves, find that men's voices are easier to understand than woman's and difficulty understanding soft or whispered speech    In the past 6 months, have you been bothered by leaking of urine? Yes    In general, how would you rate your overall mental or emotional health?  Excellent      PHQ-2 Total Score: 0    Additional concerns today:  No   Answers for HPI/ROS submitted by the patient on 1/8/2023  If you checked off any problems, how difficult have these problems made it for you to do your work, take care of things at home, or get along with other people?: Not difficult at all  PHQ9 TOTAL SCORE: 1  AIDA 7 TOTAL SCORE: 0    Review current opioid prescriptions    For a patient with a current opioid prescription: no prescription    Reviewed potential Opioid Use Disorder (OUD) risk factors: None    Screen for potential Substance Use Disorders (SUDs)    Reviewed the patient s potential risk factors for SUDs: None       Have you ever done Advance Care Planning? (For example, a Health Directive, POLST, or a discussion with a medical provider " or your loved ones about your wishes): Yes, advance care planning is on file.       Fall risk  Fallen 2 or more times in the past year?: No  Any fall with injury in the past year?: No    Cognitive Screening   1) Repeat 3 items (Leader, Season, Table)    2) Clock draw: NORMAL  3) 3 item recall: Recalls 3 objects  Results: 3 items recalled: COGNITIVE IMPAIRMENT LESS LIKELY    Mini-CogTM Copyright JONNATHAN Soliman. Licensed by the author for use in [facility]; reprinted with permission (thomas@[facility]). All rights reserved.      Do you have sleep apnea, excessive snoring or daytime drowsiness?: no     On atorvastatin 80 mg daily and aspirin for calcium score of over 3000 and 2017.  Has not had any cardiac symptoms and has not undergone stress testing.     History of thyroid ablation on a stable dose of levothyroxine.  Takes it consistently in the morning.    Reviewed and updated as needed this visit by clinical staff   Tobacco  Allergies  Meds  Problems  Med Hx  Surg Hx  Fam Hx  Soc   Hx        Reviewed and updated as needed this visit by Provider   Tobacco  Allergies  Meds  Problems  Med Hx  Surg Hx  Fam Hx         Social History     Tobacco Use     Smoking status: Never     Smokeless tobacco: Never   Substance Use Topics     Alcohol use: Yes     Alcohol/week: 12.5 standard drinks     Comment: 1 shot and 1 glass of wine daily.         Alcohol Use 1/8/2023   Prescreen: >3 drinks/day or >7 drinks/week? Yes               Current providers sharing in care for this patient include:   Patient Care Team:  Fermín Mobley MD as PCP - General (Family Practice)  Fermín Mobley MD as Assigned PCP  Terry Reich MD as Assigned Musculoskeletal Provider    The following health maintenance items are reviewed in Epic and correct as of today:  Health Maintenance   Topic Date Due     HEPATITIS C SCREENING  Never done     ZOSTER IMMUNIZATION (2 of 3) 12/19/2012     INFLUENZA VACCINE (1) 09/01/2022      DTAP/TDAP/TD IMMUNIZATION (4 - Td or Tdap) 10/24/2022     MEDICARE ANNUAL WELLNESS VISIT  2023     FALL RISK ASSESSMENT  2024     COLORECTAL CANCER SCREENING  2026     LIPID  2027     ADVANCE CARE PLANNING  2028     PHQ-2 (once per calendar year)  Completed     Pneumococcal Vaccine: 65+ Years  Completed     COVID-19 Vaccine  Completed     IPV IMMUNIZATION  Aged Out     MENINGITIS IMMUNIZATION  Aged Out     Patient Active Problem List   Diagnosis     Actinic keratosis of scalp     Elevated coronary artery calcium score     Graves' disease     Hypothyroidism following radioiodine therapy     Seborrheic keratoses     Tubular adenoma of colon     ACP (advance care planning)     1st degree AV block     Past Surgical History:   Procedure Laterality Date     C THYROID ABLATION       CLOSED RX ELBOW DISLOCATION  2018    Dr Reich     COLONOSCOPY  2016    Dr Rosa     COLONOSCOPY  2021    normal     VASECTOMY      No Comments Provided       Social History     Tobacco Use     Smoking status: Never     Smokeless tobacco: Never   Substance Use Topics     Alcohol use: Yes     Alcohol/week: 12.5 standard drinks     Comment: 1 shot and 1 glass of wine daily.     Family History   Problem Relation Age of Onset     Other - See Comments Mother         natural  92     Diabetes Mother         Diabetes     Other - See Comments Father         pancreatic cancer  68     Heart Disease Father 64        Heart Disease,MI age 64         Current Outpatient Medications   Medication Sig Dispense Refill     aspirin EC 81 MG EC tablet Take 81 mg by mouth daily with food Take 1 tablet by mouth once daily with a meal.       atorvastatin (LIPITOR) 80 MG tablet Take 1 tablet (80 mg) by mouth daily (with dinner) 90 tablet 4     levothyroxine (SYNTHROID/LEVOTHROID) 100 MCG tablet Take 1 tablet (100 mcg) by mouth every morning (before breakfast) Take 1 tablet by mouth before breakfast. 90 tablet 4      "naproxen sodium (ALEVE) 220 MG capsule        No Known Allergies    Review of Systems   Constitutional: Negative for chills and fever.   HENT: Negative for congestion, ear pain, hearing loss and sore throat.    Eyes: Negative for pain and visual disturbance.   Respiratory: Negative for cough and shortness of breath.    Cardiovascular: Negative for chest pain, palpitations and peripheral edema.   Gastrointestinal: Negative for abdominal pain, constipation, diarrhea, heartburn, hematochezia and nausea.   Genitourinary: Positive for frequency and urgency. Negative for dysuria, genital sores, hematuria, impotence and penile discharge.   Musculoskeletal: Positive for arthralgias. Negative for joint swelling and myalgias.   Skin: Negative for rash.   Neurological: Positive for dizziness. Negative for weakness, headaches and paresthesias.   Psychiatric/Behavioral: Negative for mood changes. The patient is not nervous/anxious.        OBJECTIVE:   /80 (BP Location: Right arm, Patient Position: Sitting, Cuff Size: Adult Large)   Pulse 76   Temp 97.9  F (36.6  C) (Tympanic)   Resp 20   Ht 1.695 m (5' 6.75\")   Wt 101.6 kg (224 lb)   SpO2 97%   BMI 35.35 kg/m   Estimated body mass index is 35.35 kg/m  as calculated from the following:    Height as of this encounter: 1.695 m (5' 6.75\").    Weight as of this encounter: 101.6 kg (224 lb).  Physical Exam  General Appearance: Pleasant, alert, appropriate appearance for age. No acute distress  Eye Exam:  Normal external eye, conjunctiva, lids, cornea. ALEJANDRA.  Ear Exam: Normal TM's bilaterally. Normal auditory canals and external ears.  OroPharynx Exam:  Dental hygiene adequate. Normal buccal mucosa. Normal pharynx.  Neck Exam:  Supple, no masses or nodes.  Thyroid Exam: No nodules or enlargement.  Chest/Respiratory Exam: Normal chest wall and respirations. Clear to auscultation.  Cardiovascular Exam: Regular rate and rhythm. S1, S2, no murmur, click, gallop, or " rubs.  Gastrointestinal Exam: Soft, non-tender, no masses or organomegaly.  Musculoskeletal Exam: Back is straight and non-tender, full ROM of upper and lower extremities.  Foot Exam: Left and right foot: good pedal pulses.  Skin: large black lesion 5 x 11 mm on left lower back, different than surrounding SKs and smooth. Smaller 6 mm likely SK on left chest wall  Neurologic Exam: Nonfocal, symmetric DTRs, normal gross motor, tone coordination and no tremor.  Psychiatric Exam: Alert and oriented - appropriate affect.              Results for orders placed or performed in visit on 01/09/23   TSH Reflex GH     Status: Normal   Result Value Ref Range    TSH 0.93 0.30 - 4.20 uIU/mL   Lipid Panel     Status: Normal   Result Value Ref Range    Cholesterol 161 <200 mg/dL    Triglycerides 57 <150 mg/dL    Direct Measure HDL 73 >=40 mg/dL    LDL Cholesterol Calculated 77 <=100 mg/dL    Non HDL Cholesterol 88 <130 mg/dL    Narrative    Cholesterol  Desirable:  <200 mg/dL    Triglycerides  Normal:  Less than 150 mg/dL  Borderline High:  150-199 mg/dL  High:  200-499 mg/dL  Very High:  Greater than or equal to 500 mg/dL    Direct Measure HDL  Female:  Greater than or equal to 50 mg/dL   Male:  Greater than or equal to 40 mg/dL    LDL Cholesterol  Desirable:  <100mg/dL  Above Desirable:  100-129 mg/dL   Borderline High:  130-159 mg/dL   High:  160-189 mg/dL   Very High:  >= 190 mg/dL    Non HDL Cholesterol  Desirable:  130 mg/dL  Above Desirable:  130-159 mg/dL  Borderline High:  160-189 mg/dL  High:  190-219 mg/dL  Very High:  Greater than or equal to 220 mg/dL   Comprehensive Metabolic Panel     Status: Abnormal   Result Value Ref Range    Sodium 140 136 - 145 mmol/L    Potassium 4.3 3.4 - 5.3 mmol/L    Chloride 105 98 - 107 mmol/L    Carbon Dioxide (CO2) 27 22 - 29 mmol/L    Anion Gap 8 7 - 15 mmol/L    Urea Nitrogen 12.8 8.0 - 23.0 mg/dL    Creatinine 0.74 0.67 - 1.17 mg/dL    Calcium 9.1 8.8 - 10.2 mg/dL    Glucose 105 (H)  70 - 99 mg/dL    Alkaline Phosphatase 98 40 - 129 U/L    AST 21 10 - 50 U/L    ALT 20 10 - 50 U/L    Protein Total 7.2 6.4 - 8.3 g/dL    Albumin 4.2 3.5 - 5.2 g/dL    Bilirubin Total 0.8 <=1.2 mg/dL    GFR Estimate >90 >60 mL/min/1.73m2        ASSESSMENT / PLAN:       ICD-10-CM    1. Encounter for Medicare annual wellness exam  Z00.00       2. Skin lesion  L98.9 Adult General Surg Referral      3. Hypothyroidism following radioiodine therapy  E89.0 levothyroxine (SYNTHROID/LEVOTHROID) 100 MCG tablet     TSH Reflex GH     TSH Reflex GH      4. Elevated coronary artery calcium score  R93.1 atorvastatin (LIPITOR) 80 MG tablet     Comprehensive Metabolic Panel     Comprehensive Metabolic Panel      5. Dyslipidemia  E78.5 Lipid Panel     Lipid Panel      6. Need for Tdap vaccination  Z23 Tdap, tetanus-diphtheria-acell pertussis, (ADACEL) 5-2-15.5 LF-MCG/0.5 injection      7. Need for shingles vaccine  Z23 zoster vaccine recombinant adjuvanted (SHINGRIX) injection            Patient has been advised of split billing requirements and indicates understanding: Yes    TSH normal.  Refilled same levothyroxine dose     Lipids with LDL below 100.  Continue maximum dose atorvastatin.     Notes occasional urinary urgency.  Nocturia x3.  He does drink 16 ounces of water before bed.  We discussed it can take 2 hours to circulate fluids and he should cut back on liquids 2 hours before bed.  Can elevate legs to reduce edema in the evening, which would result in less nocturia.  Has a follow-up every other day.  We discussed the relationship between constipation and urinary hesitancy.  If he makes lifestyle changes and still is bothered by urination, consider tamsulosin.    COUNSELING:  Reviewed preventive health counseling, as reflected in patient instructions       Regular exercise       Healthy diet/nutrition       Vision screening       Hearing screening       Bladder control       Immunizations    Had COVID bivalent booster and flu  "vaccine from [facility], but not documented in [facility]    Ordered Tdap and Shingrix from the pharmacy        Aspirin Prophylaxis - yes for high calcium score      Colon cancer screening - normal 2021; previous abnormals, consider repeat 2026      BMI:   Estimated body mass index is 35.35 kg/m  as calculated from the following:    Height as of this encounter: 1.695 m (5' 6.75\").    Weight as of this encounter: 101.6 kg (224 lb).   Weight management plan: Discussed healthy diet and exercise guidelines      He reports that he has never smoked. He has never used smokeless tobacco.      Appropriate preventive services were discussed with this patient, including applicable screening as appropriate for cardiovascular disease, diabetes, osteopenia/osteoporosis, and glaucoma.  As appropriate for age/gender, discussed screening for colorectal cancer, prostate cancer, breast cancer, and cervical cancer. Checklist reviewing preventive services available has been given to the patient.    Reviewed patients plan of care and provided an AVS. The Basic Care Plan (routine screening as documented in Health Maintenance) for Justino meets the Care Plan requirement. This Care Plan has been established and reviewed with the Patient.          Fermín Mobley MD  [facility]    Identified Health Risks:    The patient was counseled and encouraged to consider modifying their diet and eating habits. He was provided with information on recommended healthy diet options.  The patient was provided with written information regarding signs of hearing loss.  Information on urinary incontinence and treatment options given to patient.  "

## 2023-01-30 ENCOUNTER — OFFICE VISIT (OUTPATIENT)
Dept: SURGERY | Facility: OTHER | Age: 78
End: 2023-01-30
Attending: SURGERY
Payer: MEDICARE

## 2023-01-30 VITALS
RESPIRATION RATE: 18 BRPM | DIASTOLIC BLOOD PRESSURE: 72 MMHG | SYSTOLIC BLOOD PRESSURE: 130 MMHG | BODY MASS INDEX: 35.47 KG/M2 | OXYGEN SATURATION: 99 % | HEART RATE: 62 BPM | WEIGHT: 224.8 LBS | TEMPERATURE: 97.1 F

## 2023-01-30 DIAGNOSIS — L98.9 SKIN LESION: Primary | ICD-10-CM

## 2023-01-30 PROCEDURE — G0463 HOSPITAL OUTPT CLINIC VISIT: HCPCS

## 2023-01-30 PROCEDURE — 88305 TISSUE EXAM BY PATHOLOGIST: CPT

## 2023-01-30 PROCEDURE — 12032 INTMD RPR S/A/T/EXT 2.6-7.5: CPT | Performed by: SURGERY

## 2023-01-30 PROCEDURE — 11603 EXC TR-EXT MAL+MARG 2.1-3 CM: CPT | Performed by: SURGERY

## 2023-01-30 ASSESSMENT — PAIN SCALES - GENERAL: PAINLEVEL: MILD PAIN (3)

## 2023-01-30 NOTE — NURSING NOTE
"Chief Complaint   Patient presents with     Derm Problem     Lesion on left lower back.       Initial /72 (BP Location: Left arm, Patient Position: Sitting, Cuff Size: Adult Large)   Pulse 62   Temp 97.1  F (36.2  C) (Tympanic)   Resp 18   Wt 102 kg (224 lb 12.8 oz)   SpO2 99%   BMI 35.47 kg/m   Estimated body mass index is 35.47 kg/m  as calculated from the following:    Height as of 1/9/23: 1.695 m (5' 6.75\").    Weight as of this encounter: 102 kg (224 lb 12.8 oz).  Medication Reconciliation: complete    Shweta Portillo LPN     TIMEOUT  [facility] Protocol    A. Pre-procedure verification complete               1-relevant information / documentation available, reviewed and properly matched to the patient; 2-consent accurate and complete, 3-equipment and supplies available    B. Site marking complete     Site marked if not in continuous attendance with patient    C. TIME OUT completed     Time Out was conducted just prior to starting procedure to verify the eight required elements: 1-patient identity, 2-consent accurate and complete, 3-position, 4-correct side/site marked (if applicable), 5-procedure, 6-relevant images / results properly labeled and displayed (if applicable), 7-antibiotics / irrigation fluids (if applicable), 8-safety precautions.  "

## 2023-01-30 NOTE — NURSING NOTE
"Chief Complaint   Patient presents with     Derm Problem     Lesion on left lower back.       Initial /72 (BP Location: Left arm, Patient Position: Sitting, Cuff Size: Adult Large)   Pulse 62   Temp 97.1  F (36.2  C) (Tympanic)   Resp 18   Wt 102 kg (224 lb 12.8 oz)   SpO2 99%   BMI 35.47 kg/m   Estimated body mass index is 35.47 kg/m  as calculated from the following:    Height as of 1/9/23: 1.695 m (5' 6.75\").    Weight as of this encounter: 102 kg (224 lb 12.8 oz).  Medication Reconciliation: complete    Shweta Portillo LPN  "

## 2023-01-30 NOTE — PROGRESS NOTES
Procedure Note     Pre/Post Operative Diagnosis:   Left lower back skin lesion     Procedure:    Excision of Left lower back skin lesion     Surgeon: MYLA Biggs MD     Local Anesthesia: 1% lidocaine with0.25%Marcaine with epinephrine    Indication for the procedure:    This is a 77 year old male patient with Left lower back skin lesion.  Patient states the lesion has been slowly getting bigger.  Denies previous history of skin cancer.  Patient notes many sunburns as a child and essentially never uses sunscreen.  Clinically, there is a 6 mm x 1.2 cm pigmented lesion on the left lower back.  We will plan for excision today.  After explaining the risks to include bleeding, infection, recurrence or need for re-excision, and scarring the patient wished to proceed.    Procedure:   The area was prepped and draped in usual sterile fashion with ChloraPrep. After adequate local anesthesia, an elliptical skin incision was made to encompass the lesion, 2.7cm x 1.1 cm with margins. The subcutaneous tissues were reapproximated with 3-0 vicryl in inverted interrupted fashion. The skin was closed with 4-0 monocryl suture in a running fashion.  Dermabond was applied.     Plan:  The patient will be called with pathology results.  Patient will followup if there any problems with the wound including redness or drainage.      MYLA Biggs MD

## 2023-01-30 NOTE — PATIENT INSTRUCTIONS
Your incision was closed with stitches that will dissolve.  A glue was used to help keep the incision closed.    It is ok to get the incision wet in the shower on the day after your procedure.     Don't soak in a tub, pool or lake for 1 week. If you have concerns, please call.

## 2023-02-03 ENCOUNTER — TELEPHONE (OUTPATIENT)
Dept: SURGERY | Facility: OTHER | Age: 78
End: 2023-02-03

## 2023-02-03 LAB
PATH REPORT.COMMENTS IMP SPEC: NORMAL
PATH REPORT.FINAL DX SPEC: NORMAL
PHOTO IMAGE: NORMAL

## 2023-02-03 NOTE — TELEPHONE ENCOUNTER
Calling to jae Biggs know that there was a Maglinte melanoma on the left low back.   Any questions please call back   Thank you   Nila Lu on 2/3/2023 at 3:22 PM

## 2023-02-09 ENCOUNTER — OFFICE VISIT (OUTPATIENT)
Dept: SURGERY | Facility: OTHER | Age: 78
End: 2023-02-09
Attending: SURGERY
Payer: MEDICARE

## 2023-02-09 VITALS
DIASTOLIC BLOOD PRESSURE: 80 MMHG | BODY MASS INDEX: 36.14 KG/M2 | RESPIRATION RATE: 18 BRPM | SYSTOLIC BLOOD PRESSURE: 138 MMHG | OXYGEN SATURATION: 96 % | HEART RATE: 58 BPM | TEMPERATURE: 96.9 F | WEIGHT: 229 LBS

## 2023-02-09 DIAGNOSIS — C43.59 MALIGNANT MELANOMA OF TORSO EXCLUDING BREAST (H): Primary | ICD-10-CM

## 2023-02-09 PROCEDURE — 99024 POSTOP FOLLOW-UP VISIT: CPT | Performed by: SURGERY

## 2023-02-09 PROCEDURE — G0463 HOSPITAL OUTPT CLINIC VISIT: HCPCS

## 2023-02-09 RX ORDER — CEFAZOLIN SODIUM 2 G/100ML
2 INJECTION, SOLUTION INTRAVENOUS
Status: CANCELLED | OUTPATIENT
Start: 2023-02-09

## 2023-02-09 RX ORDER — CEFAZOLIN SODIUM 2 G/100ML
2 INJECTION, SOLUTION INTRAVENOUS SEE ADMIN INSTRUCTIONS
Status: CANCELLED | OUTPATIENT
Start: 2023-02-09

## 2023-02-09 RX ORDER — ACETAMINOPHEN 500 MG
1000 TABLET ORAL 2 TIMES DAILY
COMMUNITY
End: 2024-01-18

## 2023-02-09 RX ORDER — ACETAMINOPHEN 325 MG/1
975 TABLET ORAL ONCE
Status: CANCELLED | OUTPATIENT
Start: 2023-02-09 | End: 2023-02-09

## 2023-02-09 ASSESSMENT — PAIN SCALES - GENERAL: PAINLEVEL: NO PAIN (0)

## 2023-02-09 NOTE — NURSING NOTE
"Chief Complaint   Patient presents with     Clinic Care Coordination - Follow-up     Here today to follow up for a melanoma.       Initial /80 (BP Location: Right arm, Patient Position: Sitting, Cuff Size: Adult Large)   Pulse 58   Temp 96.9  F (36.1  C) (Tympanic)   Resp 18   Wt 103.9 kg (229 lb)   SpO2 96%   BMI 36.14 kg/m   Estimated body mass index is 36.14 kg/m  as calculated from the following:    Height as of 1/9/23: 1.695 m (5' 6.75\").    Weight as of this encounter: 103.9 kg (229 lb).  Medication Reconciliation: complete    Shweta Portillo LPN  "

## 2023-02-09 NOTE — PROGRESS NOTES
Justino Wang presents to clinic for follow-up after undergoing left lower back skin excision.  Patient states he is doing well.  Denies pain from that area.  States it is healing well, no drainage, bleeding, swelling or other signs of infection.  Patient states he can easily achieve 4 metabolic levels of activity.  Does not take blood thinners aside from baby aspirin daily.  No previous history of problems bleeding or anesthesia problems.  History of A-fib status post cardioversion first-degree AV block.      /80 (BP Location: Right arm, Patient Position: Sitting, Cuff Size: Adult Large)   Pulse 58   Temp 96.9  F (36.1  C) (Tympanic)   Resp 18   Wt 103.9 kg (229 lb)   SpO2 96%   BMI 36.14 kg/m      Patient is sitting up in chair, appears comfortable  Numerous small, pigmented lesions about the face which seem to be mixture of solar lentigo and small seborrheic keratoses.  No axillary lymphadenopathy  No increased work of breathing, lungs are clear to auscultation bilaterally  Heart is regular rate and rhythm, no murmur  Abdomen is soft, nontender, nondistended.  No inguinal lymphadenopathy  Normal bulk and tone, no lower extremity edema.  Left lower back incision appears to be healing well with no surrounding erythema or induration.  Alert and oriented, normal speech and mentation       surgical pathology report for lower back skin lesion shows a malignant melanoma with thickness of 2.8 mm and mitotic index of 5, no ulceration.        Justino Wang is a 77-year-old male status post lower back skin excision.  The skin lesion was found to be a thick melanoma with worrisome features of high mitotic index.  Patient will require wide local excision with 2 cm margins and sentinel lymph node biopsy.  The technical details of this procedure were discussed with the patient along with the risk and benefits include bleeding, infection, injury to surrounding structures including underlying muscles and  nerves and blood vessels.  The patient demonstrates understanding and will proceed.  We will coordinate lymphoscintigraphy with radiology department on the day of surgery.      Schedule for wide local excision of lower back melanoma with sentinel lymph node biopsy on 2/15/2023  Coordinate lymphoscintigraphy with radiology department  Preop EKG for history of A-fib status post cardioversion and first-degree AV block.      David Biggs MD

## 2023-02-21 ENCOUNTER — ANESTHESIA EVENT (OUTPATIENT)
Dept: SURGERY | Facility: OTHER | Age: 78
End: 2023-02-21
Payer: MEDICARE

## 2023-02-22 ENCOUNTER — HOSPITAL ENCOUNTER (OUTPATIENT)
Dept: NUCLEAR MEDICINE | Facility: OTHER | Age: 78
Setting detail: NUCLEAR MEDICINE
Discharge: HOME | End: 2023-02-22
Attending: SURGERY
Payer: MEDICARE

## 2023-02-22 ENCOUNTER — ANESTHESIA (OUTPATIENT)
Dept: SURGERY | Facility: OTHER | Age: 78
End: 2023-02-22
Payer: MEDICARE

## 2023-02-22 ENCOUNTER — HOSPITAL ENCOUNTER (OUTPATIENT)
Facility: OTHER | Age: 78
Discharge: HOME | End: 2023-02-22
Attending: SURGERY | Admitting: SURGERY
Payer: MEDICARE

## 2023-02-22 VITALS
WEIGHT: 221 LBS | SYSTOLIC BLOOD PRESSURE: 182 MMHG | OXYGEN SATURATION: 98 % | HEART RATE: 56 BPM | DIASTOLIC BLOOD PRESSURE: 92 MMHG | BODY MASS INDEX: 34.87 KG/M2 | RESPIRATION RATE: 16 BRPM | TEMPERATURE: 97.2 F

## 2023-02-22 DIAGNOSIS — Z98.890 POSTOPERATIVE STATE: Primary | ICD-10-CM

## 2023-02-22 DIAGNOSIS — C43.59 MALIGNANT MELANOMA OF TORSO EXCLUDING BREAST (H): ICD-10-CM

## 2023-02-22 PROCEDURE — 360N000076 HC SURGERY LEVEL 3, PER MIN: Performed by: SURGERY

## 2023-02-22 PROCEDURE — 258N000003 HC RX IP 258 OP 636: Performed by: NURSE ANESTHETIST, CERTIFIED REGISTERED

## 2023-02-22 PROCEDURE — 88305 TISSUE EXAM BY PATHOLOGIST: CPT

## 2023-02-22 PROCEDURE — 250N000009 HC RX 250: Performed by: NURSE ANESTHETIST, CERTIFIED REGISTERED

## 2023-02-22 PROCEDURE — 272N000001 HC OR GENERAL SUPPLY STERILE: Performed by: SURGERY

## 2023-02-22 PROCEDURE — 250N000009 HC RX 250: Performed by: SURGERY

## 2023-02-22 PROCEDURE — 78195 LYMPH SYSTEM IMAGING: CPT | Mod: MG

## 2023-02-22 PROCEDURE — 250N000011 HC RX IP 250 OP 636: Performed by: SURGERY

## 2023-02-22 PROCEDURE — 370N000017 HC ANESTHESIA TECHNICAL FEE, PER MIN: Performed by: SURGERY

## 2023-02-22 PROCEDURE — 11602 EXC TR-EXT MAL+MARG 1.1-2 CM: CPT | Performed by: NURSE ANESTHETIST, CERTIFIED REGISTERED

## 2023-02-22 PROCEDURE — 99100 ANES PT EXTEME AGE<1 YR&>70: CPT | Performed by: NURSE ANESTHETIST, CERTIFIED REGISTERED

## 2023-02-22 PROCEDURE — A9520 TC99 TILMANOCEPT DIAG 0.5MCI: HCPCS | Performed by: SURGERY

## 2023-02-22 PROCEDURE — 710N000012 HC RECOVERY PHASE 2, PER MINUTE: Performed by: SURGERY

## 2023-02-22 PROCEDURE — 88307 TISSUE EXAM BY PATHOLOGIST: CPT

## 2023-02-22 PROCEDURE — 88342 IMHCHEM/IMCYTCHM 1ST ANTB: CPT

## 2023-02-22 PROCEDURE — 710N000010 HC RECOVERY PHASE 1, LEVEL 2, PER MIN: Performed by: SURGERY

## 2023-02-22 PROCEDURE — 38531 OPEN BX/EXC INGUINOFEM NODES: CPT | Performed by: SURGERY

## 2023-02-22 PROCEDURE — 250N000025 HC SEVOFLURANE, PER MIN: Performed by: SURGERY

## 2023-02-22 PROCEDURE — 250N000011 HC RX IP 250 OP 636: Performed by: NURSE ANESTHETIST, CERTIFIED REGISTERED

## 2023-02-22 PROCEDURE — 12031 INTMD RPR S/A/T/EXT 2.5 CM/<: CPT | Mod: XS | Performed by: SURGERY

## 2023-02-22 PROCEDURE — 999N000141 HC STATISTIC PRE-PROCEDURE NURSING ASSESSMENT: Performed by: SURGERY

## 2023-02-22 PROCEDURE — 11602 EXC TR-EXT MAL+MARG 1.1-2 CM: CPT | Performed by: SURGERY

## 2023-02-22 PROCEDURE — 343N000001 HC RX 343: Performed by: SURGERY

## 2023-02-22 PROCEDURE — 250N000013 HC RX MED GY IP 250 OP 250 PS 637: Performed by: SURGERY

## 2023-02-22 RX ORDER — HYDROMORPHONE HYDROCHLORIDE 1 MG/ML
0.2 INJECTION, SOLUTION INTRAMUSCULAR; INTRAVENOUS; SUBCUTANEOUS EVERY 5 MIN PRN
Status: DISCONTINUED | OUTPATIENT
Start: 2023-02-22 | End: 2023-02-22 | Stop reason: HOSPADM

## 2023-02-22 RX ORDER — SODIUM CHLORIDE, SODIUM LACTATE, POTASSIUM CHLORIDE, CALCIUM CHLORIDE 600; 310; 30; 20 MG/100ML; MG/100ML; MG/100ML; MG/100ML
INJECTION, SOLUTION INTRAVENOUS CONTINUOUS
Status: DISCONTINUED | OUTPATIENT
Start: 2023-02-22 | End: 2023-02-22 | Stop reason: HOSPADM

## 2023-02-22 RX ORDER — GLYCOPYRROLATE 0.2 MG/ML
INJECTION, SOLUTION INTRAMUSCULAR; INTRAVENOUS PRN
Status: DISCONTINUED | OUTPATIENT
Start: 2023-02-22 | End: 2023-02-22

## 2023-02-22 RX ORDER — NALOXONE HYDROCHLORIDE 0.4 MG/ML
0.2 INJECTION, SOLUTION INTRAMUSCULAR; INTRAVENOUS; SUBCUTANEOUS
Status: DISCONTINUED | OUTPATIENT
Start: 2023-02-22 | End: 2023-02-22 | Stop reason: HOSPADM

## 2023-02-22 RX ORDER — LIDOCAINE HYDROCHLORIDE 20 MG/ML
INJECTION, SOLUTION INFILTRATION; PERINEURAL PRN
Status: DISCONTINUED | OUTPATIENT
Start: 2023-02-22 | End: 2023-02-22

## 2023-02-22 RX ORDER — FENTANYL CITRATE 50 UG/ML
25 INJECTION, SOLUTION INTRAMUSCULAR; INTRAVENOUS EVERY 5 MIN PRN
Status: DISCONTINUED | OUTPATIENT
Start: 2023-02-22 | End: 2023-02-22 | Stop reason: HOSPADM

## 2023-02-22 RX ORDER — ONDANSETRON 2 MG/ML
4 INJECTION INTRAMUSCULAR; INTRAVENOUS EVERY 30 MIN PRN
Status: DISCONTINUED | OUTPATIENT
Start: 2023-02-22 | End: 2023-02-22 | Stop reason: HOSPADM

## 2023-02-22 RX ORDER — FENTANYL CITRATE 50 UG/ML
50 INJECTION, SOLUTION INTRAMUSCULAR; INTRAVENOUS EVERY 5 MIN PRN
Status: DISCONTINUED | OUTPATIENT
Start: 2023-02-22 | End: 2023-02-22 | Stop reason: HOSPADM

## 2023-02-22 RX ORDER — LIDOCAINE 40 MG/G
CREAM TOPICAL
Status: DISCONTINUED | OUTPATIENT
Start: 2023-02-22 | End: 2023-02-22 | Stop reason: HOSPADM

## 2023-02-22 RX ORDER — ONDANSETRON 2 MG/ML
INJECTION INTRAMUSCULAR; INTRAVENOUS PRN
Status: DISCONTINUED | OUTPATIENT
Start: 2023-02-22 | End: 2023-02-22

## 2023-02-22 RX ORDER — KETOROLAC TROMETHAMINE 30 MG/ML
INJECTION, SOLUTION INTRAMUSCULAR; INTRAVENOUS PRN
Status: DISCONTINUED | OUTPATIENT
Start: 2023-02-22 | End: 2023-02-22

## 2023-02-22 RX ORDER — NALOXONE HYDROCHLORIDE 0.4 MG/ML
0.4 INJECTION, SOLUTION INTRAMUSCULAR; INTRAVENOUS; SUBCUTANEOUS
Status: DISCONTINUED | OUTPATIENT
Start: 2023-02-22 | End: 2023-02-22 | Stop reason: HOSPADM

## 2023-02-22 RX ORDER — ACETAMINOPHEN 325 MG/1
650 TABLET ORAL
Status: DISCONTINUED | OUTPATIENT
Start: 2023-02-22 | End: 2023-02-22 | Stop reason: HOSPADM

## 2023-02-22 RX ORDER — PROPOFOL 10 MG/ML
INJECTION, EMULSION INTRAVENOUS PRN
Status: DISCONTINUED | OUTPATIENT
Start: 2023-02-22 | End: 2023-02-22

## 2023-02-22 RX ORDER — SODIUM CHLORIDE, SODIUM LACTATE, POTASSIUM CHLORIDE, CALCIUM CHLORIDE 600; 310; 30; 20 MG/100ML; MG/100ML; MG/100ML; MG/100ML
INJECTION, SOLUTION INTRAVENOUS CONTINUOUS PRN
Status: DISCONTINUED | OUTPATIENT
Start: 2023-02-22 | End: 2023-02-22

## 2023-02-22 RX ORDER — CEFAZOLIN SODIUM/WATER 2 G/20 ML
2 SYRINGE (ML) INTRAVENOUS SEE ADMIN INSTRUCTIONS
Status: DISCONTINUED | OUTPATIENT
Start: 2023-02-22 | End: 2023-02-22 | Stop reason: HOSPADM

## 2023-02-22 RX ORDER — OXYCODONE HYDROCHLORIDE 5 MG/1
5 TABLET ORAL
Status: DISCONTINUED | OUTPATIENT
Start: 2023-02-22 | End: 2023-02-22 | Stop reason: HOSPADM

## 2023-02-22 RX ORDER — FENTANYL CITRATE 50 UG/ML
INJECTION, SOLUTION INTRAMUSCULAR; INTRAVENOUS PRN
Status: DISCONTINUED | OUTPATIENT
Start: 2023-02-22 | End: 2023-02-22

## 2023-02-22 RX ORDER — ONDANSETRON 4 MG/1
4 TABLET, ORALLY DISINTEGRATING ORAL EVERY 30 MIN PRN
Status: DISCONTINUED | OUTPATIENT
Start: 2023-02-22 | End: 2023-02-22 | Stop reason: HOSPADM

## 2023-02-22 RX ORDER — ISOSULFAN BLUE 50 MG/5ML
INJECTION, SOLUTION SUBCUTANEOUS PRN
Status: DISCONTINUED | OUTPATIENT
Start: 2023-02-22 | End: 2023-02-22 | Stop reason: HOSPADM

## 2023-02-22 RX ORDER — ONDANSETRON 4 MG/1
4 TABLET, ORALLY DISINTEGRATING ORAL
Status: DISCONTINUED | OUTPATIENT
Start: 2023-02-22 | End: 2023-02-22 | Stop reason: HOSPADM

## 2023-02-22 RX ORDER — LIDOCAINE HYDROCHLORIDE AND EPINEPHRINE 10; 10 MG/ML; UG/ML
INJECTION, SOLUTION INFILTRATION; PERINEURAL PRN
Status: DISCONTINUED | OUTPATIENT
Start: 2023-02-22 | End: 2023-02-22 | Stop reason: HOSPADM

## 2023-02-22 RX ORDER — SENNA AND DOCUSATE SODIUM 50; 8.6 MG/1; MG/1
1 TABLET, FILM COATED ORAL PRN
Qty: 20 TABLET | Refills: 0 | Status: SHIPPED | OUTPATIENT
Start: 2023-02-22 | End: 2023-10-23

## 2023-02-22 RX ORDER — HYDROMORPHONE HYDROCHLORIDE 1 MG/ML
0.4 INJECTION, SOLUTION INTRAMUSCULAR; INTRAVENOUS; SUBCUTANEOUS EVERY 5 MIN PRN
Status: DISCONTINUED | OUTPATIENT
Start: 2023-02-22 | End: 2023-02-22 | Stop reason: HOSPADM

## 2023-02-22 RX ORDER — ACETAMINOPHEN 325 MG/1
975 TABLET ORAL ONCE
Status: COMPLETED | OUTPATIENT
Start: 2023-02-22 | End: 2023-02-22

## 2023-02-22 RX ORDER — CEFAZOLIN SODIUM/WATER 2 G/20 ML
2 SYRINGE (ML) INTRAVENOUS
Status: COMPLETED | OUTPATIENT
Start: 2023-02-22 | End: 2023-02-22

## 2023-02-22 RX ORDER — OXYCODONE AND ACETAMINOPHEN 5; 325 MG/1; MG/1
1 TABLET ORAL EVERY 6 HOURS PRN
Qty: 12 TABLET | Refills: 0 | Status: SHIPPED | OUTPATIENT
Start: 2023-02-22 | End: 2023-02-25

## 2023-02-22 RX ORDER — DEXAMETHASONE SODIUM PHOSPHATE 4 MG/ML
INJECTION, SOLUTION INTRA-ARTICULAR; INTRALESIONAL; INTRAMUSCULAR; INTRAVENOUS; SOFT TISSUE PRN
Status: DISCONTINUED | OUTPATIENT
Start: 2023-02-22 | End: 2023-02-22

## 2023-02-22 RX ADMIN — DEXAMETHASONE SODIUM PHOSPHATE 8 MG: 4 INJECTION, SOLUTION INTRA-ARTICULAR; INTRALESIONAL; INTRAMUSCULAR; INTRAVENOUS; SOFT TISSUE at 14:01

## 2023-02-22 RX ADMIN — FENTANYL CITRATE 25 MCG: 50 INJECTION, SOLUTION INTRAMUSCULAR; INTRAVENOUS at 13:45

## 2023-02-22 RX ADMIN — SODIUM CHLORIDE, POTASSIUM CHLORIDE, SODIUM LACTATE AND CALCIUM CHLORIDE: 600; 310; 30; 20 INJECTION, SOLUTION INTRAVENOUS at 12:08

## 2023-02-22 RX ADMIN — GLYCOPYRROLATE 0.2 MG: 0.2 INJECTION, SOLUTION INTRAMUSCULAR; INTRAVENOUS at 12:39

## 2023-02-22 RX ADMIN — TILMANOCEPT 0.87 MILLICURIE: KIT at 10:15

## 2023-02-22 RX ADMIN — ONDANSETRON HYDROCHLORIDE 4 MG: 2 SOLUTION INTRAMUSCULAR; INTRAVENOUS at 14:01

## 2023-02-22 RX ADMIN — Medication 2 G: at 12:08

## 2023-02-22 RX ADMIN — SODIUM CHLORIDE, SODIUM LACTATE, POTASSIUM CHLORIDE, AND CALCIUM CHLORIDE: 600; 310; 30; 20 INJECTION, SOLUTION INTRAVENOUS at 12:37

## 2023-02-22 RX ADMIN — ACETAMINOPHEN 975 MG: 325 TABLET ORAL at 11:59

## 2023-02-22 RX ADMIN — SODIUM CHLORIDE, SODIUM LACTATE, POTASSIUM CHLORIDE, AND CALCIUM CHLORIDE: 600; 310; 30; 20 INJECTION, SOLUTION INTRAVENOUS at 13:25

## 2023-02-22 RX ADMIN — FENTANYL CITRATE 25 MCG: 50 INJECTION, SOLUTION INTRAMUSCULAR; INTRAVENOUS at 13:59

## 2023-02-22 RX ADMIN — FENTANYL CITRATE 50 MCG: 50 INJECTION, SOLUTION INTRAMUSCULAR; INTRAVENOUS at 12:39

## 2023-02-22 RX ADMIN — KETOROLAC TROMETHAMINE 30 MG: 30 INJECTION, SOLUTION INTRAMUSCULAR at 14:01

## 2023-02-22 RX ADMIN — PROPOFOL 170 MG: 10 INJECTION, EMULSION INTRAVENOUS at 12:43

## 2023-02-22 RX ADMIN — LIDOCAINE HYDROCHLORIDE 100 MG: 20 INJECTION, SOLUTION INFILTRATION; PERINEURAL at 12:43

## 2023-02-22 ASSESSMENT — ENCOUNTER SYMPTOMS: DYSRHYTHMIAS: 1

## 2023-02-22 ASSESSMENT — ACTIVITIES OF DAILY LIVING (ADL)
ADLS_ACUITY_SCORE: 35
ADLS_ACUITY_SCORE: 35

## 2023-02-22 NOTE — DISCHARGE INSTRUCTIONS
[facility]  Adult Discharge Orders & Instructions      For 24 hours after surgery:  Get plenty of rest.  A responsible adult must stay with you for at least 24 hours after you leave the hospital.   You may feel lightheaded.  IF so, sit for a few minutes before standing.  Have someone help you get up.   You may have a slight fever. Call the doctor if your fever is over 101 F (38.3 C) (taken under the tongue) or lasts longer than 24 hours.  You may have a dry mouth, a sore throat, muscle aches or trouble sleeping.  These should go away after 24 hours.  Do not make important or legal decisions.  6.   Do not drive or use heavy equipment.  If you have weakness or tingling, don't drive or use heavy equipment until this feeling goes away.                                                                                                                                                                         To contact a doctor, call    [phone]______________

## 2023-02-22 NOTE — OR NURSING
patient has been discharged to home at 1616 via wheelchair accompanied by wife    Written discharge instructions were provided to patient.  Prescriptions were escrib[facility].      Patient and adult caring for them verbalize understanding of discharge instructions including no driving until tomorrow and no longer taking narcotic pain medications - no operating mechanical equipment and no making any important decisions.They understand reason for discharge, and necessary follow-up appointments.

## 2023-02-22 NOTE — ANESTHESIA CARE TRANSFER NOTE
Patient: Justino Wang    Procedure: Procedure(s):  WIDE LOCAL EXCISION OF BACK LESION, FOR MELANOMA , BIOPSY, LYMPH NODE, SENTINEL       Diagnosis: Malignant melanoma of torso excluding breast (H) [C43.59]  Diagnosis Additional Information: No value filed.    Anesthesia Type:   General     Note:      Level of Consciousness: awake  Oxygen Supplementation: face mask  Level of Supplemental Oxygen (L/min / FiO2): 8  Independent Airway: airway patency satisfactory and stable  Dentition: dentition unchanged  Vital Signs Stable: post-procedure vital signs reviewed and stable  Report to RN Given: handoff report given  Patient transferred to: PACU    Handoff Report: Identifed the Patient, Identified the Reponsible Provider, Reviewed the pertinent medical history, Discussed the surgical course, Reviewed Intra-OP anesthesia mangement and issues during anesthesia, Set expectations for post-procedure period and Allowed opportunity for questions and acknowledgement of understanding      Vitals:  Vitals Value Taken Time   BP     Temp     Pulse 59 02/22/23 1421   Resp 7 02/22/23 1421   SpO2 98 % 02/22/23 1421   Vitals shown include unvalidated device data.    Electronically Signed By: David Kellerman, APRN CRNA  February 22, 2023  2:22 PM

## 2023-02-22 NOTE — ANESTHESIA PREPROCEDURE EVALUATION
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      Wt Readings from Last 1 Encounters:   23 100.2 kg (221 lb)        Anesthesia Evaluation   Pt has had prior anesthetic. Type: MAC.        ROS/MED HX  ENT/Pulmonary:     (+) ELY risk factors, hypertension, obese,     Neurologic:  - neg neurologic ROS     Cardiovascular: Comment: Heart rate regular today with occasional skipped beats    (+) --CAD ---dysrhythmias, a-fib and 1st Deg Heart Block,     METS/Exercise Tolerance: >4 METS    Hematologic:  - neg hematologic  ROS     Musculoskeletal:  - neg musculoskeletal ROS     GI/Hepatic:  - neg GI/hepatic ROS     Renal/Genitourinary:  - neg Renal ROS     Endo:     (+) thyroid problem, Obesity,     Psychiatric/Substance Use:  - neg psychiatric ROS     Infectious Disease:  - neg infectious disease ROS     Malignancy:   (+) Malignancy,  History of Skin.    Other:  - neg other ROS          Physical Exam    Airway        Mallampati: III   TM distance: < 3 FB   Neck ROM: full   Mouth opening: > 3 cm    Respiratory Devices and Support         Dental     Comment: Worn teeth        Cardiovascular   cardiovascular exam normal    Comment: Occasional irregular skipped beat but overall regular      Pulmonary   pulmonary exam normal                OUTSIDE LABS:  CBC:   Lab Results   Component Value Date    WBC 5.1 11/30/2020    HGB 14.4 11/30/2020    HCT 43.3 11/30/2020     11/30/2020     BMP:   Lab Results   Component Value Date     01/09/2023     01/03/2022    POTASSIUM 4.3 01/09/2023    POTASSIUM 4.5 01/03/2022    CHLORIDE 105 01/09/2023    CHLORIDE 104 01/03/2022    CO2 27 01/09/2023    CO2 27 01/03/2022    BUN 12.8 01/09/2023    BUN 16 01/03/2022    CR 0.74 01/09/2023    CR 0.83 01/03/2022     (H) 01/09/2023    GLC 99 01/03/2022     COAGS: No results found for: PTT, INR, FIBR  POC: No results found for: BGM, HCG, HCGS  HEPATIC:   Lab Results   Component Value Date    ALBUMIN 4.2 01/09/2023    PROTTOTAL 7.2 01/09/2023    ALT 20 01/09/2023    AST 21 01/09/2023    ALKPHOS 98 01/09/2023    BILITOTAL 0.8 01/09/2023     OTHER:   Lab Results   Component Value Date    A1C 5.4 11/30/2020    TIERA 9.1 01/09/2023    TSH 0.93 01/09/2023       Anesthesia Plan    ASA Status:  3   NPO Status:  NPO Appropriate    Anesthesia Type: General.              Consents    Anesthesia Plan(s) and associated risks, benefits, and realistic alternatives discussed. Questions answered and patient/representative(s) expressed understanding.     - Discussed: Risks, Benefits and Alternatives for BOTH SEDATION and the PROCEDURE were discussed     - Discussed with:  Patient, Spouse      - Extended Intubation/Ventilatory Support Discussed: No.      - Patient is DNR/DNI Status: No    Use of blood products discussed: No .     Postoperative Care    Pain management: IV  analgesics, Multi-modal analgesia.   PONV prophylaxis: Ondansetron (or other 5HT-3), Dexamethasone or Solumedrol     Comments:                CRISTIAN BERNARDO CRNA

## 2023-02-22 NOTE — OR NURSING
PACU Transfer Note    Justino Wang was transferred to [facility] via bed .  Equipment used for transport:  Bed .  Accompanied by:  Mick ng   Prescriptions were: e scribed     PACU Respiratory Event Documentation     1) Episodes of Apnea greater than or equal to 10 seconds: no    2) Bradypnea - less than 8 breaths per minute: no    3) Pain score on 0 to 10 scale:  no    4) Pain-sedation mismatch (yes or no): no    5) Repeated 02 desaturation less than 90% (yes or no): no    Anesthesia notified? (yes or no): no    Any of the above events occuring repeatedly in separate 30 minute intervals may be considered recurrent PACU respiratory events.    Patient stable and meets phase 1 discharge criteria for transport from PACU.    Report to gigi

## 2023-02-22 NOTE — INTERVAL H&P NOTE
I saw and examined Justino Wang.  I have reviewed the history and physical and find no changes to the patient's medical status or condition with the exceptions noted below.     Lymphoscintigraphy localizes the sentinel lymph node to the left groin.       David Biggs MD   12:04 PM 2/22/2023

## 2023-02-22 NOTE — OP NOTE
PREOPERATIVE  DIAGNOSIS: malignant melanoma of the left lower back      POSTOPERATIVE DIAGNOSIS:malignant melanoma of the left lower back     PROCEDURE PERFORMED:  Wide local excision of left lower back melanoma and sentinel lymph node biopsy in left groin     ESTIMATED BLOOD LOSS: 10 mL      SURGEON:  David Biggs MD     ASSISTANT: Circulator: Alena Bautista RN; Zeina Morse RN  Scrub Person: Jodie Dooley  First Assistant: Kevin Caballero RN    ANESTHESIA: GeneralCRNA Independent: Kellerman, David, APRN CRNA    FAMILYPHYSICIAN: Fermín Mobley      INDICATION FOR THE PROCEDURE:  The patient is a 77 year old y.o. male with a malignant melanoma of the left lower back. Previously underwent excisional biopsy in clinic. This is a thick melanoma with high mitotic index, sentinel lymph node biopsy is indicated. Preoperative lymphoscintigraphy localized the sentinel lymph node to the left proximal groin.         PROCEDURE:  Justino Wang was brought to the operating room placed in supine position.    General anesthetic was applied and LMA was placed. The patient was then placed in the right lateral decubitus position. Timeout was performed and everyone was in agreement to proceed. The skin surrounding the lesion was cleansed with alcohol and 3mL of isosulfan blue was injected in the dermis around the entire area of the previous excisional biopsy.   Patient's left lower back was prepped and draped in usual sterile fashion with chloraprep.    2 cm was marked in every direction from the previous incision. The skin was infiltrated with local anesthetic. An elliptical incision was made around the previous incision, as marked. Dissection was carried down to below the layer of blayne's fascia and the lesion was excised. The superior margin was marked with short suture, the lateral margin was marked with long suture. The specimen was passed off the table. The subcutaneous tissues and dermis were re  approximated with running 2-0 vicryl suture. Skin was closed with running 4-0 monocryl suture. The skin was cleansed and dried and skin glue was applied to the incision.     The patient was then moved to the supine position. The patient's left groin was prepped and draped in the usual sterile fashion. The neoprobe was used to locate the area of greatest signal in the area. This corresponded with the pre-operative marking by radiology. The skin was infiltrated with local anesthetic and a 4 cm incision was made directly over the area of greatest signal. Dissection was carried down through the skin and subcutaneous tissues until a palpable lymph node was encountered. This node appears to be the sentinel lymph node. There are blue lymphatics running to it and the neoprobe localizes it here, peak in vivo counts of ~950. The node was carefully dissected with liberal use of clips and cautery. The node was removed from the body and max ex vivo count of ~450. The wound was irrigated and there was no bleeding. The subcutaneous tissues and dermis were reapproximated using interrupted 3-0 vicryl suture.  Skin was closed with a running 4-0 Monocryl suture.  The skin was cleansed and dried and skin glue was applied to the incision.  At the end of the case all sponge and needle counts were correct.  The patient tolerated procedure well. LMA was removed in the OR and the patient was taken to the recovery room in good condition.        MYLA Biggs MD

## 2023-02-22 NOTE — ANESTHESIA POSTPROCEDURE EVALUATION
Patient: Justino Wang    Procedure: Procedure(s):  WIDE LOCAL EXCISION OF BACK LESION, FOR MELANOMA , BIOPSY, LYMPH NODE, SENTINEL       Anesthesia Type:  General    Note:  Disposition: Outpatient   Postop Pain Control: Uneventful            Sign Out: Well controlled pain   PONV: No   Neuro/Psych: Uneventful            Sign Out: Acceptable/Baseline neuro status   Airway/Respiratory: Uneventful            Sign Out: Acceptable/Baseline resp. status   CV/Hemodynamics: Uneventful            Sign Out: Acceptable CV status; No obvious hypovolemia; No obvious fluid overload   Other NRE: NONE   DID A NON-ROUTINE EVENT OCCUR?            Last vitals:  Vitals Value Taken Time   /86 02/22/23 1441   Temp 97.2  F (36.2  C) 02/22/23 1440   Pulse 57 02/22/23 1441   Resp 8 02/22/23 1441   SpO2 100 % 02/22/23 1441   Vitals shown include unvalidated device data.    Electronically Signed By: CRISTIAN BERNARDO CRNA  February 22, 2023  3:07 PM

## 2023-02-23 ENCOUNTER — TELEPHONE (OUTPATIENT)
Dept: SURGERY | Facility: OTHER | Age: 78
End: 2023-02-23

## 2023-02-23 NOTE — TELEPHONE ENCOUNTER
"Patient contacted for routine follow-up call post procedure. Pt expressed concern with amount of blood draining from surgical site. Pt stated \"I've had to change the dressing three times since discharge yesterday. When I got home from the hospital it soaked through the gauze and bled a 4-inch [place] on my sweatshirt.\" Pt stated \"It seems to be decreasing but I'm concerned with how much blood has drained.\"    Please contact patient for follow-up and advise patient on care for incision.  "

## 2023-02-27 ENCOUNTER — OFFICE VISIT (OUTPATIENT)
Dept: SURGERY | Facility: OTHER | Age: 78
End: 2023-02-27
Attending: SURGERY
Payer: MEDICARE

## 2023-02-27 VITALS
HEART RATE: 68 BPM | SYSTOLIC BLOOD PRESSURE: 138 MMHG | RESPIRATION RATE: 20 BRPM | BODY MASS INDEX: 35.88 KG/M2 | WEIGHT: 227.4 LBS | TEMPERATURE: 97.1 F | DIASTOLIC BLOOD PRESSURE: 82 MMHG | OXYGEN SATURATION: 97 %

## 2023-02-27 DIAGNOSIS — Z98.890 POSTOPERATIVE STATE: Primary | ICD-10-CM

## 2023-02-27 PROCEDURE — 99024 POSTOP FOLLOW-UP VISIT: CPT | Performed by: SURGERY

## 2023-02-27 PROCEDURE — G0463 HOSPITAL OUTPT CLINIC VISIT: HCPCS

## 2023-02-27 ASSESSMENT — PAIN SCALES - GENERAL: PAINLEVEL: NO PAIN (0)

## 2023-02-27 NOTE — NURSING NOTE
"Chief Complaint   Patient presents with     Post-Op - General Surgery     Here today to follow up post-op.       Initial /82 (BP Location: Right arm, Patient Position: Sitting, Cuff Size: Adult Large)   Pulse 68   Temp 97.1  F (36.2  C) (Tympanic)   Resp 20   Wt 103.1 kg (227 lb 6.4 oz)   SpO2 97%   BMI 35.88 kg/m   Estimated body mass index is 35.88 kg/m  as calculated from the following:    Height as of 1/9/23: 1.695 m (5' 6.75\").    Weight as of this encounter: 103.1 kg (227 lb 6.4 oz).  Medication Reconciliation: complete    Shweta Portillo LPN  "

## 2023-02-27 NOTE — PROGRESS NOTES
Justino Wang presents to clinic for follow-up after undergoing wide local excision of left lower back and left inguinal sentinel lymph node biopsy.  Patient states he had significant bleeding from the back incision for approximately 3 days after surgery, he is no longer bleeding.  He notes he has significant bruising around that area.  No problems with the groin incision.      /82 (BP Location: Right arm, Patient Position: Sitting, Cuff Size: Adult Large)   Pulse 68   Temp 97.1  F (36.2  C) (Tympanic)   Resp 20   Wt 103.1 kg (227 lb 6.4 oz)   SpO2 97%   BMI 35.88 kg/m      Back incision is clean, dry, intact with no evidence of underlying fluid collection or hematoma.  There is significant surrounding ecchymosis extending to the left lower abdomen.  Left inguinal incision is clean, dry, intact with no surrounding erythema or induration.  No evidence of underlying fluid collection or bruising.      Surgical pathology report is pending      Justino Wang is a 77-year-old male status post wide local excision of left lower back and left inguinal sentinel lymph node biopsy.  Patient's course was complicated with postoperative bleeding but this is now resolved.  Incision seem to be healing normally at this point.  Still awaiting pathology to make further plans regarding treatment and follow-up.      David Biggs MD

## 2023-03-02 DIAGNOSIS — C43.59 MALIGNANT MELANOMA OF TORSO EXCLUDING BREAST (H): Primary | ICD-10-CM

## 2023-03-07 LAB
PATH REPORT.COMMENTS IMP SPEC: NORMAL
PATH REPORT.FINAL DX SPEC: NORMAL
PATH REPORT.RELEVANT HX SPEC: NORMAL
PHOTO IMAGE: NORMAL

## 2023-03-09 ENCOUNTER — TELEPHONE (OUTPATIENT)
Dept: SURGERY | Facility: OTHER | Age: 78
End: 2023-03-09
Payer: MEDICARE

## 2023-03-09 ENCOUNTER — OFFICE VISIT (OUTPATIENT)
Dept: FAMILY MEDICINE | Facility: OTHER | Age: 78
End: 2023-03-09
Payer: MEDICARE

## 2023-03-09 VITALS
HEART RATE: 53 BPM | BODY MASS INDEX: 34.1 KG/M2 | DIASTOLIC BLOOD PRESSURE: 80 MMHG | TEMPERATURE: 97.8 F | WEIGHT: 225 LBS | SYSTOLIC BLOOD PRESSURE: 138 MMHG | RESPIRATION RATE: 16 BRPM | HEIGHT: 68 IN | OXYGEN SATURATION: 98 %

## 2023-03-09 DIAGNOSIS — T14.8XXA SKIN WOUND FROM SURGICAL INCISION: Primary | ICD-10-CM

## 2023-03-09 PROCEDURE — 99213 OFFICE O/P EST LOW 20 MIN: CPT | Performed by: NURSE PRACTITIONER

## 2023-03-09 PROCEDURE — G0463 HOSPITAL OUTPT CLINIC VISIT: HCPCS

## 2023-03-09 ASSESSMENT — PAIN SCALES - GENERAL: PAINLEVEL: NO PAIN (1)

## 2023-03-09 NOTE — PROGRESS NOTES
Justino Wang  1945    ASSESSMENT/PLAN:   1. Skin wound from surgical incision  Left lower back surgical incision well approximated, healing well with some central bleeding.  No warmth, erythema or purulent drainage, no signs of infection.  Patient has been afebrile.  No signs of hematoma or induration.  It appears that patient may have picked off a piece of glue or scab from center of incision causing bleeding.  Area is clean and cleansed with alcohol, pressure was applied to stop bleeding.  Small amount of Dermabond was applied over center of incision to stop bleeding.  Patient tolerated this well.  Nonadherent gauze was applied with paper tape.  Encouraged him to keep gauze and tape in place for the next 24 hours.  Patient and wife are agreeable to plan of care.    He has been exercising again, and I encouraged him to minimize twisting and forward flexion to prevent further strain on incision while it continues to heal.  He will make modifications to his exercises.  He knows to monitor for any signs of infection and return for further evaluation should bleeding persist.    Patient agrees with plan of care and verbalizes understating. AVS printed. Patient education provided verbally and written instructions provided as requested. Patient made aware of emergent sings and symptoms to monitor for and when to seek additional care/follow up.     SUBJECTIVE:   CHIEF COMPLAINT/ REASON FOR VISIT  Patient presents with:  Post-op Problem: Surgical incision bleeding- surgery 2/22     HISTORY OF PRESENT ILLNESS  Justino Wang is a pleasant 77 year old male presents to [facility] today with concerns for bleeding wound.    Patient had wide local excision of left lower back and left inguinal sentinel lymph node biopsy on 2/22/2023.  He was complicated with postoperative bleeding.  He was seen for postop follow-up on 2/27/2023 by his surgeon at that time back incision was clean dry and intact with no evidence of  "abscess or hematoma.  Left inguinal incision was also clean dry and intact, no surrounding erythema.  Pathology returned as melanoma.    Patient states after his postop follow-up he was doing well, no bleeding.  He has been feeling well.  Today after getting out of shower he pulled on something over the incision and it started bleeding.  Patient called surgeon today stating \"that glue is flaking off and there a long strings coming out of excision.  There is some bleeding\".  Patient was recommended by surgeon to go to [facility] for further evaluation.    I have reviewed the nursing notes.  I have reviewed allergies, medication list, problem list, and past medical history.    REVIEW OF SYSTEMS  Review of Systems   Musculoskeletal:        Left low back incision bleeding      VITAL SIGNS  Vitals:    03/09/23 1053   BP: 138/80   BP Location: Right arm   Patient Position: Sitting   Cuff Size: Adult Regular   Pulse: 53   Resp: 16   Temp: 97.8  F (36.6  C)   TempSrc: Tympanic   SpO2: 98%   Weight: 102.1 kg (225 lb)   Height: 1.727 m (5' 8\")      Body mass index is 34.21 kg/m .    OBJECTIVE:   PHYSICAL EXAM  Physical Exam  Vitals reviewed.   Constitutional:       Appearance: Normal appearance. He is not ill-appearing or toxic-appearing.   HENT:      Head: Normocephalic and atraumatic.      Nose: Nose normal.   Eyes:      Conjunctiva/sclera: Conjunctivae normal.   Pulmonary:      Effort: Pulmonary effort is normal.   Skin:     Comments: Left lower back incision-incision well approximated, healing well.  1 small area of bloody drainage at center of incision. No visible skin tear.  No tenderness to touch.  No firmness or induration.  No warmth, erythema or signs of infection   Neurological:      General: No focal deficit present.      Mental Status: He is alert and oriented to person, place, and time.   Psychiatric:         Mood and Affect: Mood normal.         Behavior: Behavior normal.         Thought Content: Thought " content normal.         Judgment: Judgment normal.        Kendra Doss NP  [facility]

## 2023-03-09 NOTE — NURSING NOTE
Chief Complaint   Patient presents with     Post-op Problem     Surgical incision bleeding- surgery 2/22   Patient in clinic with wife for post surgical bleeding.  Surgery 2 weeks ago - post op bleeding subsided after 5-6 days and has started again this am about 10.    Advanced Care Planning on file? yes    Medication Review Completed: complete    FOOD SECURITY SCREENING QUESTIONS:    The next two questions are to help us understand your food security.  If you are feeling you need any assistance in this area, we have resources available to support you today.    Hunger Vital Signs:  Within the past 12 months we worried whether our food would run out before we got money to buy more. Never  Within the past 12 months the food we bought just didn't last and we didn't have money to get more. Never    Delphine Dobbs LPN       Diabetes Education:  Patient diagnosed with new onset Pre-diabetes, HbA1c= 5.8%.  Admitted with diabetic foot ulcer.   Discussed nutrition, exercise, weight loss, and the importance of follow up with PCP.  Emphasized the importance of quitting smoking.  Discussed metformin.  Patient is completely uninterested and annoyed by this education.

## 2023-03-09 NOTE — TELEPHONE ENCOUNTER
Pt states that he is bleeding again. Please call ASAP.    Wiliam Huertas on 3/9/2023 at 10:00 AM

## 2023-03-09 NOTE — TELEPHONE ENCOUNTER
Post-op for excision of a melanoma. He states that the glue is flaking off and now there are long string-like things coming out of excision.  There is bleeding at one end.  Wonders if he should come in.  Per Dr. Biggs he [facility] to be evaluated.  Patient will be seen in [facility].  Shweta Portillo LPN..........3/9/2023  10:36 AM

## 2023-03-13 ENCOUNTER — TELEPHONE (OUTPATIENT)
Dept: ONCOLOGY | Facility: OTHER | Age: 78
End: 2023-03-13
Payer: MEDICARE

## 2023-03-13 NOTE — TELEPHONE ENCOUNTER
Oncology/Hematology Care Coordination - Referral Review    Referred by:  Dr. Biggs    Diagnosis:  Malignant melanoma of torso    Imaging:      Lab:      Surgery/Biopsy:  1/30/23 biopsy lesion on low back   2/22/23 Wide local excision of left lower back melanoma and sentinel lymph node biopsy in left groin        Pathology:  complete    Outside Records:      Kristen Webber RN on 3/13/2023 at 8:52 AM

## 2023-04-14 ENCOUNTER — ONCOLOGY VISIT (OUTPATIENT)
Dept: ONCOLOGY | Facility: OTHER | Age: 78
End: 2023-04-14
Attending: INTERNAL MEDICINE
Payer: MEDICARE

## 2023-04-14 VITALS
HEIGHT: 68 IN | HEART RATE: 60 BPM | OXYGEN SATURATION: 91 % | WEIGHT: 224.38 LBS | DIASTOLIC BLOOD PRESSURE: 80 MMHG | RESPIRATION RATE: 24 BRPM | TEMPERATURE: 97.4 F | SYSTOLIC BLOOD PRESSURE: 130 MMHG | BODY MASS INDEX: 34.01 KG/M2

## 2023-04-14 DIAGNOSIS — C43.59 MALIGNANT MELANOMA OF TORSO EXCLUDING BREAST (H): ICD-10-CM

## 2023-04-14 PROCEDURE — G0463 HOSPITAL OUTPT CLINIC VISIT: HCPCS

## 2023-04-14 PROCEDURE — 99205 OFFICE O/P NEW HI 60 MIN: CPT | Performed by: INTERNAL MEDICINE

## 2023-04-14 ASSESSMENT — ANXIETY QUESTIONNAIRES
3. WORRYING TOO MUCH ABOUT DIFFERENT THINGS: NOT AT ALL
1. FEELING NERVOUS, ANXIOUS, OR ON EDGE: NOT AT ALL
7. FEELING AFRAID AS IF SOMETHING AWFUL MIGHT HAPPEN: NOT AT ALL
2. NOT BEING ABLE TO STOP OR CONTROL WORRYING: NOT AT ALL
GAD7 TOTAL SCORE: 0
GAD7 TOTAL SCORE: 0
5. BEING SO RESTLESS THAT IT IS HARD TO SIT STILL: NOT AT ALL
6. BECOMING EASILY ANNOYED OR IRRITABLE: NOT AT ALL

## 2023-04-14 ASSESSMENT — PATIENT HEALTH QUESTIONNAIRE - PHQ9
SUM OF ALL RESPONSES TO PHQ QUESTIONS 1-9: 0
5. POOR APPETITE OR OVEREATING: NOT AT ALL

## 2023-04-14 ASSESSMENT — PAIN SCALES - GENERAL: PAINLEVEL: NO PAIN (0)

## 2023-04-14 NOTE — PROGRESS NOTES
MEDICAL ONCOLOGY CONSULT NOTE  Apr 14, 2023    REASON FOR REFERRAL: Malignant Melanoma.     Referring Provider: Dr. Biggs    HISTORY OF PRESENT ILLNESS  Justino Wang is a 77 year old male with PMH as stated below who is the oncology clinic for consultation    His history in short is as follows    Mr. Wang states his wife had noticed a mole in his lower back which had changed in size and color over the last year. He was referred to surgery by his PCP.     1/30/2023: Excision biopsy: superficial spreading melanoma. Maximum tumor thickness 2.8 mm. Ulceration not present. All margins negative for invasive melanoma. Melanoma in situ present. At margin pT3a    2/22/2023: wide local excision and sentinel lymph node biopsy: Biopsy site change consistent with prior procedure.Negative for malignancy in 1 lymph node (0/1)    Mr. Wang is doing well. Denies any swelling in his back or leg. Denies any weight loss, appetite loss, fatigue, pain anywhere else, headache or dizziness.     REVIEW OF SYSTEMS  A 12-point ROS negative except as in HPI      Current Outpatient Medications   Medication Sig Dispense Refill     acetaminophen (TYLENOL) 500 MG tablet Take 500-1,000 mg by mouth 4 times daily       atorvastatin (LIPITOR) 80 MG tablet Take 1 tablet (80 mg) by mouth daily (with dinner) 90 tablet 4     levothyroxine (SYNTHROID/LEVOTHROID) 100 MCG tablet Take 1 tablet (100 mcg) by mouth every morning (before breakfast) Take 1 tablet by mouth before breakfast. 90 tablet 4     aspirin EC 81 MG EC tablet Take 81 mg by mouth daily with food Take 1 tablet by mouth once daily with a meal. (Patient not taking: Reported on 3/9/2023)       SENNA-docusate sodium (SENNA S) 8.6-50 MG tablet Take 1 tablet by mouth as needed (take with narcotic) (Patient not taking: Reported on 2/27/2023) 20 tablet 0       No Known Allergies  Immunization History   Administered Date(s) Administered     COVID-19 Vaccine 12+ (Pfizer 2022) 05/10/2022      COVID-19 Vaccine 12+ (Pfizer) 2021, 2021, 10/11/2021     COVID-19 Vaccine Bivalent Booster 12+ (Pfizer) 10/17/2022     Flu 65+ Years 10/17/2022     Influenza Vaccine 65+ (Fluzone HD) 10/22/2020, 2022     Pneumo Conj 13-V (2010&after) 2015     Pneumococcal 23 valent 2009     TD,PF 7+ (Tenivac) 2009     TDAP Vaccine (Boostrix) 10/24/2012     Tdap (Adult) Unspecified Formulation 2000     Zoster vaccine, live 10/24/2012       Past Medical History:   Diagnosis Date     Atrial fibrillation (H) 2002    cardioversion      Benign neoplasm of colon     2016     Elevated coronary artery calcium score 11/15/2017     Postprocedural hypothyroidism     No Comments Provided     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     I131 ablation 2002     Tubular adenoma of colon 2016     Zoster without complications     1982,right facial       Past Surgical History:   Procedure Laterality Date     C THYROID ABLATION       CLOSED RX ELBOW DISLOCATION  2018    Dr Reich     COLONOSCOPY  2016    Dr Rosa     COLONOSCOPY  2021    normal     EXCISE LESION (LOCATION) Left 2023    Procedure: WIDE LOCAL EXCISION OF BACK LESION, FOR MELANOMA , BIOPSY, LYMPH NODE, SENTINEL;  Surgeon: David Biggs MD;  Location: [facility] OR     VASECTOMY      No Comments Provided       SOCIAL HISTORY  History   Smoking Status     Never   Smokeless Tobacco     Never    Social History    Substance and Sexual Activity      Alcohol use: Yes        Alcohol/week: 12.5 standard drinks of alcohol        Comment: 1 shot and 1 glass of wine daily.     History   Drug Use No       FAMILY HISTORY  Family History   Problem Relation Age of Onset     Other - See Comments Mother         natural  92     Diabetes Mother         Diabetes     Other - See Comments Father         pancreatic cancer  68     Heart Disease Father 64        Heart Disease,MI age 64       PHYSICAL  "EXAMINATION  /80 (BP Location: Right arm, Patient Position: Sitting, Cuff Size: Adult Large)   Pulse 60   Temp 97.4  F (36.3  C)   Resp 24   Ht 1.727 m (5' 8\")   Wt 101.8 kg (224 lb 6 oz)   SpO2 91%   BMI 34.12 kg/m    Wt Readings from Last 2 Encounters:   04/14/23 101.8 kg (224 lb 6 oz)   03/09/23 102.1 kg (225 lb)     Physical Exam  Constitutional:       Appearance: Normal appearance.   Eyes:      Pupils: Pupils are equal, round, and reactive to light.   Pulmonary:      Effort: Pulmonary effort is normal.      Breath sounds: Normal breath sounds.   Abdominal:      Palpations: Abdomen is soft.   Skin:     Comments: Healed incision in the right lower back.    Neurological:      General: No focal deficit present.      Mental Status: He is alert and oriented to person, place, and time.       Laboratory and Imaging     Latest Reference Range & Units 01/09/23 11:49   Sodium 136 - 145 mmol/L 140   Potassium 3.4 - 5.3 mmol/L 4.3   Chloride 98 - 107 mmol/L 105   Creatinine 0.67 - 1.17 mg/dL 0.74   Bilirubin Total <=1.2 mg/dL 0.8     ASSESSMENT AND PLAN    1. Malignant Melanoma Stage IIA pT3aN0    S/p wide local excision and SLNB on 2/2023    Mr. Wang is doing well. Discussed today pathology and staging. As this is stage IIA he does not require any additional adjuvant therapy.    Discussed I would recommend referral to Dermatology for skin surveillance. No indication for imaging as no concern for distant disease.     Discussed follow up with oncology and he would like to follow up as needed.     Total time spent on the patient on day of encounter was 60 minutes doing chart review, review of test results, interpretation of results, patient visit and documentation.        "

## 2023-04-19 ENCOUNTER — TRANSFERRED RECORDS (OUTPATIENT)
Dept: HEALTH INFORMATION MANAGEMENT | Facility: OTHER | Age: 78
End: 2023-04-19
Payer: MEDICARE

## 2023-09-13 ENCOUNTER — OFFICE VISIT (OUTPATIENT)
Dept: ORTHOPEDICS | Facility: OTHER | Age: 78
End: 2023-09-13
Attending: ORTHOPAEDIC SURGERY
Payer: MEDICARE

## 2023-09-13 DIAGNOSIS — M16.11 ARTHRITIS OF RIGHT HIP: Primary | ICD-10-CM

## 2023-09-13 PROCEDURE — G0463 HOSPITAL OUTPT CLINIC VISIT: HCPCS

## 2023-09-13 PROCEDURE — 99213 OFFICE O/P EST LOW 20 MIN: CPT | Performed by: ORTHOPAEDIC SURGERY

## 2023-09-13 NOTE — PROGRESS NOTES
Surgical Clinic Consult  Primary physician:     Too Wild    Chief complaint:   Right hip pain    History of present illness:  This is a 78 year old male I am seeing in consultation for right hip pain which has been chronic in nature.  Patient was last seen by myself in March 2022.  Patient reports progression of overall pain and discomfort.  Patient is here to work on getting scheduled for his replacement.  Patient reports pain with activity and better with rest.  X-rays done in January 2022 reveal severe arthrosis with deformity changes in the femoral head.    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     I131 ablation 2002    Tubular adenoma of colon 1/11/2016    Zoster without complications     1982,right facial       Pastsurgical history:  Past Surgical History:   Procedure Laterality Date    C THYROID ABLATION      CLOSED RX ELBOW DISLOCATION  07/2018    Dr Reich    COLONOSCOPY  11/18/2016    Dr Rosa    COLONOSCOPY  05/06/2021    normal    EXCISE LESION (LOCATION) Left 2/22/2023    Procedure: WIDE LOCAL EXCISION OF BACK LESION, FOR MELANOMA , BIOPSY, LYMPH NODE, SENTINEL;  Surgeon: David Biggs MD;  Location: [facility]    VASECTOMY      No Comments Provided       Current medications:  Current Outpatient Medications   Medication Sig Dispense Refill    acetaminophen (TYLENOL) 500 MG tablet Take 500-1,000 mg by mouth 4 times daily      aspirin EC 81 MG EC tablet Take 81 mg by mouth daily with food Take 1 tablet by mouth once daily with a meal. (Patient not taking: Reported on 3/9/2023)      atorvastatin (LIPITOR) 80 MG tablet Take 1 tablet (80 mg) by mouth daily (with dinner) 90 tablet 4    levothyroxine  (SYNTHROID/LEVOTHROID) 100 MCG tablet Take 1 tablet (100 mcg) by mouth every morning (before breakfast) Take 1 tablet by mouth before breakfast. 90 tablet 4    SENNA-docusate sodium (SENNA S) 8.6-50 MG tablet Take 1 tablet by mouth as needed (take with narcotic) (Patient not taking: Reported on 2023) 20 tablet 0       Allergies:  No Known Allergies    Family history:  Family History   Problem Relation Age of Onset    Other - See Comments Mother         natural  92    Diabetes Mother         Diabetes    Other - See Comments Father         pancreatic cancer  68    Heart Disease Father 64        Heart Disease,MI age 64       Social history:  Social History     Socioeconomic History    Marital status:      Spouse name: Not on file    Number of children: Not on file    Years of education: Not on file    Highest education level: Not on file   Occupational History    Not on file   Tobacco Use    Smoking status: Never    Smokeless tobacco: Never   Vaping Use    Vaping Use: Never used   Substance and Sexual Activity    Alcohol use: Yes     Alcohol/week: 12.5 standard drinks of alcohol     Comment: 1 shot and 1 glass of wine daily.    Drug use: No    Sexual activity: Yes     Partners: Female   Other Topics Concern    Not on file   Social History Narrative    . Wife Mary. . Owns [employer]. 3 children, 1 with Down Syndrome     Social Determinants of Health     Financial Resource Strain: Not on file   Food Insecurity: Not on file   Transportation Needs: Not on file   Physical Activity: Not on file   Stress: Not on file   Social Connections: Not on file   Intimate Partner Violence: Not on file   Housing Stability: Not on file       PROBLEM LIST:  Patient Active Problem List   Diagnosis    Actinic keratosis of scalp    Elevated coronary artery calcium score    Graves' disease    Hypothyroidism following radioiodine therapy    Seborrheic keratoses    Tubular adenoma of colon    ACP  (advance care planning)    1st degree AV block       Review of Systems:  COMPLETE 12 point REVIEW OF SYSTEMS is otherwise negative with the exception of which is stated above.    Physical exam: There were no vitals taken for this visit.    General: this is a pleasant male patient in no acute distress.  Patient is awake alert and oriented x3 .   EXAM:  Chest/Respiratory Exam: Normal - Clear to auscultation without rales, rhonchi, or wheezing.  Cardiovascular Exam: normal  Musculoskeletal: Right hip examination shows flexion to 90 internal rotation of 0.  External rotation of 10.  No pain with palpation across hip bursa.  BALBINA testing is negative.    Imaging: X-rays right hip from January 2022 reveal severe end-stage osteoarthrosis with femoral head flattening    Assessment:   Right hip end-stage osteoarthrosis    Plan:    Right total hip replacement direct anterior to be scheduled.  We will plan for surgery here either later October or early November for him.  Aspirin for DVT prophylaxis.  Brief course of physical therapy advised postsurgery as well.      Terry Reich MD

## 2023-09-14 ENCOUNTER — TELEPHONE (OUTPATIENT)
Dept: ORTHOPEDICS | Facility: OTHER | Age: 78
End: 2023-09-14
Payer: MEDICARE

## 2023-09-14 DIAGNOSIS — M16.11 ARTHRITIS OF RIGHT HIP: Primary | ICD-10-CM

## 2023-10-23 ENCOUNTER — OFFICE VISIT (OUTPATIENT)
Dept: INTERNAL MEDICINE | Facility: OTHER | Age: 78
End: 2023-10-23
Payer: MEDICARE

## 2023-10-23 VITALS
DIASTOLIC BLOOD PRESSURE: 88 MMHG | HEIGHT: 68 IN | WEIGHT: 227 LBS | RESPIRATION RATE: 18 BRPM | TEMPERATURE: 96.9 F | HEART RATE: 59 BPM | OXYGEN SATURATION: 98 % | SYSTOLIC BLOOD PRESSURE: 122 MMHG | BODY MASS INDEX: 34.4 KG/M2

## 2023-10-23 DIAGNOSIS — N40.1 BENIGN PROSTATIC HYPERPLASIA WITH URINARY FREQUENCY: ICD-10-CM

## 2023-10-23 DIAGNOSIS — E89.0 HYPOTHYROIDISM FOLLOWING RADIOIODINE THERAPY: ICD-10-CM

## 2023-10-23 DIAGNOSIS — I44.0 1ST DEGREE AV BLOCK: ICD-10-CM

## 2023-10-23 DIAGNOSIS — M16.10 HIP ARTHRITIS: ICD-10-CM

## 2023-10-23 DIAGNOSIS — R35.0 BENIGN PROSTATIC HYPERPLASIA WITH URINARY FREQUENCY: ICD-10-CM

## 2023-10-23 DIAGNOSIS — Z13.220 LIPID SCREENING: ICD-10-CM

## 2023-10-23 DIAGNOSIS — R93.1 ELEVATED CORONARY ARTERY CALCIUM SCORE: ICD-10-CM

## 2023-10-23 DIAGNOSIS — Z01.818 PREOP GENERAL PHYSICAL EXAM: Primary | ICD-10-CM

## 2023-10-23 LAB
ALBUMIN SERPL BCG-MCNC: 4.3 G/DL (ref 3.5–5.2)
ALP SERPL-CCNC: 102 U/L (ref 40–129)
ALT SERPL W P-5'-P-CCNC: 13 U/L (ref 0–70)
ANION GAP SERPL CALCULATED.3IONS-SCNC: 10 MMOL/L (ref 7–15)
AST SERPL W P-5'-P-CCNC: 21 U/L (ref 0–45)
BASOPHILS # BLD AUTO: 0.1 10E3/UL (ref 0–0.2)
BASOPHILS NFR BLD AUTO: 1 %
BILIRUB SERPL-MCNC: 0.7 MG/DL
BUN SERPL-MCNC: 13.7 MG/DL (ref 8–23)
CALCIUM SERPL-MCNC: 9.3 MG/DL (ref 8.8–10.2)
CHLORIDE SERPL-SCNC: 105 MMOL/L (ref 98–107)
CHOLEST SERPL-MCNC: 151 MG/DL
CREAT SERPL-MCNC: 0.84 MG/DL (ref 0.67–1.17)
DEPRECATED HCO3 PLAS-SCNC: 24 MMOL/L (ref 22–29)
EGFRCR SERPLBLD CKD-EPI 2021: 88 ML/MIN/1.73M2
EOSINOPHIL # BLD AUTO: 0.2 10E3/UL (ref 0–0.7)
EOSINOPHIL NFR BLD AUTO: 3 %
ERYTHROCYTE [DISTWIDTH] IN BLOOD BY AUTOMATED COUNT: 12.3 % (ref 10–15)
GLUCOSE SERPL-MCNC: 103 MG/DL (ref 70–99)
HCT VFR BLD AUTO: 40.7 % (ref 40–53)
HDLC SERPL-MCNC: 65 MG/DL
HGB BLD-MCNC: 14 G/DL (ref 13.3–17.7)
HOLD SPECIMEN: NORMAL
IMM GRANULOCYTES # BLD: 0 10E3/UL
IMM GRANULOCYTES NFR BLD: 0 %
LDLC SERPL CALC-MCNC: 69 MG/DL
LYMPHOCYTES # BLD AUTO: 1.6 10E3/UL (ref 0.8–5.3)
LYMPHOCYTES NFR BLD AUTO: 27 %
MCH RBC QN AUTO: 33.4 PG (ref 26.5–33)
MCHC RBC AUTO-ENTMCNC: 34.4 G/DL (ref 31.5–36.5)
MCV RBC AUTO: 97 FL (ref 78–100)
MONOCYTES # BLD AUTO: 0.5 10E3/UL (ref 0–1.3)
MONOCYTES NFR BLD AUTO: 8 %
NEUTROPHILS # BLD AUTO: 3.6 10E3/UL (ref 1.6–8.3)
NEUTROPHILS NFR BLD AUTO: 61 %
NONHDLC SERPL-MCNC: 86 MG/DL
NRBC # BLD AUTO: 0 10E3/UL
NRBC BLD AUTO-RTO: 0 /100
PLATELET # BLD AUTO: 265 10E3/UL (ref 150–450)
POTASSIUM SERPL-SCNC: 4.2 MMOL/L (ref 3.4–5.3)
PROT SERPL-MCNC: 7.2 G/DL (ref 6.4–8.3)
RBC # BLD AUTO: 4.19 10E6/UL (ref 4.4–5.9)
SODIUM SERPL-SCNC: 139 MMOL/L (ref 135–145)
TRIGL SERPL-MCNC: 83 MG/DL
TSH SERPL DL<=0.005 MIU/L-ACNC: 2.4 UIU/ML (ref 0.3–4.2)
WBC # BLD AUTO: 6 10E3/UL (ref 4–11)

## 2023-10-23 PROCEDURE — 80061 LIPID PANEL: CPT | Mod: ZL

## 2023-10-23 PROCEDURE — 93005 ELECTROCARDIOGRAM TRACING: CPT

## 2023-10-23 PROCEDURE — 93010 ELECTROCARDIOGRAM REPORT: CPT | Performed by: INTERNAL MEDICINE

## 2023-10-23 PROCEDURE — G0463 HOSPITAL OUTPT CLINIC VISIT: HCPCS | Mod: 25

## 2023-10-23 PROCEDURE — 36415 COLL VENOUS BLD VENIPUNCTURE: CPT | Mod: ZL

## 2023-10-23 PROCEDURE — G0463 HOSPITAL OUTPT CLINIC VISIT: HCPCS

## 2023-10-23 PROCEDURE — 85025 COMPLETE CBC W/AUTO DIFF WBC: CPT | Mod: ZL

## 2023-10-23 PROCEDURE — 99214 OFFICE O/P EST MOD 30 MIN: CPT

## 2023-10-23 PROCEDURE — 80053 COMPREHEN METABOLIC PANEL: CPT | Mod: ZL

## 2023-10-23 PROCEDURE — 84443 ASSAY THYROID STIM HORMONE: CPT | Mod: ZL

## 2023-10-23 RX ORDER — TAMSULOSIN HYDROCHLORIDE 0.4 MG/1
0.4 CAPSULE ORAL DAILY
Qty: 90 CAPSULE | Refills: 0 | Status: ON HOLD | OUTPATIENT
Start: 2023-10-23 | End: 2023-11-08

## 2023-10-23 ASSESSMENT — PAIN SCALES - GENERAL: PAINLEVEL: MODERATE PAIN (5)

## 2023-10-23 NOTE — H&P (VIEW-ONLY)
[facility]  [address]  [place] [address]  Phone: [phone]  Primary Provider: Too Wild  Pre-op Performing Provider: ETELVINA STEIN      PREOPERATIVE EVALUATION:  Today's date: 10/23/2023    Alvaro is a 78 year old male who presents for a preoperative evaluation.      Surgical Information:  Surgery/Procedure: Right Hip Replacement  Surgery Location: [facility]  Surgeon: Dr. Reich  Surgery Date: 11-7-23  Time of Surgery: TBD  Where patient plans to recover: At home with family  Fax number for surgical facility: Note does not need to be faxed, will be available electronically in Epic.    Assessment & Plan     The proposed surgical procedure is considered INTERMEDIATE risk.      ICD-10-CM    1. Preop general physical exam  Z01.818 CBC with Platelets & Differential ([facility] Only)     TSH with free T4 reflex     Comprehensive Metabolic Panel     EKG 12-lead, tracing only     CBC with Platelets & Differential ([facility] Only)     TSH with free T4 reflex     Comprehensive Metabolic Panel      2. Right Hip Arthritis  M16.10       3. Elevated coronary artery calcium score  R93.1 EKG 12-lead, tracing only      4. Benign prostatic hyperplasia with urinary frequency  N40.1 tamsulosin (FLOMAX) 0.4 MG capsule    R35.0       5. Hypothyroidism following radioiodine therapy  E89.0       6. 1st degree AV block  I44.0       7. Lipid screening  Z13.220 Lipid Panel     Lipid Panel           Risks and Recommendations:  The patient has the following additional risks and recommendations for perioperative complications:   - No identified additional risk factors other than previously addressed    Antiplatelet or Anticoagulation Medication Instructions:   - aspirin: Discontinue aspirin 7-10 days prior to procedure to reduce bleeding risk. It should be resumed postoperatively.     Additional Medication Instructions:  Patient is to take all scheduled medications on the day of  surgery    RECOMMENDATION:  APPROVAL GIVEN to proceed with proposed procedure, without further diagnostic evaluation.    Subjective       HPI related to upcoming procedure:      Patient presents to clinic for preoperative evaluation for right total hip arthroplasty scheduled on 11/7/2023 by Dr. Reich.  Patient has had continued ongoing pain in his right hip with history of severe arthritis and deformity changes in the femoral head.  He is looking forward to upcoming procedure as his pain has become increasingly more bothersome.  Patient denies having any difficulties with anesthesia in the past, and is able to walk up stairs without feeling short of breath.         10/22/2023     9:20 AM   Preop Questions   1. Have you ever had a heart attack or stroke? No   2. Have you ever had surgery on your heart or blood vessels, such as a stent placement, a coronary artery bypass, or surgery on an artery in your head, neck, heart, or legs? No   3. Do you have chest pain with activity? No   4. Do you have a history of  heart failure? No   5. Do you currently have a cold, bronchitis or symptoms of other infection? No   6. Do you have a cough, shortness of breath, or wheezing? No   7. Do you or anyone in your family have previous history of blood clots? Family History Unknown patient does not have history of this personally   8. Do you or does anyone in your family have a serious bleeding problem such as prolonged bleeding following surgeries or cuts? Family History Unknown patient does not have history of this personally   9. Have you ever had problems with anemia or been told to take iron pills? No   10. Have you had any abnormal blood loss such as black, tarry or bloody stools? No   11. Have you ever had a blood transfusion? No   12. Are you willing to have a blood transfusion if it is medically needed before, during, or after your surgery? Yes   13. Have you or any of your relatives ever had problems with anesthesia? No    14. Do you have sleep apnea, excessive snoring or daytime drowsiness? No   15. Do you have any artifical heart valves or other implanted medical devices like a pacemaker, defibrillator, or continuous glucose monitor? No   16. Do you have artificial joints? No   17. Are you allergic to latex? No       Health Care Directive:  Patient has a Health Care Directive on file      Preoperative Review of :   reviewed - no record of controlled substances prescribed.      Status of Chronic Conditions:  HYPERLIPIDEMIA - Patient has a long history of significant Hyperlipidemia requiring medication for treatment with recent good control. Patient reports no problems or side effects with the medication.  He does have a history of elevated coronary artery calcium score.  Denies any chest pain/pressure.  He is currently on a statin and baby aspirin.    HYPOTHYROIDISM - Patient has a longstanding history of chronic Hypothyroidism secondary to thyroid ablation.  Patient has been doing well, noting no tremor, insomnia, hair loss or changes in skin texture. Continues to take medications as directed, without adverse reactions or side effects. Last TSH   Lab Results   Component Value Date    TSH 2.40 10/23/2023   .      Review of Systems   Constitutional:  Negative for chills and fever.   Respiratory:  Negative for cough, shortness of breath and wheezing.    Cardiovascular:  Negative for chest pain, palpitations and peripheral edema.   Gastrointestinal:  Negative for abdominal pain.   Genitourinary:  Negative for dysuria.   Musculoskeletal:  Positive for arthralgias (Right hip).   Skin:  Negative for rash.   Neurological:  Negative for dizziness and light-headedness.       Patient Active Problem List    Diagnosis Date Noted    1st degree AV block 12/01/2020     Priority: Medium    ACP (advance care planning) 02/14/2018     Priority: Medium    Graves' disease 01/19/2018     Priority: Medium     Overview:   I131 ablation 2002       Hypothyroidism following radioiodine therapy 01/19/2018     Priority: Medium    Elevated coronary artery calcium score 11/15/2017     Priority: Medium    Actinic keratosis of scalp 11/09/2016     Priority: Medium    Seborrheic keratoses 11/09/2016     Priority: Medium    Tubular adenoma of colon 01/11/2016     Priority: Medium      Past Medical History:   Diagnosis Date    Atrial fibrillation (H) 11/2002    cardioversion 11/02    Benign neoplasm of colon     1/11/2016    Elevated coronary artery calcium score 11/15/2017    Postprocedural hypothyroidism     No Comments Provided    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     I131 ablation 2002    Tubular adenoma of colon 1/11/2016    Zoster without complications     1982,right facial     Past Surgical History:   Procedure Laterality Date    C THYROID ABLATION      CLOSED RX ELBOW DISLOCATION  07/2018    Dr Reich    COLONOSCOPY  11/18/2016    Dr Rosa    COLONOSCOPY  05/06/2021    normal    EXCISE LESION (LOCATION) Left 2/22/2023    Procedure: WIDE LOCAL EXCISION OF BACK LESION, FOR MELANOMA , BIOPSY, LYMPH NODE, SENTINEL;  Surgeon: David Biggs MD;  Location: [facility]    VASECTOMY      No Comments Provided     Current Outpatient Medications   Medication Sig Dispense Refill    acetaminophen (TYLENOL) 500 MG tablet Take 500-1,000 mg by mouth 4 times daily      aspirin EC 81 MG EC tablet Take 81 mg by mouth daily with food Take 1 tablet by mouth once daily with a meal.      atorvastatin (LIPITOR) 80 MG tablet Take 1 tablet (80 mg) by mouth daily (with dinner) 90 tablet 4    levothyroxine (SYNTHROID/LEVOTHROID) 100 MCG tablet Take 1 tablet (100 mcg) by mouth every morning (before breakfast) Take 1 tablet by mouth before breakfast. 90 tablet 4    tamsulosin (FLOMAX) 0.4 MG capsule Take 1 capsule (0.4 mg) by mouth daily 90 capsule 0       No Known Allergies     Social History     Tobacco Use    Smoking status: Never    Smokeless tobacco: Never  "  Substance Use Topics    Alcohol use: Yes     Alcohol/week: 12.5 standard drinks of alcohol     Comment: 1 shot and 1 glass of wine daily.       History   Drug Use No         Objective     /88 (BP Location: Right arm, Patient Position: Chair, Cuff Size: Adult Large)   Pulse 59   Temp 96.9  F (36.1  C) (Temporal)   Resp 18   Ht 1.727 m (5' 8\")   Wt 103 kg (227 lb)   SpO2 98%   BMI 34.52 kg/m      Physical Exam  Constitutional:       General: He is not in acute distress.     Appearance: He is well-developed. He is obese.   HENT:      Head: Normocephalic and atraumatic.      Nose: Nose normal.      Mouth/Throat:      Pharynx: No oropharyngeal exudate.   Eyes:      General: No scleral icterus.     Conjunctiva/sclera: Conjunctivae normal.      Pupils: Pupils are equal, round, and reactive to light.   Neck:      Thyroid: No thyromegaly.   Cardiovascular:      Rate and Rhythm: Normal rate and regular rhythm.      Heart sounds: No murmur heard.  Pulmonary:      Effort: Pulmonary effort is normal. No respiratory distress.      Breath sounds: Normal breath sounds. No wheezing.   Musculoskeletal:         General: No tenderness or deformity. Normal range of motion.      Cervical back: Normal range of motion and neck supple.   Skin:     General: Skin is warm and dry.      Findings: No rash.   Neurological:      Mental Status: He is alert and oriented to person, place, and time.      Cranial Nerves: No cranial nerve deficit.      Coordination: Coordination normal.   Psychiatric:         Behavior: Behavior normal.         Thought Content: Thought content normal.         Judgment: Judgment normal.         Diagnostics:  Recent Results (from the past 48 hour(s))   EKG 12-lead, tracing only    Collection Time: 10/23/23 11:51 AM   Result Value Ref Range    Systolic Blood Pressure  mmHg    Diastolic Blood Pressure  mmHg    Ventricular Rate 54 BPM    Atrial Rate 54 BPM    OK Interval 272 ms    QRS Duration 78 ms     " ms    QTc 455 ms    P Axis 40 degrees    R AXIS 5 degrees    T Axis 50 degrees    Interpretation ECG       Sinus bradycardia with 1st degree A-V block  Otherwise normal ECG  When compared with ECG of 30-NOV-2020 11:25,  No significant change was found     TSH with free T4 reflex    Collection Time: 10/23/23 12:00 PM   Result Value Ref Range    TSH 2.40 0.30 - 4.20 uIU/mL   Comprehensive Metabolic Panel    Collection Time: 10/23/23 12:00 PM   Result Value Ref Range    Sodium 139 135 - 145 mmol/L    Potassium 4.2 3.4 - 5.3 mmol/L    Carbon Dioxide (CO2) 24 22 - 29 mmol/L    Anion Gap 10 7 - 15 mmol/L    Urea Nitrogen 13.7 8.0 - 23.0 mg/dL    Creatinine 0.84 0.67 - 1.17 mg/dL    GFR Estimate 88 >60 mL/min/1.73m2    Calcium 9.3 8.8 - 10.2 mg/dL    Chloride 105 98 - 107 mmol/L    Glucose 103 (H) 70 - 99 mg/dL    Alkaline Phosphatase 102 40 - 129 U/L    AST 21 0 - 45 U/L    ALT 13 0 - 70 U/L    Protein Total 7.2 6.4 - 8.3 g/dL    Albumin 4.3 3.5 - 5.2 g/dL    Bilirubin Total 0.7 <=1.2 mg/dL   Lipid Panel    Collection Time: 10/23/23 12:00 PM   Result Value Ref Range    Cholesterol 151 <200 mg/dL    Triglycerides 83 <150 mg/dL    Direct Measure HDL 65 >=40 mg/dL    LDL Cholesterol Calculated 69 <=100 mg/dL    Non HDL Cholesterol 86 <130 mg/dL   CBC with platelets and differential    Collection Time: 10/23/23 12:00 PM   Result Value Ref Range    WBC Count 6.0 4.0 - 11.0 10e3/uL    RBC Count 4.19 (L) 4.40 - 5.90 10e6/uL    Hemoglobin 14.0 13.3 - 17.7 g/dL    Hematocrit 40.7 40.0 - 53.0 %    MCV 97 78 - 100 fL    MCH 33.4 (H) 26.5 - 33.0 pg    MCHC 34.4 31.5 - 36.5 g/dL    RDW 12.3 10.0 - 15.0 %    Platelet Count 265 150 - 450 10e3/uL    % Neutrophils 61 %    % Lymphocytes 27 %    % Monocytes 8 %    % Eosinophils 3 %    % Basophils 1 %    % Immature Granulocytes 0 %    NRBCs per 100 WBC 0 <1 /100    Absolute Neutrophils 3.6 1.6 - 8.3 10e3/uL    Absolute Lymphocytes 1.6 0.8 - 5.3 10e3/uL    Absolute Monocytes 0.5 0.0 - 1.3  10e3/uL    Absolute Eosinophils 0.2 0.0 - 0.7 10e3/uL    Absolute Basophils 0.1 0.0 - 0.2 10e3/uL    Absolute Immature Granulocytes 0.0 <=0.4 10e3/uL    Absolute NRBCs 0.0 10e3/uL   Extra Serum Separator Tube (SST)    Collection Time: 10/23/23 12:00 PM   Result Value Ref Range    Hold Specimen JIC       EKG: Sinus bradycardia with 1st degree AV block, unchanged from previous tracings    Revised Cardiac Risk Index (RCRI):  The patient has the following serious cardiovascular risks for perioperative complications:   - No serious cardiac risks = 0 points     RCRI Interpretation: 0 points: Class I (very low risk - 0.4% complication rate)       Signed Electronically by: CRISTIAN Acosta CNP  Copy of this evaluation report is provided to requesting physician.

## 2023-10-23 NOTE — NURSING NOTE
"Chief Complaint   Patient presents with    Pre-Op Exam     11/7/23 Procedure-Right Hip       Initial /88 (BP Location: Right arm, Patient Position: Chair, Cuff Size: Adult Large)   Pulse 59   Temp 96.9  F (36.1  C) (Temporal)   Resp 18   Ht 1.727 m (5' 8\")   Wt 103 kg (227 lb)   SpO2 98%   BMI 34.52 kg/m   Estimated body mass index is 34.52 kg/m  as calculated from the following:    Height as of this encounter: 1.727 m (5' 8\").    Weight as of this encounter: 103 kg (227 lb).    FOOD SECURITY SCREENING QUESTIONS:    The next two questions are to help us understand your food security.  If you are feeling you need any assistance in this area, we have resources available to support you today.    Hunger Vital Signs:  Within the past 12 months we worried whether our food would run out before we got money to buy more. Never  Within the past 12 months the food we bought just didn't last and we didn't have money to get more. Never    Medication Reconciliation: Complete.       Anitra Thompson LPN on 10/23/2023 at 11:13 AM     "

## 2023-10-23 NOTE — PATIENT INSTRUCTIONS
Preparing for Your Surgery  Getting started  A nurse will call you to review your health history and instructions. They will give you an arrival time based on your scheduled surgery time. Please be ready to share:  Your doctor's clinic name and phone number  Your medical, surgical, and anesthesia history  A list of allergies and sensitivities  A list of medicines, including herbal treatments and over-the-counter drugs  Whether the patient has a legal guardian (ask how to send us the papers in advance)  Please tell us if you're pregnant--or if there's any chance you might be pregnant. Some surgeries may injure a fetus (unborn baby), so they require a pregnancy test. Surgeries that are safe for a fetus don't always need a test, and you can choose whether to have one.   If you have a child who's having surgery, please ask for a copy of Preparing for Your Child's Surgery.    Preparing for surgery  Within 10 to 30 days of surgery: Have a pre-op exam (sometimes called an H&P, or History and Physical). This can be done at a clinic or pre-operative center.  If you're having a , you may not need this exam. Talk to your care team.  At your pre-op exam, talk to your care team about all medicines you take. If you need to stop any medicines before surgery, ask when to start taking them again.  We do this for your safety. Many medicines can make you bleed too much during surgery. Some change how well surgery (anesthesia) drugs work.  Call your insurance company to let them know you're having surgery. (If you don't have insurance, call [phone].)  Call your clinic if there's any change in your health. This includes signs of a cold or flu (sore throat, runny nose, cough, rash, fever). It also includes a scrape or scratch near the surgery site.  If you have questions on the day of surgery, call your hospital or surgery center.  Eating and drinking guidelines  For your safety: Unless your surgeon tells you otherwise,  follow the guidelines below.  Eat and drink as usual until 8 hours before you arrive for surgery. After that, no food or milk.  Drink clear liquids until 2 hours before you arrive. These are liquids you can see through, like water, Gatorade, and Propel Water. They also include plain black coffee and tea (no cream or milk), candy, and breath mints. You can spit out gum when you arrive.  If you drink alcohol: Stop drinking it the night before surgery.  If your care team tells you to take medicine on the morning of surgery, it's okay to take it with a sip of water.  Preventing infection  Shower or bathe the night before and morning of your surgery. Follow the instructions your clinic gave you. (If no instructions, use regular soap.)  Don't shave or clip hair near your surgery site. We'll remove the hair if needed.  Don't smoke or vape the morning of surgery. You may chew nicotine gum up to 2 hours before surgery. A nicotine patch is okay.  Note: Some surgeries require you to completely quit smoking and nicotine. Check with your surgeon.  Your care team will make every effort to keep you safe from infection. We will:  Clean our hands often with soap and water (or an alcohol-based hand rub).  Clean the skin at your surgery site with a special soap that kills germs.  Give you a special gown to keep you warm. (Cold raises the risk of infection.)  Wear special hair covers, masks, gowns and gloves during surgery.  Give antibiotic medicine, if prescribed. Not all surgeries need antibiotics.  What to bring on the day of surgery  Photo ID and insurance card  Copy of your health care directive, if you have one  Glasses and hearing aids (bring cases)  You can't wear contacts during surgery  Inhaler and eye drops, if you use them (tell us about these when you arrive)  CPAP machine or breathing device, if you use them  A few personal items, if spending the night  If you have . . .  A pacemaker, ICD (cardiac defibrillator) or other  implant: Bring the ID card.  An implanted stimulator: Bring the remote control.  A legal guardian: Bring a copy of the certified (court-stamped) guardianship papers.  Please remove any jewelry, including body piercings. Leave jewelry and other valuables at home.  If you're going home the day of surgery  You must have a responsible adult drive you home. They should stay with you overnight as well.  If you don't have someone to stay with you, and you aren't safe to go home alone, we may keep you overnight. Insurance often won't pay for this.  After surgery  If it's hard to control your pain or you need more pain medicine, please call your surgeon's office.  Questions?   If you have any questions for your care team, list them here: _________________________________________________________________________________________________________________________________________________________________________ ____________________________________ ____________________________________ ____________________________________  For informational purposes only. Not to replace the advice of your health care provider. Copyright   2003, 2019 [employer]. All rights reserved. Clinically reviewed by Cristin Mcnally MD. [employer] 035775 - REV 12/22.    How to Take Your Medication Before Surgery  - HOLD (do not take) Aspirin for 7 days

## 2023-10-23 NOTE — PROGRESS NOTES
[facility]  [address]  [place] [address]  Phone: [phone]  Primary Provider: Too Wild  Pre-op Performing Provider: ETELVINA STEIN      PREOPERATIVE EVALUATION:  Today's date: 10/23/2023    Alvaro is a 78 year old male who presents for a preoperative evaluation.      Surgical Information:  Surgery/Procedure: Right Hip Replacement  Surgery Location: [facility]  Surgeon: Dr. Reich  Surgery Date: 11-7-23  Time of Surgery: TBD  Where patient plans to recover: At home with family  Fax number for surgical facility: Note does not need to be faxed, will be available electronically in Epic.    Assessment & Plan     The proposed surgical procedure is considered INTERMEDIATE risk.      ICD-10-CM    1. Preop general physical exam  Z01.818 CBC with Platelets & Differential ([facility] Only)     TSH with free T4 reflex     Comprehensive Metabolic Panel     EKG 12-lead, tracing only     CBC with Platelets & Differential ([facility] Only)     TSH with free T4 reflex     Comprehensive Metabolic Panel      2. Right Hip Arthritis  M16.10       3. Elevated coronary artery calcium score  R93.1 EKG 12-lead, tracing only      4. Benign prostatic hyperplasia with urinary frequency  N40.1 tamsulosin (FLOMAX) 0.4 MG capsule    R35.0       5. Hypothyroidism following radioiodine therapy  E89.0       6. 1st degree AV block  I44.0       7. Lipid screening  Z13.220 Lipid Panel     Lipid Panel           Risks and Recommendations:  The patient has the following additional risks and recommendations for perioperative complications:   - No identified additional risk factors other than previously addressed    Antiplatelet or Anticoagulation Medication Instructions:   - aspirin: Discontinue aspirin 7-10 days prior to procedure to reduce bleeding risk. It should be resumed postoperatively.     Additional Medication Instructions:  Patient is to take all scheduled medications on the day of  surgery    RECOMMENDATION:  APPROVAL GIVEN to proceed with proposed procedure, without further diagnostic evaluation.    Subjective       HPI related to upcoming procedure:      Patient presents to clinic for preoperative evaluation for right total hip arthroplasty scheduled on 11/7/2023 by Dr. Reich.  Patient has had continued ongoing pain in his right hip with history of severe arthritis and deformity changes in the femoral head.  He is looking forward to upcoming procedure as his pain has become increasingly more bothersome.  Patient denies having any difficulties with anesthesia in the past, and is able to walk up stairs without feeling short of breath.         10/22/2023     9:20 AM   Preop Questions   1. Have you ever had a heart attack or stroke? No   2. Have you ever had surgery on your heart or blood vessels, such as a stent placement, a coronary artery bypass, or surgery on an artery in your head, neck, heart, or legs? No   3. Do you have chest pain with activity? No   4. Do you have a history of  heart failure? No   5. Do you currently have a cold, bronchitis or symptoms of other infection? No   6. Do you have a cough, shortness of breath, or wheezing? No   7. Do you or anyone in your family have previous history of blood clots? Family History Unknown patient does not have history of this personally   8. Do you or does anyone in your family have a serious bleeding problem such as prolonged bleeding following surgeries or cuts? Family History Unknown patient does not have history of this personally   9. Have you ever had problems with anemia or been told to take iron pills? No   10. Have you had any abnormal blood loss such as black, tarry or bloody stools? No   11. Have you ever had a blood transfusion? No   12. Are you willing to have a blood transfusion if it is medically needed before, during, or after your surgery? Yes   13. Have you or any of your relatives ever had problems with anesthesia? No    14. Do you have sleep apnea, excessive snoring or daytime drowsiness? No   15. Do you have any artifical heart valves or other implanted medical devices like a pacemaker, defibrillator, or continuous glucose monitor? No   16. Do you have artificial joints? No   17. Are you allergic to latex? No       Health Care Directive:  Patient has a Health Care Directive on file      Preoperative Review of :   reviewed - no record of controlled substances prescribed.      Status of Chronic Conditions:  HYPERLIPIDEMIA - Patient has a long history of significant Hyperlipidemia requiring medication for treatment with recent good control. Patient reports no problems or side effects with the medication.  He does have a history of elevated coronary artery calcium score.  Denies any chest pain/pressure.  He is currently on a statin and baby aspirin.    HYPOTHYROIDISM - Patient has a longstanding history of chronic Hypothyroidism secondary to thyroid ablation.  Patient has been doing well, noting no tremor, insomnia, hair loss or changes in skin texture. Continues to take medications as directed, without adverse reactions or side effects. Last TSH   Lab Results   Component Value Date    TSH 2.40 10/23/2023   .      Review of Systems   Constitutional:  Negative for chills and fever.   Respiratory:  Negative for cough, shortness of breath and wheezing.    Cardiovascular:  Negative for chest pain, palpitations and peripheral edema.   Gastrointestinal:  Negative for abdominal pain.   Genitourinary:  Negative for dysuria.   Musculoskeletal:  Positive for arthralgias (Right hip).   Skin:  Negative for rash.   Neurological:  Negative for dizziness and light-headedness.       Patient Active Problem List    Diagnosis Date Noted    1st degree AV block 12/01/2020     Priority: Medium    ACP (advance care planning) 02/14/2018     Priority: Medium    Graves' disease 01/19/2018     Priority: Medium     Overview:   I131 ablation 2002       Hypothyroidism following radioiodine therapy 01/19/2018     Priority: Medium    Elevated coronary artery calcium score 11/15/2017     Priority: Medium    Actinic keratosis of scalp 11/09/2016     Priority: Medium    Seborrheic keratoses 11/09/2016     Priority: Medium    Tubular adenoma of colon 01/11/2016     Priority: Medium      Past Medical History:   Diagnosis Date    Atrial fibrillation (H) 11/2002    cardioversion 11/02    Benign neoplasm of colon     1/11/2016    Elevated coronary artery calcium score 11/15/2017    Postprocedural hypothyroidism     No Comments Provided    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     I131 ablation 2002    Tubular adenoma of colon 1/11/2016    Zoster without complications     1982,right facial     Past Surgical History:   Procedure Laterality Date    C THYROID ABLATION      CLOSED RX ELBOW DISLOCATION  07/2018    Dr Reich    COLONOSCOPY  11/18/2016    Dr Rosa    COLONOSCOPY  05/06/2021    normal    EXCISE LESION (LOCATION) Left 2/22/2023    Procedure: WIDE LOCAL EXCISION OF BACK LESION, FOR MELANOMA , BIOPSY, LYMPH NODE, SENTINEL;  Surgeon: David Biggs MD;  Location: [facility] OR    VASECTOMY      No Comments Provided     Current Outpatient Medications   Medication Sig Dispense Refill    acetaminophen (TYLENOL) 500 MG tablet Take 500-1,000 mg by mouth 4 times daily      aspirin EC 81 MG EC tablet Take 81 mg by mouth daily with food Take 1 tablet by mouth once daily with a meal.      atorvastatin (LIPITOR) 80 MG tablet Take 1 tablet (80 mg) by mouth daily (with dinner) 90 tablet 4    levothyroxine (SYNTHROID/LEVOTHROID) 100 MCG tablet Take 1 tablet (100 mcg) by mouth every morning (before breakfast) Take 1 tablet by mouth before breakfast. 90 tablet 4    tamsulosin (FLOMAX) 0.4 MG capsule Take 1 capsule (0.4 mg) by mouth daily 90 capsule 0       No Known Allergies     Social History     Tobacco Use    Smoking status: Never    Smokeless tobacco: Never  "  Substance Use Topics    Alcohol use: Yes     Alcohol/week: 12.5 standard drinks of alcohol     Comment: 1 shot and 1 glass of wine daily.       History   Drug Use No         Objective     /88 (BP Location: Right arm, Patient Position: Chair, Cuff Size: Adult Large)   Pulse 59   Temp 96.9  F (36.1  C) (Temporal)   Resp 18   Ht 1.727 m (5' 8\")   Wt 103 kg (227 lb)   SpO2 98%   BMI 34.52 kg/m      Physical Exam  Constitutional:       General: He is not in acute distress.     Appearance: He is well-developed. He is obese.   HENT:      Head: Normocephalic and atraumatic.      Nose: Nose normal.      Mouth/Throat:      Pharynx: No oropharyngeal exudate.   Eyes:      General: No scleral icterus.     Conjunctiva/sclera: Conjunctivae normal.      Pupils: Pupils are equal, round, and reactive to light.   Neck:      Thyroid: No thyromegaly.   Cardiovascular:      Rate and Rhythm: Normal rate and regular rhythm.      Heart sounds: No murmur heard.  Pulmonary:      Effort: Pulmonary effort is normal. No respiratory distress.      Breath sounds: Normal breath sounds. No wheezing.   Musculoskeletal:         General: No tenderness or deformity. Normal range of motion.      Cervical back: Normal range of motion and neck supple.   Skin:     General: Skin is warm and dry.      Findings: No rash.   Neurological:      Mental Status: He is alert and oriented to person, place, and time.      Cranial Nerves: No cranial nerve deficit.      Coordination: Coordination normal.   Psychiatric:         Behavior: Behavior normal.         Thought Content: Thought content normal.         Judgment: Judgment normal.         Diagnostics:  Recent Results (from the past 48 hour(s))   EKG 12-lead, tracing only    Collection Time: 10/23/23 11:51 AM   Result Value Ref Range    Systolic Blood Pressure  mmHg    Diastolic Blood Pressure  mmHg    Ventricular Rate 54 BPM    Atrial Rate 54 BPM    LA Interval 272 ms    QRS Duration 78 ms     " ms    QTc 455 ms    P Axis 40 degrees    R AXIS 5 degrees    T Axis 50 degrees    Interpretation ECG       Sinus bradycardia with 1st degree A-V block  Otherwise normal ECG  When compared with ECG of 30-NOV-2020 11:25,  No significant change was found     TSH with free T4 reflex    Collection Time: 10/23/23 12:00 PM   Result Value Ref Range    TSH 2.40 0.30 - 4.20 uIU/mL   Comprehensive Metabolic Panel    Collection Time: 10/23/23 12:00 PM   Result Value Ref Range    Sodium 139 135 - 145 mmol/L    Potassium 4.2 3.4 - 5.3 mmol/L    Carbon Dioxide (CO2) 24 22 - 29 mmol/L    Anion Gap 10 7 - 15 mmol/L    Urea Nitrogen 13.7 8.0 - 23.0 mg/dL    Creatinine 0.84 0.67 - 1.17 mg/dL    GFR Estimate 88 >60 mL/min/1.73m2    Calcium 9.3 8.8 - 10.2 mg/dL    Chloride 105 98 - 107 mmol/L    Glucose 103 (H) 70 - 99 mg/dL    Alkaline Phosphatase 102 40 - 129 U/L    AST 21 0 - 45 U/L    ALT 13 0 - 70 U/L    Protein Total 7.2 6.4 - 8.3 g/dL    Albumin 4.3 3.5 - 5.2 g/dL    Bilirubin Total 0.7 <=1.2 mg/dL   Lipid Panel    Collection Time: 10/23/23 12:00 PM   Result Value Ref Range    Cholesterol 151 <200 mg/dL    Triglycerides 83 <150 mg/dL    Direct Measure HDL 65 >=40 mg/dL    LDL Cholesterol Calculated 69 <=100 mg/dL    Non HDL Cholesterol 86 <130 mg/dL   CBC with platelets and differential    Collection Time: 10/23/23 12:00 PM   Result Value Ref Range    WBC Count 6.0 4.0 - 11.0 10e3/uL    RBC Count 4.19 (L) 4.40 - 5.90 10e6/uL    Hemoglobin 14.0 13.3 - 17.7 g/dL    Hematocrit 40.7 40.0 - 53.0 %    MCV 97 78 - 100 fL    MCH 33.4 (H) 26.5 - 33.0 pg    MCHC 34.4 31.5 - 36.5 g/dL    RDW 12.3 10.0 - 15.0 %    Platelet Count 265 150 - 450 10e3/uL    % Neutrophils 61 %    % Lymphocytes 27 %    % Monocytes 8 %    % Eosinophils 3 %    % Basophils 1 %    % Immature Granulocytes 0 %    NRBCs per 100 WBC 0 <1 /100    Absolute Neutrophils 3.6 1.6 - 8.3 10e3/uL    Absolute Lymphocytes 1.6 0.8 - 5.3 10e3/uL    Absolute Monocytes 0.5 0.0 - 1.3  10e3/uL    Absolute Eosinophils 0.2 0.0 - 0.7 10e3/uL    Absolute Basophils 0.1 0.0 - 0.2 10e3/uL    Absolute Immature Granulocytes 0.0 <=0.4 10e3/uL    Absolute NRBCs 0.0 10e3/uL   Extra Serum Separator Tube (SST)    Collection Time: 10/23/23 12:00 PM   Result Value Ref Range    Hold Specimen JIC       EKG: Sinus bradycardia with 1st degree AV block, unchanged from previous tracings    Revised Cardiac Risk Index (RCRI):  The patient has the following serious cardiovascular risks for perioperative complications:   - No serious cardiac risks = 0 points     RCRI Interpretation: 0 points: Class I (very low risk - 0.4% complication rate)       Signed Electronically by: CRISTIAN Acosta CNP  Copy of this evaluation report is provided to requesting physician.

## 2023-10-24 LAB
ATRIAL RATE - MUSE: 54 BPM
DIASTOLIC BLOOD PRESSURE - MUSE: NORMAL MMHG
INTERPRETATION ECG - MUSE: NORMAL
P AXIS - MUSE: 40 DEGREES
PR INTERVAL - MUSE: 272 MS
QRS DURATION - MUSE: 78 MS
QT - MUSE: 480 MS
QTC - MUSE: 455 MS
R AXIS - MUSE: 5 DEGREES
SYSTOLIC BLOOD PRESSURE - MUSE: NORMAL MMHG
T AXIS - MUSE: 50 DEGREES
VENTRICULAR RATE- MUSE: 54 BPM

## 2023-10-24 ASSESSMENT — ENCOUNTER SYMPTOMS
WHEEZING: 0
SHORTNESS OF BREATH: 0
CHILLS: 0
ABDOMINAL PAIN: 0
DYSURIA: 0
PALPITATIONS: 0
FEVER: 0
LIGHT-HEADEDNESS: 0
DIZZINESS: 0
COUGH: 0
ARTHRALGIAS: 1

## 2023-11-06 ENCOUNTER — ANESTHESIA EVENT (OUTPATIENT)
Dept: SURGERY | Facility: OTHER | Age: 78
End: 2023-11-06
Payer: MEDICARE

## 2023-11-07 ENCOUNTER — HOSPITAL ENCOUNTER (OUTPATIENT)
Facility: OTHER | Age: 78
Discharge: NURSING, REHAB OR LONG-TERM CARE | End: 2023-11-08
Attending: ORTHOPAEDIC SURGERY | Admitting: ORTHOPAEDIC SURGERY
Payer: MEDICARE

## 2023-11-07 ENCOUNTER — ANESTHESIA (OUTPATIENT)
Dept: SURGERY | Facility: OTHER | Age: 78
End: 2023-11-07
Payer: MEDICARE

## 2023-11-07 ENCOUNTER — APPOINTMENT (OUTPATIENT)
Dept: GENERAL RADIOLOGY | Facility: OTHER | Age: 78
End: 2023-11-07
Attending: ORTHOPAEDIC SURGERY
Payer: MEDICARE

## 2023-11-07 ENCOUNTER — HOSPITAL ENCOUNTER (OUTPATIENT)
Dept: GENERAL RADIOLOGY | Facility: OTHER | Age: 78
Discharge: HOME | End: 2023-11-07
Attending: ORTHOPAEDIC SURGERY | Admitting: ORTHOPAEDIC SURGERY
Payer: MEDICARE

## 2023-11-07 ENCOUNTER — APPOINTMENT (OUTPATIENT)
Dept: PHYSICAL THERAPY | Facility: OTHER | Age: 78
End: 2023-11-07
Attending: ORTHOPAEDIC SURGERY
Payer: MEDICARE

## 2023-11-07 ENCOUNTER — APPOINTMENT (OUTPATIENT)
Dept: OCCUPATIONAL THERAPY | Facility: OTHER | Age: 78
End: 2023-11-07
Attending: ORTHOPAEDIC SURGERY
Payer: MEDICARE

## 2023-11-07 DIAGNOSIS — M25.551 HIP PAIN, RIGHT: ICD-10-CM

## 2023-11-07 DIAGNOSIS — N40.1 BENIGN PROSTATIC HYPERPLASIA WITH URINARY FREQUENCY: ICD-10-CM

## 2023-11-07 DIAGNOSIS — M25.551 RIGHT HIP PAIN: Primary | ICD-10-CM

## 2023-11-07 DIAGNOSIS — R35.0 BENIGN PROSTATIC HYPERPLASIA WITH URINARY FREQUENCY: ICD-10-CM

## 2023-11-07 LAB — GLUCOSE BLDC GLUCOMTR-MCNC: 97 MG/DL (ref 70–99)

## 2023-11-07 PROCEDURE — 250N000011 HC RX IP 250 OP 636: Performed by: ORTHOPAEDIC SURGERY

## 2023-11-07 PROCEDURE — 258N000003 HC RX IP 258 OP 636: Performed by: NURSE ANESTHETIST, CERTIFIED REGISTERED

## 2023-11-07 PROCEDURE — 250N000013 HC RX MED GY IP 250 OP 250 PS 637: Performed by: ORTHOPAEDIC SURGERY

## 2023-11-07 PROCEDURE — 97530 THERAPEUTIC ACTIVITIES: CPT | Mod: GP

## 2023-11-07 PROCEDURE — 99214 OFFICE O/P EST MOD 30 MIN: CPT | Mod: 25 | Performed by: HOSPITALIST

## 2023-11-07 PROCEDURE — 999N000180 XR SURGERY CARM FLUORO LESS THAN 5 MIN: Mod: TC

## 2023-11-07 PROCEDURE — 97165 OT EVAL LOW COMPLEX 30 MIN: CPT | Mod: GO | Performed by: OCCUPATIONAL THERAPIST

## 2023-11-07 PROCEDURE — 64447 NJX AA&/STRD FEMORAL NRV IMG: CPT | Mod: RT

## 2023-11-07 PROCEDURE — 710N000010 HC RECOVERY PHASE 1, LEVEL 2, PER MIN: Performed by: ORTHOPAEDIC SURGERY

## 2023-11-07 PROCEDURE — 258N000001 HC RX 258: Performed by: ORTHOPAEDIC SURGERY

## 2023-11-07 PROCEDURE — 27130 TOTAL HIP ARTHROPLASTY: CPT | Mod: RT | Performed by: ORTHOPAEDIC SURGERY

## 2023-11-07 PROCEDURE — 250N000011 HC RX IP 250 OP 636: Performed by: NURSE ANESTHETIST, CERTIFIED REGISTERED

## 2023-11-07 PROCEDURE — 27130 TOTAL HIP ARTHROPLASTY: CPT | Performed by: NURSE ANESTHETIST, CERTIFIED REGISTERED

## 2023-11-07 PROCEDURE — 97116 GAIT TRAINING THERAPY: CPT | Mod: GP

## 2023-11-07 PROCEDURE — 250N000011 HC RX IP 250 OP 636: Mod: JZ

## 2023-11-07 PROCEDURE — 370N000017 HC ANESTHESIA TECHNICAL FEE, PER MIN: Performed by: ORTHOPAEDIC SURGERY

## 2023-11-07 PROCEDURE — 97161 PT EVAL LOW COMPLEX 20 MIN: CPT | Mod: GP

## 2023-11-07 PROCEDURE — 258N000003 HC RX IP 258 OP 636: Performed by: ORTHOPAEDIC SURGERY

## 2023-11-07 PROCEDURE — 72170 X-RAY EXAM OF PELVIS: CPT

## 2023-11-07 PROCEDURE — 82962 GLUCOSE BLOOD TEST: CPT

## 2023-11-07 PROCEDURE — 250N000009 HC RX 250: Performed by: NURSE ANESTHETIST, CERTIFIED REGISTERED

## 2023-11-07 PROCEDURE — C1776 JOINT DEVICE (IMPLANTABLE): HCPCS | Performed by: ORTHOPAEDIC SURGERY

## 2023-11-07 PROCEDURE — 96374 THER/PROPH/DIAG INJ IV PUSH: CPT | Mod: XU

## 2023-11-07 PROCEDURE — 360N000084 HC SURGERY LEVEL 4 W/ FLUORO, PER MIN: Performed by: ORTHOPAEDIC SURGERY

## 2023-11-07 PROCEDURE — 97530 THERAPEUTIC ACTIVITIES: CPT | Mod: GO | Performed by: OCCUPATIONAL THERAPIST

## 2023-11-07 PROCEDURE — 999N000141 HC STATISTIC PRE-PROCEDURE NURSING ASSESSMENT: Performed by: ORTHOPAEDIC SURGERY

## 2023-11-07 PROCEDURE — 272N000001 HC OR GENERAL SUPPLY STERILE: Performed by: ORTHOPAEDIC SURGERY

## 2023-11-07 PROCEDURE — 99100 ANES PT EXTEME AGE<1 YR&>70: CPT | Performed by: NURSE ANESTHETIST, CERTIFIED REGISTERED

## 2023-11-07 PROCEDURE — 250N000011 HC RX IP 250 OP 636: Mod: JZ | Performed by: NURSE ANESTHETIST, CERTIFIED REGISTERED

## 2023-11-07 PROCEDURE — 250N000011 HC RX IP 250 OP 636

## 2023-11-07 DEVICE — IMPLANTABLE DEVICE
Type: IMPLANTABLE DEVICE | Site: HIP | Status: FUNCTIONAL
Brand: TAPERLOC COMPLETE PRIMARY FEMORAL

## 2023-11-07 DEVICE — IMPLANTABLE DEVICE
Type: IMPLANTABLE DEVICE | Site: HIP | Status: FUNCTIONAL
Brand: BIOLOX® DELTA OPTION

## 2023-11-07 DEVICE — IMPLANTABLE DEVICE
Type: IMPLANTABLE DEVICE | Site: HIP | Status: FUNCTIONAL
Brand: CONTINUUM® TRILOGY® ALLOFIT® VIVACIT-E®

## 2023-11-07 DEVICE — IMPLANTABLE DEVICE
Type: IMPLANTABLE DEVICE | Site: HIP | Status: FUNCTIONAL
Brand: CONTINUUM® TRABECULAR METAL™

## 2023-11-07 RX ORDER — DEXAMETHASONE SODIUM PHOSPHATE 4 MG/ML
INJECTION, SOLUTION INTRA-ARTICULAR; INTRALESIONAL; INTRAMUSCULAR; INTRAVENOUS; SOFT TISSUE PRN
Status: DISCONTINUED | OUTPATIENT
Start: 2023-11-07 | End: 2023-11-07

## 2023-11-07 RX ORDER — FENTANYL CITRATE 50 UG/ML
50 INJECTION, SOLUTION INTRAMUSCULAR; INTRAVENOUS
Status: DISCONTINUED | OUTPATIENT
Start: 2023-11-07 | End: 2023-11-07 | Stop reason: HOSPADM

## 2023-11-07 RX ORDER — OXYCODONE HYDROCHLORIDE 5 MG/1
5 TABLET ORAL EVERY 4 HOURS PRN
Status: DISCONTINUED | OUTPATIENT
Start: 2023-11-07 | End: 2023-11-08 | Stop reason: HOSPADM

## 2023-11-07 RX ORDER — ONDANSETRON 4 MG/1
4 TABLET, ORALLY DISINTEGRATING ORAL EVERY 6 HOURS PRN
Status: DISCONTINUED | OUTPATIENT
Start: 2023-11-07 | End: 2023-11-08 | Stop reason: HOSPADM

## 2023-11-07 RX ORDER — DEXAMETHASONE SODIUM PHOSPHATE 10 MG/ML
INJECTION, SOLUTION INTRAMUSCULAR; INTRAVENOUS
Status: COMPLETED | OUTPATIENT
Start: 2023-11-07 | End: 2023-11-07

## 2023-11-07 RX ORDER — AMOXICILLIN 250 MG
1-2 CAPSULE ORAL 2 TIMES DAILY
Qty: 30 TABLET | Refills: 0 | Status: SHIPPED | OUTPATIENT
Start: 2023-11-07 | End: 2024-01-18

## 2023-11-07 RX ORDER — HYDROMORPHONE HCL IN WATER/PF 6 MG/30 ML
0.4 PATIENT CONTROLLED ANALGESIA SYRINGE INTRAVENOUS EVERY 5 MIN PRN
Status: DISCONTINUED | OUTPATIENT
Start: 2023-11-07 | End: 2023-11-07 | Stop reason: HOSPADM

## 2023-11-07 RX ORDER — ACETAMINOPHEN 325 MG/1
975 TABLET ORAL EVERY 8 HOURS
Qty: 27 TABLET | Refills: 0 | Status: DISCONTINUED | OUTPATIENT
Start: 2023-11-07 | End: 2023-11-08 | Stop reason: HOSPADM

## 2023-11-07 RX ORDER — ASPIRIN 81 MG/1
81 TABLET ORAL DAILY
Status: DISCONTINUED | OUTPATIENT
Start: 2023-11-07 | End: 2023-11-07

## 2023-11-07 RX ORDER — ASPIRIN 81 MG/1
81 TABLET ORAL 2 TIMES DAILY
Qty: 60 TABLET | Refills: 0 | Status: SHIPPED | OUTPATIENT
Start: 2023-11-07 | End: 2024-01-18

## 2023-11-07 RX ORDER — ACETAMINOPHEN 325 MG/1
975 TABLET ORAL ONCE
Status: COMPLETED | OUTPATIENT
Start: 2023-11-07 | End: 2023-11-07

## 2023-11-07 RX ORDER — BUPIVACAINE HYDROCHLORIDE 5 MG/ML
INJECTION, SOLUTION EPIDURAL; INTRACAUDAL
Status: COMPLETED | OUTPATIENT
Start: 2023-11-07 | End: 2023-11-07

## 2023-11-07 RX ORDER — BUPIVACAINE HYDROCHLORIDE 2.5 MG/ML
INJECTION, SOLUTION EPIDURAL; INFILTRATION; INTRACAUDAL
Status: COMPLETED | OUTPATIENT
Start: 2023-11-07 | End: 2023-11-07

## 2023-11-07 RX ORDER — PROCHLORPERAZINE MALEATE 5 MG
5 TABLET ORAL EVERY 6 HOURS PRN
Status: DISCONTINUED | OUTPATIENT
Start: 2023-11-07 | End: 2023-11-08 | Stop reason: HOSPADM

## 2023-11-07 RX ORDER — NALOXONE HYDROCHLORIDE 0.4 MG/ML
0.4 INJECTION, SOLUTION INTRAMUSCULAR; INTRAVENOUS; SUBCUTANEOUS
Status: DISCONTINUED | OUTPATIENT
Start: 2023-11-07 | End: 2023-11-08 | Stop reason: HOSPADM

## 2023-11-07 RX ORDER — ASPIRIN 81 MG/1
81 TABLET ORAL 2 TIMES DAILY
Status: DISCONTINUED | OUTPATIENT
Start: 2023-11-08 | End: 2023-11-08 | Stop reason: HOSPADM

## 2023-11-07 RX ORDER — NALOXONE HYDROCHLORIDE 0.4 MG/ML
0.2 INJECTION, SOLUTION INTRAMUSCULAR; INTRAVENOUS; SUBCUTANEOUS
Status: DISCONTINUED | OUTPATIENT
Start: 2023-11-07 | End: 2023-11-08 | Stop reason: HOSPADM

## 2023-11-07 RX ORDER — POLYETHYLENE GLYCOL 3350 17 G/17G
17 POWDER, FOR SOLUTION ORAL DAILY
Status: DISCONTINUED | OUTPATIENT
Start: 2023-11-08 | End: 2023-11-08 | Stop reason: HOSPADM

## 2023-11-07 RX ORDER — HYDROXYZINE HYDROCHLORIDE 10 MG/1
10 TABLET, FILM COATED ORAL EVERY 6 HOURS PRN
Status: DISCONTINUED | OUTPATIENT
Start: 2023-11-07 | End: 2023-11-08 | Stop reason: HOSPADM

## 2023-11-07 RX ORDER — BISACODYL 10 MG
10 SUPPOSITORY, RECTAL RECTAL DAILY PRN
Status: DISCONTINUED | OUTPATIENT
Start: 2023-11-07 | End: 2023-11-08 | Stop reason: HOSPADM

## 2023-11-07 RX ORDER — CEFAZOLIN SODIUM/WATER 2 G/20 ML
2 SYRINGE (ML) INTRAVENOUS EVERY 8 HOURS
Qty: 40 ML | Refills: 0 | Status: COMPLETED | OUTPATIENT
Start: 2023-11-07 | End: 2023-11-08

## 2023-11-07 RX ORDER — LIDOCAINE 40 MG/G
CREAM TOPICAL
Status: DISCONTINUED | OUTPATIENT
Start: 2023-11-07 | End: 2023-11-08 | Stop reason: HOSPADM

## 2023-11-07 RX ORDER — ATORVASTATIN CALCIUM 40 MG/1
80 TABLET, FILM COATED ORAL AT BEDTIME
Status: DISCONTINUED | OUTPATIENT
Start: 2023-11-07 | End: 2023-11-08 | Stop reason: HOSPADM

## 2023-11-07 RX ORDER — CEFAZOLIN SODIUM/WATER 2 G/20 ML
2 SYRINGE (ML) INTRAVENOUS SEE ADMIN INSTRUCTIONS
Status: DISCONTINUED | OUTPATIENT
Start: 2023-11-07 | End: 2023-11-07 | Stop reason: HOSPADM

## 2023-11-07 RX ORDER — PROPOFOL 10 MG/ML
INJECTION, EMULSION INTRAVENOUS CONTINUOUS PRN
Status: DISCONTINUED | OUTPATIENT
Start: 2023-11-07 | End: 2023-11-07

## 2023-11-07 RX ORDER — LEVOTHYROXINE SODIUM 100 UG/1
100 TABLET ORAL
Status: DISCONTINUED | OUTPATIENT
Start: 2023-11-08 | End: 2023-11-08 | Stop reason: HOSPADM

## 2023-11-07 RX ORDER — ONDANSETRON 2 MG/ML
INJECTION INTRAMUSCULAR; INTRAVENOUS PRN
Status: DISCONTINUED | OUTPATIENT
Start: 2023-11-07 | End: 2023-11-07

## 2023-11-07 RX ORDER — OXYCODONE HYDROCHLORIDE 5 MG/1
10 TABLET ORAL
Status: DISCONTINUED | OUTPATIENT
Start: 2023-11-07 | End: 2023-11-07 | Stop reason: HOSPADM

## 2023-11-07 RX ORDER — HYDROMORPHONE HCL IN WATER/PF 6 MG/30 ML
0.4 PATIENT CONTROLLED ANALGESIA SYRINGE INTRAVENOUS
Status: DISCONTINUED | OUTPATIENT
Start: 2023-11-07 | End: 2023-11-08 | Stop reason: HOSPADM

## 2023-11-07 RX ORDER — ACETAMINOPHEN 325 MG/1
650 TABLET ORAL EVERY 4 HOURS PRN
Status: DISCONTINUED | OUTPATIENT
Start: 2023-11-10 | End: 2023-11-08 | Stop reason: HOSPADM

## 2023-11-07 RX ORDER — ONDANSETRON 4 MG/1
4 TABLET, ORALLY DISINTEGRATING ORAL EVERY 30 MIN PRN
Status: DISCONTINUED | OUTPATIENT
Start: 2023-11-07 | End: 2023-11-07 | Stop reason: HOSPADM

## 2023-11-07 RX ORDER — HYDROXYZINE HYDROCHLORIDE 10 MG/1
10 TABLET, FILM COATED ORAL EVERY 6 HOURS PRN
Qty: 30 TABLET | Refills: 0 | Status: SHIPPED | OUTPATIENT
Start: 2023-11-07 | End: 2024-01-18

## 2023-11-07 RX ORDER — HYDROMORPHONE HCL IN WATER/PF 6 MG/30 ML
0.2 PATIENT CONTROLLED ANALGESIA SYRINGE INTRAVENOUS EVERY 5 MIN PRN
Status: DISCONTINUED | OUTPATIENT
Start: 2023-11-07 | End: 2023-11-07 | Stop reason: HOSPADM

## 2023-11-07 RX ORDER — HYDROMORPHONE HCL IN WATER/PF 6 MG/30 ML
0.2 PATIENT CONTROLLED ANALGESIA SYRINGE INTRAVENOUS
Status: DISCONTINUED | OUTPATIENT
Start: 2023-11-07 | End: 2023-11-08 | Stop reason: HOSPADM

## 2023-11-07 RX ORDER — FENTANYL CITRATE 50 UG/ML
25 INJECTION, SOLUTION INTRAMUSCULAR; INTRAVENOUS EVERY 5 MIN PRN
Status: DISCONTINUED | OUTPATIENT
Start: 2023-11-07 | End: 2023-11-07 | Stop reason: HOSPADM

## 2023-11-07 RX ORDER — TRANEXAMIC ACID 650 MG/1
1950 TABLET ORAL ONCE
Status: COMPLETED | OUTPATIENT
Start: 2023-11-07 | End: 2023-11-07

## 2023-11-07 RX ORDER — SODIUM CHLORIDE, SODIUM LACTATE, POTASSIUM CHLORIDE, CALCIUM CHLORIDE 600; 310; 30; 20 MG/100ML; MG/100ML; MG/100ML; MG/100ML
INJECTION, SOLUTION INTRAVENOUS CONTINUOUS
Status: DISCONTINUED | OUTPATIENT
Start: 2023-11-07 | End: 2023-11-08 | Stop reason: HOSPADM

## 2023-11-07 RX ORDER — TAMSULOSIN HYDROCHLORIDE 0.4 MG/1
0.4 CAPSULE ORAL DAILY
Status: DISCONTINUED | OUTPATIENT
Start: 2023-11-07 | End: 2023-11-08 | Stop reason: HOSPADM

## 2023-11-07 RX ORDER — KETAMINE HYDROCHLORIDE 10 MG/ML
INJECTION INTRAMUSCULAR; INTRAVENOUS PRN
Status: DISCONTINUED | OUTPATIENT
Start: 2023-11-07 | End: 2023-11-07

## 2023-11-07 RX ORDER — LIDOCAINE 40 MG/G
CREAM TOPICAL
Status: DISCONTINUED | OUTPATIENT
Start: 2023-11-07 | End: 2023-11-07 | Stop reason: HOSPADM

## 2023-11-07 RX ORDER — OXYCODONE HYDROCHLORIDE 5 MG/1
5 TABLET ORAL EVERY 4 HOURS PRN
Qty: 20 TABLET | Refills: 0 | Status: SHIPPED | OUTPATIENT
Start: 2023-11-07 | End: 2024-01-18

## 2023-11-07 RX ORDER — ONDANSETRON 2 MG/ML
4 INJECTION INTRAMUSCULAR; INTRAVENOUS EVERY 30 MIN PRN
Status: DISCONTINUED | OUTPATIENT
Start: 2023-11-07 | End: 2023-11-07 | Stop reason: HOSPADM

## 2023-11-07 RX ORDER — PROPOFOL 10 MG/ML
INJECTION, EMULSION INTRAVENOUS PRN
Status: DISCONTINUED | OUTPATIENT
Start: 2023-11-07 | End: 2023-11-07

## 2023-11-07 RX ORDER — ONDANSETRON 2 MG/ML
4 INJECTION INTRAMUSCULAR; INTRAVENOUS EVERY 6 HOURS PRN
Status: DISCONTINUED | OUTPATIENT
Start: 2023-11-07 | End: 2023-11-08 | Stop reason: HOSPADM

## 2023-11-07 RX ORDER — CEFAZOLIN SODIUM/WATER 2 G/20 ML
2 SYRINGE (ML) INTRAVENOUS
Status: DISCONTINUED | OUTPATIENT
Start: 2023-11-07 | End: 2023-11-07 | Stop reason: HOSPADM

## 2023-11-07 RX ORDER — AMOXICILLIN 250 MG
1 CAPSULE ORAL 2 TIMES DAILY
Status: DISCONTINUED | OUTPATIENT
Start: 2023-11-07 | End: 2023-11-08 | Stop reason: HOSPADM

## 2023-11-07 RX ORDER — FENTANYL CITRATE 50 UG/ML
50 INJECTION, SOLUTION INTRAMUSCULAR; INTRAVENOUS EVERY 5 MIN PRN
Status: DISCONTINUED | OUTPATIENT
Start: 2023-11-07 | End: 2023-11-07 | Stop reason: HOSPADM

## 2023-11-07 RX ORDER — OXYCODONE HYDROCHLORIDE 5 MG/1
5 TABLET ORAL
Status: DISCONTINUED | OUTPATIENT
Start: 2023-11-07 | End: 2023-11-07 | Stop reason: HOSPADM

## 2023-11-07 RX ORDER — SODIUM CHLORIDE, SODIUM LACTATE, POTASSIUM CHLORIDE, CALCIUM CHLORIDE 600; 310; 30; 20 MG/100ML; MG/100ML; MG/100ML; MG/100ML
INJECTION, SOLUTION INTRAVENOUS CONTINUOUS
Status: DISCONTINUED | OUTPATIENT
Start: 2023-11-07 | End: 2023-11-07 | Stop reason: HOSPADM

## 2023-11-07 RX ORDER — OXYCODONE HYDROCHLORIDE 5 MG/1
10 TABLET ORAL EVERY 4 HOURS PRN
Status: DISCONTINUED | OUTPATIENT
Start: 2023-11-07 | End: 2023-11-08 | Stop reason: HOSPADM

## 2023-11-07 RX ORDER — ACETAMINOPHEN 500 MG
500-1000 TABLET ORAL 4 TIMES DAILY
Status: DISCONTINUED | OUTPATIENT
Start: 2023-11-07 | End: 2023-11-07

## 2023-11-07 RX ORDER — ACETAMINOPHEN 325 MG/1
650 TABLET ORAL EVERY 4 HOURS PRN
Qty: 100 TABLET | Refills: 0 | Status: SHIPPED | OUTPATIENT
Start: 2023-11-07 | End: 2024-01-18

## 2023-11-07 RX ADMIN — MEPIVACAINE HYDROCHLORIDE 2.5 ML: 20 INJECTION, SOLUTION INFILTRATION at 11:01

## 2023-11-07 RX ADMIN — DEXAMETHASONE SODIUM PHOSPHATE 4 MG: 4 INJECTION, SOLUTION INTRA-ARTICULAR; INTRALESIONAL; INTRAMUSCULAR; INTRAVENOUS; SOFT TISSUE at 11:22

## 2023-11-07 RX ADMIN — MIDAZOLAM 2 MG: 1 INJECTION INTRAMUSCULAR; INTRAVENOUS at 10:06

## 2023-11-07 RX ADMIN — PROPOFOL 50 MCG/KG/MIN: 10 INJECTION, EMULSION INTRAVENOUS at 11:09

## 2023-11-07 RX ADMIN — MIDAZOLAM HYDROCHLORIDE 1 MG: 1 INJECTION, SOLUTION INTRAMUSCULAR; INTRAVENOUS at 10:58

## 2023-11-07 RX ADMIN — TAMSULOSIN HYDROCHLORIDE 0.4 MG: 0.4 CAPSULE ORAL at 15:39

## 2023-11-07 RX ADMIN — PROPOFOL 50 MG: 10 INJECTION, EMULSION INTRAVENOUS at 11:09

## 2023-11-07 RX ADMIN — BUPIVACAINE HYDROCHLORIDE 20 ML: 2.5 INJECTION, SOLUTION EPIDURAL; INFILTRATION; INTRACAUDAL; PERINEURAL at 10:15

## 2023-11-07 RX ADMIN — SENNOSIDES AND DOCUSATE SODIUM 1 TABLET: 50; 8.6 TABLET ORAL at 21:00

## 2023-11-07 RX ADMIN — ACETAMINOPHEN 975 MG: 325 TABLET, FILM COATED ORAL at 09:00

## 2023-11-07 RX ADMIN — Medication 2 G: at 10:50

## 2023-11-07 RX ADMIN — SODIUM CHLORIDE, POTASSIUM CHLORIDE, SODIUM LACTATE AND CALCIUM CHLORIDE: 600; 310; 30; 20 INJECTION, SOLUTION INTRAVENOUS at 14:03

## 2023-11-07 RX ADMIN — OXYCODONE HYDROCHLORIDE 5 MG: 5 TABLET ORAL at 15:38

## 2023-11-07 RX ADMIN — Medication 2 G: at 20:56

## 2023-11-07 RX ADMIN — ONDANSETRON HYDROCHLORIDE 4 MG: 2 SOLUTION INTRAMUSCULAR; INTRAVENOUS at 11:22

## 2023-11-07 RX ADMIN — SODIUM CHLORIDE, SODIUM LACTATE, POTASSIUM CHLORIDE, AND CALCIUM CHLORIDE: 600; 310; 30; 20 INJECTION, SOLUTION INTRAVENOUS at 11:25

## 2023-11-07 RX ADMIN — ACETAMINOPHEN 975 MG: 325 TABLET, FILM COATED ORAL at 16:59

## 2023-11-07 RX ADMIN — FENTANYL CITRATE 50 MCG: 50 INJECTION, SOLUTION INTRAMUSCULAR; INTRAVENOUS at 12:43

## 2023-11-07 RX ADMIN — SODIUM CHLORIDE, SODIUM LACTATE, POTASSIUM CHLORIDE, AND CALCIUM CHLORIDE: 600; 310; 30; 20 INJECTION, SOLUTION INTRAVENOUS at 09:32

## 2023-11-07 RX ADMIN — Medication 25 MG: at 10:58

## 2023-11-07 RX ADMIN — BUPIVACAINE HYDROCHLORIDE 15 ML: 5 INJECTION, SOLUTION EPIDURAL; INTRACAUDAL; PERINEURAL at 10:15

## 2023-11-07 RX ADMIN — HYDROMORPHONE HYDROCHLORIDE 0.2 MG: 0.2 INJECTION, SOLUTION INTRAMUSCULAR; INTRAVENOUS; SUBCUTANEOUS at 14:13

## 2023-11-07 RX ADMIN — TRANEXAMIC ACID 1950 MG: 650 TABLET ORAL at 09:01

## 2023-11-07 RX ADMIN — ATORVASTATIN CALCIUM 80 MG: 40 TABLET, FILM COATED ORAL at 21:00

## 2023-11-07 RX ADMIN — DEXAMETHASONE SODIUM PHOSPHATE 10 MG: 10 INJECTION, SOLUTION INTRAMUSCULAR; INTRAVENOUS at 10:15

## 2023-11-07 ASSESSMENT — ENCOUNTER SYMPTOMS: DYSRHYTHMIAS: 1

## 2023-11-07 ASSESSMENT — ACTIVITIES OF DAILY LIVING (ADL)
ADLS_ACUITY_SCORE: 29
ADLS_ACUITY_SCORE: 28
ADLS_ACUITY_SCORE: 28
ADLS_ACUITY_SCORE: 30
ADLS_ACUITY_SCORE: 29
ADLS_ACUITY_SCORE: 28
ADLS_ACUITY_SCORE: 29
ADLS_ACUITY_SCORE: 29

## 2023-11-07 NOTE — ANESTHESIA PREPROCEDURE EVALUATION
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     I131 ablation     Tubular adenoma of colon 2016    Zoster without complications     1982,right facial      Past Surgical History:   Procedure Laterality Date    C THYROID ABLATION      CLOSED RX ELBOW DISLOCATION  2018    Dr Reich    COLONOSCOPY  2016    Dr Rosa    COLONOSCOPY  2021    normal    EXCISE LESION (LOCATION) Left 2023    Procedure: WIDE LOCAL EXCISION OF BACK LESION, FOR MELANOMA , BIOPSY, LYMPH NODE, SENTINEL;  Surgeon: David Biggs MD;  Location: [facility] OR    VASECTOMY      No Comments Provided      No Known Allergies   Social History     Tobacco Use    Smoking status: Never    Smokeless tobacco: Never   Substance Use Topics    Alcohol use: Yes     Alcohol/week: 12.5 standard drinks of alcohol     Comment: 1 shot and 1 glass of wine daily.      Wt Readings from Last 1 Encounters:   23 103 kg (227 lb)        Anesthesia Evaluation   Pt has had prior anesthetic. Type: General and MAC.    No history of anesthetic complications       ROS/MED HX  ENT/Pulmonary:     (+)     ELY risk factors,  hypertension, obese,                               Neurologic:  - neg neurologic ROS     Cardiovascular:     (+) Dyslipidemia - -  CAD -  - -                        dysrhythmias, a-fib, Irregular Heartbeat/Palpitations,       Previous cardiac testing   Echo: Date: Results:    Stress Test:  Date: Results:    ECG Reviewed:  Date: 10/23/2022 Results:  SB 54 with 1st degree AV  "block  Cath:  Date: Results:      METS/Exercise Tolerance: >4 METS    Hematologic:  - neg hematologic  ROS     Musculoskeletal:  - neg musculoskeletal ROS     GI/Hepatic:  - neg GI/hepatic ROS     Renal/Genitourinary:  - neg Renal ROS     Endo:     (+)          thyroid problem, hypothyroidism Grave's disease,           Psychiatric/Substance Use:  - neg psychiatric ROS     Infectious Disease:  - neg infectious disease ROS     Malignancy:  - neg malignancy ROS     Other:  - neg other ROS   (-) Any chance pregnant       Physical Exam    Airway        Mallampati: III   TM distance: > 3 FB   Neck ROM: full   Mouth opening: > 3 cm    Respiratory Devices and Support         Dental       (+) Completely normal teeth      Cardiovascular   cardiovascular exam normal       Rhythm and rate: regular and normal     Pulmonary   pulmonary exam normal        breath sounds clear to auscultation           OUTSIDE LABS:  CBC:   Lab Results   Component Value Date    WBC 6.0 10/23/2023    WBC 5.1 11/30/2020    HGB 14.0 10/23/2023    HGB 14.4 11/30/2020    HCT 40.7 10/23/2023    HCT 43.3 11/30/2020     10/23/2023     11/30/2020     BMP:   Lab Results   Component Value Date     10/23/2023     01/09/2023    POTASSIUM 4.2 10/23/2023    POTASSIUM 4.3 01/09/2023    CHLORIDE 105 10/23/2023    CHLORIDE 105 01/09/2023    CO2 24 10/23/2023    CO2 27 01/09/2023    BUN 13.7 10/23/2023    BUN 12.8 01/09/2023    CR 0.84 10/23/2023    CR 0.74 01/09/2023    GLC 97 11/07/2023     (H) 10/23/2023     COAGS: No results found for: \"PTT\", \"INR\", \"FIBR\"  POC: No results found for: \"BGM\", \"HCG\", \"HCGS\"  HEPATIC:   Lab Results   Component Value Date    ALBUMIN 4.3 10/23/2023    PROTTOTAL 7.2 10/23/2023    ALT 13 10/23/2023    AST 21 10/23/2023    ALKPHOS 102 10/23/2023    BILITOTAL 0.7 10/23/2023     OTHER:   Lab Results   Component Value Date    A1C 5.4 11/30/2020    TIERA 9.3 10/23/2023    TSH 2.40 10/23/2023       Anesthesia " Plan    ASA Status:  3    NPO Status:  NPO Appropriate    Anesthesia Type: Spinal (Consented for General anesthesia in case of failed spinal).              Consents    Anesthesia Plan(s) and associated risks, benefits, and realistic alternatives discussed. Questions answered and patient/representative(s) expressed understanding.     - Discussed: Risks, Benefits and Alternatives for BOTH SEDATION and the PROCEDURE were discussed     - Discussed with:  Patient, Spouse      - Extended Intubation/Ventilatory Support Discussed: No.      - Patient is DNR/DNI Status: No     Use of blood products discussed: No .     Postoperative Care    Pain management: Peripheral nerve block (Single Shot) (PENG block with ultrasound guidance.).   PONV prophylaxis: Dexamethasone or Solumedrol, Ondansetron (or other 5HT-3)     Comments:                CRISTIAN Haskins CRNA

## 2023-11-07 NOTE — PROVIDER NOTIFICATION
11/07/23 1437 11/07/23 1438 11/07/23 1441   Vital Signs   Resp 18  --   --    Pulse 51 (!) 48 58   Pulse Rate Source Monitor  --   --    BP (!) 87/50  (after getting up ambulated to bathroom) 105/53  (laying flat in recliner) 112/60     Patient had previously been hypertensive at 180/80.  Patient given dilaudid for pain.  Patient was unable to void and bladder scanned for 489.  Patient assisted up with this writer and PT.  Patient was able to void 425 and also missed hat and had some dribbling.  Patient then stated he was feeling dizzy.  Patient assisted to recliner and laid back.  Patient had blood pressures as noted above.  As patient was laying in recliner he states he was feeling much better.  Updated MD of orthostatic event.  Orders to continue to monitor blood pressures and notify MD if blood pressure elevates above 160 systolic.  Notify MD if patient is retaining urine/unable to void.  Will also be aware of orthostatic blood pressure and patient to have staff assist with ambulating and all transfers.

## 2023-11-07 NOTE — BRIEF OP NOTE
[facility]    Brief Operative Note    Pre-operative diagnosis: Arthritis of right hip [M16.11]  Post-operative diagnosis Same as pre-operative diagnosis    Procedure: ARTHROPLASTY, HIP, TOTAL, DIRECT ANTERIOR APPROACH, Right - Hip    Surgeon: Surgeon(s) and Role:     * Terry Reich MD - Primary     * Vivek Tidwell PA-C - Assisting  Anesthesia: Spinal with Block   Estimated Blood Loss: 200 ml    Drains: None  Specimens: * No specimens in log *  Findings:   None.  Complications: None.  Implants:   Implant Name Type Inv. Item Serial No.  Lot No. LRB No. Used Action   IMP SHELL ZIM CONTINUUM CLSTR-HL 54MM [facility] -076-01 - [id number] Total Joint Component/Insert IMP SHELL ZIM CONTINUUM CLSTR-HL 54MM [facility] -220-01  [employer] [id number] Right 1 Implanted   IMP LINER ZIM CNTINM VIVACIT-E RONAL  36X54 -867-36 - [id number] Total Joint Component/Insert IMP LINER ZIM CNTINM VIVACIT-E ROANL J 36X54 -567-36  [employer] [id number] Right 1 Implanted   IMP STEM FEM BIOM TAPERLOC STD OFFSET TYPE 1 [id number] - [id number] Total Joint Component/Insert IMP STEM FEM BIOM TAPERLOC STD OFFSET TYPE 1 [id number]  [employer] [id number] Right 1 Implanted   CERAMIC HEAD    BIOMET [id number] Right 1 Implanted   TAPER SLEEVE    BIOMET [id number] Right 1 Implanted

## 2023-11-07 NOTE — INTERVAL H&P NOTE
Brief History of Illness:   Justino Wang is a 78 year old male who was admitted for right hip pain    H&P reviewed and patient examined with no new updates from prior exam

## 2023-11-07 NOTE — ANESTHESIA POSTPROCEDURE EVALUATION
Patient: Justino Wang    Procedure: Procedure(s):  ARTHROPLASTY, HIP, TOTAL, DIRECT ANTERIOR APPROACH       Anesthesia Type:  Spinal    Note:  Disposition: Outpatient   Postop Pain Control: Uneventful            Sign Out: Well controlled pain   PONV: No   Neuro/Psych: Uneventful            Sign Out: Acceptable/Baseline neuro status   Airway/Respiratory: Uneventful            Sign Out: Acceptable/Baseline resp. status   CV/Hemodynamics: Uneventful            Sign Out: Acceptable CV status; No obvious hypovolemia; No obvious fluid overload   Other NRE: NONE   DID A NON-ROUTINE EVENT OCCUR? No           Last vitals:  Vitals Value Taken Time   /63 11/07/23 1255   Temp 97.8  F (36.6  C) 11/07/23 1245   Pulse 49 11/07/23 1256   Resp 30 11/07/23 1257   SpO2 93 % 11/07/23 1301   Vitals shown include unfiled device data.    Electronically Signed By: CRISTIAN Haskins CRNA  November 7, 2023  2:20 PM

## 2023-11-07 NOTE — CONSULTS
"[facility]  Consult Note - Hospitalist Service  Date of Admission:  11/7/2023  Consult Requested by: Dr. Reich  Reason for Consult: Medical Management    Assessment & Plan   Justino Wang is a 78 year old male admitted on 11/7/2023. He has a history of hypothyroidism, hyperlipidemia, osteoarthritis and BPH was admitted after right total hip arthroplasty by Dr. Reich.  He has the following acute medical issues:       Status post right hip arthroplasty: Postop management as per Dr. Reich.  PT/OT consult.  Pain control.  Prevent constipation.  DVT prophylaxis as per orthopedics.     Hyperlipidemia: Continue atorvastatin.    Hypothyroidism: Continue on levothyroxine.  Last TSH was on 10/23/2023 that was normal.    Possible urinary retention: The patient has history of BPH.  We will start on tamsulosin.  He was prescribed as an outpatient but never filled it up.  We will get a bladder scan and will straight cath if needed.    Hypertension: Postop hypertension probably contributed by pain, urinary retention.  If continues to be high then will start him on antihypertensives.     Clinically Significant Risk Factors Present on Admission                # Drug Induced Platelet Defect: home medication list includes an antiplatelet medication        # Obesity: Estimated body mass index is 34.52 kg/m  as calculated from the following:    Height as of this encounter: 1.727 m (5' 8\").    Weight as of this encounter: 103 kg (227 lb).            Discussed with the patient in detail.  He verbalized understanding.  We will follow with you.  Radha Carrillo MD  Hospitalist Service  Securely message with [employer] (more info)  Text page via [facility] Paging/Directory   ______________________________________________________________________    Chief Complaint   S/p Right total Hip Arthroplasty    History is obtained from the patient    History of Present Illness   Justino Wang is a 78 year old male with history of " hypothyroidism, hyperlipidemia, osteoarthritis and BPH was admitted after right total hip arthroplasty by Dr. Reich.   The patient has been doing well at home.  He has had right hip pain for a while but otherwise denies any significant complaints.  Denies any fevers or chills.  Denies any chest pain or shortness of breath.  He does not have any previous history of coronary artery disease.  After surgery his pain is under control.  He is reporting 3/10 right leg pain.       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     I131 ablation 2002    Tubular adenoma of colon 1/11/2016    Zoster without complications     1982,right facial       Past Surgical History   Past Surgical History:   Procedure Laterality Date    C THYROID ABLATION      CLOSED RX ELBOW DISLOCATION  07/2018    Dr Reich    COLONOSCOPY  11/18/2016    Dr Rosa    COLONOSCOPY  05/06/2021    normal    EXCISE LESION (LOCATION) Left 2/22/2023    Procedure: WIDE LOCAL EXCISION OF BACK LESION, FOR MELANOMA , BIOPSY, LYMPH NODE, SENTINEL;  Surgeon: David Biggs MD;  Location: [facility] OR    VASECTOMY      No Comments Provided       Medications   Medications Prior to Admission   Medication Sig Dispense Refill Last Dose    acetaminophen (TYLENOL) 500 MG tablet Take 500-1,000 mg by mouth 4 times daily   11/7/2023 at 0900    atorvastatin (LIPITOR) 80 MG tablet Take 1 tablet (80 mg) by mouth daily (with dinner) 90 tablet 4 11/6/2023 at pm    levothyroxine (SYNTHROID/LEVOTHROID) 100 MCG tablet Take 1 tablet (100 mcg) by mouth every morning (before breakfast) Take 1 tablet by mouth before breakfast. 90 tablet 4 11/7/2023 at 0700    tamsulosin (FLOMAX) 0.4 MG capsule Take 1  capsule (0.4 mg) by mouth daily 90 capsule 0 Unknown    aspirin EC 81 MG EC tablet Take 81 mg by mouth daily with food Take 1 tablet by mouth once daily with a meal.   2023          Review of Systems    Constitutional:  Negative for chills and fever.   Respiratory:  Negative for cough, shortness of breath and wheezing.    Cardiovascular:  Negative for chest pain, palpitations and peripheral edema.   Gastrointestinal:  Negative for abdominal pain.   Genitourinary:  Negative for dysuria.   Musculoskeletal:  Positive for arthralgias (Right hip pain).   Skin:  Negative for rash.   Neurological:  Negative for dizziness and light-headedness.     Social History   I have reviewed this patient's social history and updated it with pertinent information if needed.  Social History     Tobacco Use    Smoking status: Never    Smokeless tobacco: Never   Vaping Use    Vaping Use: Never used   Substance Use Topics    Alcohol use: Yes     Alcohol/week: 12.5 standard drinks of alcohol     Comment: 1 shot and 1 glass of wine daily.    Drug use: No         Family History   I have reviewed this patient's family history and updated it with pertinent information if needed.  Family History   Problem Relation Age of Onset    Other - See Comments Mother         natural  92    Diabetes Mother         Diabetes    Other - See Comments Father         pancreatic cancer  68    Heart Disease Father 64        Heart Disease,MI age 64         Allergies   No Known Allergies     Physical Exam   Vital Signs: Temp: 97.1  F (36.2  C) Temp src: Tympanic BP: (!) 156/65 Pulse: 51   Resp: 13 SpO2: 96 % O2 Device: None (Room air) Oxygen Delivery: 2 LPM  Weight: 227 lbs 0 oz    GENERAL APPEARANCE: The patient is  well-developed,  in no acute distress.  HEENT: Normocephalic and atraumatic. No scleral icterus.PERRLA. No conjunctival injection is noted. No oropharyngeal lesions.  NECK: Supple. Trachea is midline.  No lymphadenopathy or tenderness.  CHEST:  Symmetric. Nontender to palpation.  LUNGS: Breath sounds are equal and clear bilaterally. No wheezes, rhonchi, or rales.  HEART: Regular rate and rhythm with normal S1 and S2. No murmurs, gallops, or rubs.  ABDOMEN: Soft, flat, and benign. No mass, tenderness, guarding, or rebound. Bowel sounds are present. No CVA tenderness or flank mass.  RECTAL: Deferred  EXTREMITIES: No cyanosis, clubbing, or edema.  NEUROLOGIC: No focal sensory or motor deficits are noted. Cranial nerves II through XII are intact. Grossly nonfocal examination  PSYCHIATRIC: Appropriate mood and affect.  SKIN: Warm, dry, and well perfused. Good turgor. No lesions, nodules or rashes are noted.        Medical Decision Making       46 MINUTES SPENT BY ME on the date of service doing chart review, history, exam, documentation & further activities per the note.      Data   Recent Labs   Lab 11/07/23  0854   GLC 97

## 2023-11-07 NOTE — OP NOTE
Preoperative Diagnosis: Right Hip Osteoarthritis    Postoperative Diagnosis: Right Hip Osteoarthritis    Procedure: Right Direct Anterior Total Hip Arthroplasty    Surgeon: Terry Reich MD    Assistants: Vivek BECKER    A skilled first assistant was required for patient positioning, retracting, and carrying out the procedure in a safe and effective manner    Anesthesia:   1) Spinal with Sedation  2) Local Injection around surgical site    Findings:    1) Satisfactory MALENA with near equal restoration of leg length and offset postoperatively    2) Adequate hemostasis     Implants:    1) Nae taperloc Femoral Stem Size 15 Standard Offset   2) Size 54 mm nae continum Acetabular Shell   3) Size 36 mm standard poly acetabular insert   4) Size -3 36mm Ceramic Femoral Head      Estimated Blood Loss: 200 ml    Specimens: None    Drains: None    Complications: None    Indications:   This is a 78 year old patient with long standing right hip pain and severe osteoarthritis.  They have failed nonoperative treatment including use of NSAIDs; Tylenol; injections and exercise.  Given the continued symptoms they wished to proceed with a hip replacement.  The risks including pain, bleeding, infection, deep vein thrombosis, pulmonary embolism, myocardial infarction, component failure, need for revision operation was discussed with the patient.  The surgical consent was signed and surgical site was marked.  All questions regarding postoperative disposition were answered.      Description of Procedure:   The patient was taken to the operating room and placed under spinal anesthesia.  They were then moved to the [place] bed and positioned in standard fashion.  The C-arm was used to make a templating radiograph for post reconstruction comparison.  The Right hip was prepped with a Chloraprep wipe and sponge then draped in sterile fashion.  A timeout was called to identify the correct patient and surgical site.  A direct anterior  approach to the hip was utilized.  The interval between the tensor and sartorius was utilized. Care was taken to cauterize the circumflex vessels.  A capsulotomy was completed and tag sutures were placed.  The femoral neck was exposed and an osteotomy was completed.  .  The hip was externally rotated and the labrum excised.  Further release on the proximal femur was completed to improve visualization of the proximal femur later in the case.  The C-arm was used during reaming the acetabulum to check alignment and depth of reaming.  The acetabulum was reamed to a size 53 mm and the acetabular shell was placed.  The final 36 mm poly insert was placed and checked for a secure fit.  The traction was released and standard capsular releases were completed on the proximal femur.  The leg was externally rotated 120 degrees and brought into adduction and extension.  Standard retractor placement was utilized.  The femur was prepared with a box osteotome, canal finder and broaching up to a size 15.  The trial implants were placed and the hip was reduced.  C-arm was used to compare to preoperative leg length and offset.  The final size 15 Femoral stem was placed with a size -3 36 mm Oxinium femoral head and the hip was reduced.  Final C-arm images were used to confirm positioning.  No fractures were identified.  Pulse lavage and betadine irrigation was used.  The capsule was closed with #1 Vicryl sutures.  The tensor fascia was closed with a #1 Vicryl suture in running fashion.  The subcutaneous tissue was closed with a 2-0 Vicryl and staples. An Aquacel dressing was applied.  The patient was awakened and transferred to PACU in stable condition.  A post operative x-ray was taken in PACU.        Postoperative Plan:   Routine MALENA protocol (Weightbearing as tolerated, No hip ROM precautions, PT, Pain control, DVT prophylaxis with Aspirin).  Anticipate discharge in 1-2 days.  Follow-up in  2 weeks in [facility].       Physician assistant statement    A skilled first assistant was necessary for completion of the procedure and a technically safe and efficient manner.  He was integral in prepping draping intraoperative decision making retraction wound closure and patient transfer.

## 2023-11-07 NOTE — PROGRESS NOTES
11/07/23 1606   Appointment Info   Signing Clinician's Name / Credentials (OT) Kari Rg, OTR/L   Living Environment   People in Home spouse   Self-Care   Usual Activity Tolerance good   Current Activity Tolerance fair   Cognitive Status Examination   Orientation Status orientation to person, place and time   Affect/Mental Status (Cognitive) WFL   Follows Commands WFL   Pain Assessment   Patient Currently in Pain No   Range of Motion Comprehensive   General Range of Motion bilateral upper extremity ROM WFL   Coordination   Upper Extremity Coordination No deficits were identified   Transfers   Transfers sit-stand transfer   Sit-Stand Transfer   Sit-Stand [place] (Transfers) minimum assist (75% patient effort);1 person to manage equipment   Assistive Device (Sit-Stand Transfers) walker, front-wheeled   Clinical Impression   Criteria for Skilled Therapeutic Interventions Met (OT) Yes, treatment indicated   OT Diagnosis right MALENA   Influenced by the following impairments post surgical precautions   OT Problem List-Impairments impacting ADL activity tolerance impaired;post-surgical precautions   Assessment of Occupational Performance 1-3 Performance Deficits   Planned Therapy Interventions (OT) ADL retraining;progressive activity/exercise   Clinical Decision Making Complexity (OT) problem focused assessment/low complexity   Risk & Benefits of therapy have been explained risks/benefits reviewed   OT Total Evaluation Time   OT Eval, Low Complexity Minutes (87621) 15   OT Goals   Therapy Frequency (OT) Daily   OT Predicted Duration/Target Date for Goal Attainment 11/08/23   OT Goals Upper Body Dressing;Lower Body Dressing;Upper Body Bathing;Lower Body Bathing;Transfers;Toilet Transfer/Toileting   OT: Upper Body Dressing Supervision/stand-by assist   OT: Lower Body Dressing Supervision/stand-by assist   OT: Upper Body Bathing Supervision/stand-by assist   OT: Lower Body Bathing Supervision/stand-by assist   OT:  Transfer Supervision/stand-by assist   OT: Toilet Transfer/Toileting Supervision/stand-by assist   Interventions   Interventions Quick Adds Therapeutic Activity   Therapeutic Activities   Therapeutic Activity Minutes (00154) 25   Symptoms noted during/after treatment fatigue;dizziness   Treatment Detail/Skilled Intervention Pt ambulated to BR and to recliner with FWW and Min A, pt became dizzy upon walking to recliner, Pt was able to sit and reclined him where dizziness subsided quickly, RN pressent and addressed.   OT Discharge Planning   OT Plan cont OT   OT Discharge Recommendation (DC Rec) home with assist   OT Rationale for DC Rec Pt will likely have no further OT needs at time of D/C but will continue to assess   OT Brief overview of current status see above for details, pt has progressed well on day of surgery, up to recliner wiht some dizziness, but min assist to ambulate   Total Session Time   Timed Code Treatment Minutes 25   Total Session Time (sum of timed and untimed services) 40

## 2023-11-07 NOTE — ANESTHESIA CARE TRANSFER NOTE
Patient: Justino Wang    Procedure: Procedure(s):  ARTHROPLASTY, HIP, TOTAL, DIRECT ANTERIOR APPROACH       Diagnosis: Arthritis of right hip [M16.11]  Diagnosis Additional Information: No value filed.    Anesthesia Type:   Spinal     Note:    Oropharynx: oropharynx clear of all foreign objects and spontaneously breathing  Level of Consciousness: drowsy  Oxygen Supplementation: room air    Independent Airway: airway patency satisfactory and stable  Dentition: dentition unchanged  Vital Signs Stable: post-procedure vital signs reviewed and stable  Report to RN Given: handoff report given  Patient transferred to: PACU    Handoff Report: Identifed the Patient, Identified the Reponsible Provider, Reviewed the pertinent medical history, Discussed the surgical course, Reviewed Intra-OP anesthesia mangement and issues during anesthesia, Set expectations for post-procedure period and Allowed opportunity for questions and acknowledgement of understanding    Vitals:  Vitals Value Taken Time   BP 79/48 11/07/23 1224   Temp     Pulse 64 11/07/23 1226   Resp 15 11/07/23 1226   SpO2 93 % 11/07/23 1226   Vitals shown include unfiled device data.    Electronically Signed By: CRISTIAN Roland CRNA  November 7, 2023  12:27 PM

## 2023-11-07 NOTE — ANESTHESIA PROCEDURE NOTES
PENG Procedure Note    Pre-Procedure   Staff -        CRNA: Jefry Norwood APRN CRNA       Performed By: CRNA       Location: pre-op       Procedure Start/Stop Times: 11/7/2023 10:15 AM and 11/7/2023 10:20 AM       Pre-Anesthestic Checklist: patient identified, IV checked, site marked, risks and benefits discussed, informed consent, monitors and equipment checked, pre-op evaluation, at physician/surgeon's request and post-op pain management  Timeout:       Correct Patient: Yes        Correct Procedure: Yes        Correct Site: Yes        Correct Position: Yes        Correct Laterality: Yes        Site Marked: Yes  Procedure Documentation  Procedure: PENG       Diagnosis: POST OPERATIVE PAIN MANAGEMENT       Laterality: right       Patient Position: supine       Patient Prep/Sterile Barriers: sterile gloves, mask       Skin prep: Chloraprep       Local skin infiltrated with 0.5 mL of 1% lidocaine.        Needle Type: insulated and short bevel       Needle Gauge: 20.        Needle Length (Inches): 6        Ultrasound guided       1. Ultrasound was used to identify targeted nerve, plexus, vascular marker, or fascial plane and place a needle adjacent to it in real-time.       2. Ultrasound was used to visualize the spread of anesthetic in close proximity to the above referenced structure.       3. A permanent image is entered into the patient's record.       4. The visualized anatomic structures appeared normal.       5. There were no apparent abnormal pathologic findings.    Assessment/Narrative         The placement was negative for: blood aspirated, painful injection and site bleeding       Paresthesias: No.       Bolus given via needle..        Secured via.        Insertion/Infusion Method: Single Shot       Complications: none       Injection made incrementally with aspirations every 5 mL.    Medication(s) Administered   Bupivacaine 0.5% PF (Infiltration) - Infiltration   15 mL - 11/7/2023 10:15:00 AM  Bupivacaine  "0.25% PF (Infiltration) - Infiltration   20 mL - 11/7/2023 10:15:00 AM  Dexamethasone 10 mg/mL PF (Perineural) - Perineural   10 mg - 11/7/2023 10:15:00 AM  Medication Administration Time: 11/7/2023 10:15 AM      FOR [facility] ONLY:   Pain Team Contact information: please page the Pain Team Via [employer]. Search \"Pain\". During daytime hours, please page the attending first. At night please page the resident first.      "

## 2023-11-07 NOTE — PHARMACY-ADMISSION MEDICATION HISTORY
Pharmacist Admission Medication History    Admission medication history is complete. The information provided in this note is only as accurate as the sources available at the time of the update.    Information Source(s): Patient, Patient's pharmacy, and CareEverywhere/SureScripts via in-person    Pertinent Information: Patient spoke well on medications. Tamsulosin was prescribed by Lindsay Rosa 10/23/23, but [facility] had computer errors and medication was not started. Patient stated plans to follow up with PCP regarding new prescription    Changes made to PTA medication list:  Added: None  Deleted: None  Changed: APAP to BID (scheduled per patient)    Medication Affordability:  Not including over the counter (OTC) medications, was there a time in the past 3 months when you did not take your medications as prescribed because of cost?: No    Allergies reviewed with patient and updates made in EHR: yes- NKDA    Medication History Completed By: Tiffanie Schulz RPH 11/7/2023 2:04 PM

## 2023-11-07 NOTE — PROGRESS NOTES
11/07/23 1556   Appointment Info   Signing Clinician's Name / Credentials (PT) Chris Neal MPT   Living Environment   People in Home spouse   Current Living Arrangements apartment   Home Accessibility no concerns   Number of Stairs, Main Entrance none   Transportation Anticipated family or friend will provide;car, drives self   Self-Care   Usual Activity Tolerance good   Current Activity Tolerance fair   Equipment Currently Used at Home walker, rolling   Fall history within last six months no   General Information   Referring Physician Rachana   Patient/Family Therapy Goals Statement (PT) return home   Existing Precautions/Restrictions fall  (s/p right MALENA)   Weight-Bearing Status - LLE full weight-bearing   Weight-Bearing Status - RLE weight-bearing as tolerated   Cognition   Affect/Mental Status (Cognition) WFL   Orientation Status (Cognition) oriented x 4   Follows Commands (Cognition) WFL   Pain Assessment   Patient Currently in Pain Yes, see Vital Sign flowsheet   Integumentary/Edema   Integumentary/Edema Comments surgical wound right hip area   Posture    Posture Not impaired   Range of Motion (ROM)   Range of Motion ROM is WFL   Strength (Manual Muscle Testing)   Strength (Manual Muscle Testing) strength is WFL   Bed Mobility   Impairments Contributing to Impaired Bed Mobility pain;decreased strength   Comment, (Bed Mobility) moderate assist for supine<>sit   Transfers   Impairments Contributing to Impaired Transfers pain;decreased strength   Comment, (Transfers) minimal assist for sit to stand; CGA for stand pivot with fww   Gait/Stairs (Locomotion)   [place] Level (Gait) contact guard   Assistive Device (Gait) walker, front-wheeled   Distance in Feet (Gait) 20   Pattern (Gait) step-to   Balance   Balance Comments fair with Fww   Sensory Examination   Sensory Perception WFL   Coordination   Coordination no deficits were identified   Muscle Tone   Muscle Tone no deficits were identified   Clinical  Impression   Criteria for Skilled Therapeutic Intervention Yes, treatment indicated   PT Diagnosis (PT) impaired mobility   Influenced by the following impairments fatigue, pain and weakness   Functional limitations due to impairments activitiy/gait tolerance and stability   Clinical Presentation (PT Evaluation Complexity) stable   Clinical Decision Making (Complexity) low complexity   Planned Therapy Interventions (PT) bed mobility training;gait training;home exercise program;transfer training   Risk & Benefits of therapy have been explained evaluation/treatment results reviewed;patient;spouse/significant other   PT Total Evaluation Time   PT Eval, Low Complexity Minutes (00993) 25   Physical Therapy Goals   PT Frequency Daily   PT Predicted Duration/Target Date for Goal Attainment 11/09/23   PT Goals Bed Mobility;Transfers;Gait   PT: Bed Mobility Supervision/stand-by assist   PT: Transfers Supervision/stand-by assist;Assistive device   PT: Gait Supervision/stand-by assist;Rolling walker;150 feet   PT Discharge Planning   PT Plan Continue PT   PT Discharge Recommendation (DC Rec) home with assist;home with outpatient physical therapy   PT Rationale for DC Rec to promote strength, stability and safe mobility   PT Brief overview of current status assist for all mobilities using a Fww for gait; orthostatic hypotension noted upon PT eval; patient seated in reclined position; with BP slowly increasing during time of PT (see RN note)   PT Equipment Needed at Discharge walker, rolling

## 2023-11-07 NOTE — ANESTHESIA PROCEDURE NOTES
"Intrathecal injection Procedure Note    Pre-Procedure   Staff -        CRNA: Juan Payne APRN CRNA       Performed By: CRNA       Location: OR       Procedure Start/Stop Times: 11/7/2023 10:58 AM and 11/7/2023 11:01 AM       Pre-Anesthestic Checklist: patient identified, IV checked, risks and benefits discussed, informed consent, monitors and equipment checked, pre-op evaluation and at physician/surgeon's request  Timeout:       Correct Patient: Yes        Correct Procedure: Yes        Correct Site: Yes        Correct Position: Yes   Procedure Documentation  Procedure: intrathecal injection       Diagnosis: Primary Anesthetic       Patient Position: sitting       Patient Prep/Sterile Barriers: sterile gloves, mask       Skin prep: Chloraprep       Insertion Site: L3-4. (right paramedian approach).       Needle Gauge: 22.        Needle Length (Inches): 3.5        Spinal Needle Type: Rashawn tipNo introducer used       # of attempts: 1 and  # of redirects:  0    Assessment/Narrative         Paresthesias: No.       CSF fluid: clear.    Medication(s) Administered   Medication Administration Time: 11/7/2023 10:58 AM     Comments:  No complications encountered. Pt tolerated procedure well.       FOR [facility] ([facility]) ONLY:   Pain Team Contact information: please page the Pain Team Via [employer]. Search \"Pain\". During daytime hours, please page the attending first. At night please page the resident first.      "

## 2023-11-07 NOTE — OR NURSING
PACU Transfer Note    Justino Wang was transferred to [facility] via bed.  Equipment used for transport:  none.  Accompanied by:  krystina ambrosio and Krystina quesada  Prescriptions were: none    PACU Respiratory Event Documentation     1) Episodes of Apnea greater than or equal to 10 seconds: 0    2) Bradypnea - less than 8 breaths per minute: 0    3) Pain score on 0 to 10 scale: 2-3/10    4) Pain-sedation mismatch (yes or no): no    5) Repeated 02 desaturation less than 90% (yes or no): no    Anesthesia notified? (yes or no): no    Any of the above events occuring repeatedly in separate 30 minute intervals may be considered recurrent PACU respiratory events.    Patient stable and meets phase 1 discharge criteria for transport from PACU.      Report to KRYSTINA Moreno

## 2023-11-07 NOTE — OR NURSING
Prior to the start of the procedure and with procedural staff participation, I verbally confirmed the patient s identity using two indicators, relevant allergies, that the procedure was appropriate and matched the consent or emergent situation, and that the correct equipment/implants were available. Immediately prior to starting the procedure I conducted the Time Out with the procedural staff and re-confirmed the patient s name, procedure, and site/side. (The Joint Commission universal protocol was followed.)  Yes    Cathryn Lynch RN on 11/7/2023 at 10:05 AM

## 2023-11-08 ENCOUNTER — APPOINTMENT (OUTPATIENT)
Dept: OCCUPATIONAL THERAPY | Facility: OTHER | Age: 78
End: 2023-11-08
Attending: ORTHOPAEDIC SURGERY
Payer: MEDICARE

## 2023-11-08 ENCOUNTER — APPOINTMENT (OUTPATIENT)
Dept: PHYSICAL THERAPY | Facility: OTHER | Age: 78
End: 2023-11-08
Attending: ORTHOPAEDIC SURGERY
Payer: MEDICARE

## 2023-11-08 VITALS
HEIGHT: 68 IN | BODY MASS INDEX: 34.4 KG/M2 | TEMPERATURE: 98.1 F | HEART RATE: 56 BPM | RESPIRATION RATE: 16 BRPM | WEIGHT: 227 LBS | DIASTOLIC BLOOD PRESSURE: 56 MMHG | OXYGEN SATURATION: 95 % | SYSTOLIC BLOOD PRESSURE: 119 MMHG

## 2023-11-08 LAB
HGB BLD-MCNC: 11.3 G/DL (ref 13.3–17.7)
HOLD SPECIMEN: NORMAL

## 2023-11-08 PROCEDURE — 258N000003 HC RX IP 258 OP 636: Performed by: ORTHOPAEDIC SURGERY

## 2023-11-08 PROCEDURE — 97530 THERAPEUTIC ACTIVITIES: CPT | Mod: GP

## 2023-11-08 PROCEDURE — 250N000011 HC RX IP 250 OP 636: Performed by: ORTHOPAEDIC SURGERY

## 2023-11-08 PROCEDURE — 97535 SELF CARE MNGMENT TRAINING: CPT | Mod: GO | Performed by: OCCUPATIONAL THERAPIST

## 2023-11-08 PROCEDURE — 97110 THERAPEUTIC EXERCISES: CPT | Mod: GP

## 2023-11-08 PROCEDURE — 250N000013 HC RX MED GY IP 250 OP 250 PS 637: Performed by: ORTHOPAEDIC SURGERY

## 2023-11-08 PROCEDURE — 99212 OFFICE O/P EST SF 10 MIN: CPT | Mod: 24 | Performed by: HOSPITALIST

## 2023-11-08 PROCEDURE — 96376 TX/PRO/DX INJ SAME DRUG ADON: CPT

## 2023-11-08 PROCEDURE — 85018 HEMOGLOBIN: CPT | Performed by: ORTHOPAEDIC SURGERY

## 2023-11-08 PROCEDURE — 97116 GAIT TRAINING THERAPY: CPT | Mod: GP

## 2023-11-08 PROCEDURE — 36415 COLL VENOUS BLD VENIPUNCTURE: CPT | Performed by: ORTHOPAEDIC SURGERY

## 2023-11-08 RX ORDER — TAMSULOSIN HYDROCHLORIDE 0.4 MG/1
0.4 CAPSULE ORAL DAILY
Qty: 90 CAPSULE | Refills: 3 | Status: SHIPPED | OUTPATIENT
Start: 2023-11-08 | End: 2024-01-18

## 2023-11-08 RX ADMIN — ACETAMINOPHEN 975 MG: 325 TABLET, FILM COATED ORAL at 10:05

## 2023-11-08 RX ADMIN — HYDROXYZINE HYDROCHLORIDE 10 MG: 10 TABLET ORAL at 00:45

## 2023-11-08 RX ADMIN — ASPIRIN 81 MG: 81 TABLET, COATED ORAL at 10:05

## 2023-11-08 RX ADMIN — LEVOTHYROXINE SODIUM 100 MCG: 0.1 TABLET ORAL at 07:42

## 2023-11-08 RX ADMIN — SENNOSIDES AND DOCUSATE SODIUM 1 TABLET: 50; 8.6 TABLET ORAL at 10:05

## 2023-11-08 RX ADMIN — OXYCODONE HYDROCHLORIDE 5 MG: 5 TABLET ORAL at 07:42

## 2023-11-08 RX ADMIN — TAMSULOSIN HYDROCHLORIDE 0.4 MG: 0.4 CAPSULE ORAL at 10:05

## 2023-11-08 RX ADMIN — Medication 2 G: at 04:23

## 2023-11-08 RX ADMIN — POLYETHYLENE GLYCOL 3350 17 G: 17 POWDER, FOR SOLUTION ORAL at 10:05

## 2023-11-08 RX ADMIN — ACETAMINOPHEN 975 MG: 325 TABLET, FILM COATED ORAL at 00:45

## 2023-11-08 RX ADMIN — SODIUM CHLORIDE, POTASSIUM CHLORIDE, SODIUM LACTATE AND CALCIUM CHLORIDE: 600; 310; 30; 20 INJECTION, SOLUTION INTRAVENOUS at 03:30

## 2023-11-08 RX ADMIN — OXYCODONE HYDROCHLORIDE 5 MG: 5 TABLET ORAL at 01:55

## 2023-11-08 ASSESSMENT — ACTIVITIES OF DAILY LIVING (ADL)
ADLS_ACUITY_SCORE: 30

## 2023-11-08 NOTE — PLAN OF CARE
Goal Outcome Evaluation:       Patient admitted post R hip replacement. Pain at a tolerable level to patient. Pain has been treated with PRN pain medications which was effective for relief.     The patients BP has remained stable since last nurses note. Patient is currently up to chair with the assist of PT. Patient tolerating regular diet with no complaints of nausea.     CMS intact to right lower extremity. Dressing on right hip is clean, dry, and intact.

## 2023-11-08 NOTE — PROGRESS NOTES
11/08/23 1412   Appointment Info   Signing Clinician's Name / Credentials (PT) Chris Neal MPT   Interventions   Interventions Quick Adds Gait Training;Therapeutic Activity;Therapeutic Procedure   Therapeutic Procedure/Exercise   Treatment Detail/Skilled Intervention review of home exercises and ambulation at home prior to the beginning of patient's OP PT   Therapeutic Activity   Symptoms Noted During/After Treatment Fatigue;Increased pain   Treatment Detail/Skilled Intervention minimal assist for right LE support with supine<>sit; review of proper vehicle transfers   Gait Training   Symptoms Noted During/After Treatment (Gait Training) fatigue;increased pain   Treatment Detail/Skilled Intervention ambulation in hallway   Distance in Feet 150   [place] Level (Gait Training) stand-by assist   Physical Assistance Level (Gait Training) supervision   Weight Bearing (Gait Training) weight-bearing as tolerated   Assistive Device (Gait Training) rolling walker   Pattern Analysis (Gait Training) swing-to gait   Gait Analysis Deviations decreased vinicio;decreased velocity of limb motion;decreased step length   Impairments (Gait Analysis/Training) balance impaired;pain;strength decreased   PT Discharge Planning   PT Plan patient discharging   PT Discharge Recommendation (DC Rec) home with assist;home with outpatient physical therapy   PT Rationale for DC Rec to promote strength, stability and safe mobility   PT Brief overview of current status minimal assist for bed mobilities; SBA for transfers and ambulation using a Fww; fair pain control; good family support; patient has outpatient PT scheduled   PT Equipment Needed at Discharge walker, rolling

## 2023-11-08 NOTE — PHARMACY - DISCHARGE MEDICATION RECONCILIATION AND EDUCATION
Pharmacy:  Discharge Counseling and Medication Reconciliation    Justino Wang  [address]   [facility] [id number] [phone] (home)   78 year old male  PCP: [facility]    Allergies: Patient has no known allergies.    Discharge Counseling:    Pharmacist met with patient (and/or family) today to review the medication portion of the After Visit Summary (with an emphasis on NEW medications) and to address patient's questions/concerns.    Summary of Education: Counseled on the appropriate use of opioids for pain management. Explained the dangers of commitment use of opioids with alcohol and activities requiring mental acuity. Included potential side effects of opioids (drowsiness, altered mental status, constipation). Discussed appropriate use of APAP for mild pain. Discussed how to manage opioid induced constipation with Senna-Doc. Explained the benefit of hydroxyzine use for pain and potential added sedation effects used with oxycodone. Gave multiple reminders to not exceed 4000mg of APAP daily since patient already used to taking APAP 1000mg BID also to only take 2 baby aspirin per day.     Patient requested single fill of tamsulosin be sent to [facility] pharmacy for convenience Patient normally fills at [facility] but wants [facility] for post op.        Materials Provided:  MedCounselor sheets printed from Clinical Pharmacology on: Senna Docusate, APAP, Oxycodone, Hydroxyzine     Discharge Medication Reconciliation:    It has been determined that the patient has an adequate supply of medications available or which can be obtained from the patient's preferred pharmacy, which HE/SHE has confirmed as: [facility] [place].    Thank you for the consult.    [facility], located within [facility]........November 8, 2023 11:11 AM

## 2023-11-08 NOTE — PROGRESS NOTES
11/08/23 1418   Appointment Info   Signing Clinician's Name / Credentials (OT) Kari Rg, OTR/L   Interventions   Interventions Quick Adds Self-Care/Home Management   Self-Care/Home Management   Self-Care/Home Mgmt/ADL, Compensatory, Meal Prep Minutes (02935) 45   Symptoms Noted During/After Treatment (Meal Preparation/Planning Training) fatigue;increased pain   [place] Level (Upper Body Dressing Training) stand-by assist   Assistance (Upper Body Dressing Training) supervision   Lower Body Dressing Training Assistance maximum assist (25% patient effort)   OT Discharge Planning   OT Plan d/c home with family assist as needed   OT Discharge Recommendation (DC Rec) home with assist   OT Rationale for DC Rec Pt has no further OT needed at this time   OT Brief overview of current status Pt has progressed slowly, feels ready to go home and has outpatient therapy set up and spouse/family assist as needed   OT Equipment Needed at Discharge   (has all necessary equipment)   Total Session Time   Timed Code Treatment Minutes 45   Total Session Time (sum of timed and untimed services) 45

## 2023-11-08 NOTE — PLAN OF CARE
A&O, vitals have been stable this shift. Compared sitting BP to standing BP, only difference was diastolic decreased 5. Patient has been reporting more discomfort than pain during the shift. Patient specifically reported pain where it looks like there was an injection on right hip area.       Problem: Adult Inpatient Plan of Care  Goal: Optimal Comfort and Wellbeing  Outcome: Progressing   Goal Outcome Evaluation:

## 2023-11-08 NOTE — DISCHARGE SUMMARY
"[facility]  Hospitalist Discharge Summary      Date of Admission:  11/7/2023  Date of Discharge:  11/8/2023  Discharging Provider: Radha Carrillo MD  Discharge Service: Hospitalist Service    Discharge Diagnoses   Status post right hip arthroplasty     Clinically Significant Risk Factors     # Obesity: Estimated body mass index is 34.52 kg/m  as calculated from the following:    Height as of this encounter: 1.727 m (5' 8\").    Weight as of this encounter: 103 kg (227 lb).       Follow-ups Needed After Discharge   Follow-up Appointments     Follow Up Care      Follow-up with your Surgeon Team in 2 weeks for wound check.            Unresulted Labs Ordered in the Past 30 Days of this Admission       No orders found for last 31 day(s).            Discharge Disposition   Discharged to home  Condition at discharge: Stable    Hospital Course   Justino Wang is a 78 year old male admitted on 11/7/2023. He has a history of hypothyroidism, hyperlipidemia, osteoarthritis and BPH was admitted after right total hip arthroplasty by Dr. Reich.  The patient was observed overnight.  He did well.  He was evaluated by PT/OT.  Initially after surgery his blood pressure went up but it came down nicely with pain control.  On POD #1 he is doing good and is working with the physical therapy.  He is being discharged home in a stable condition to follow-up with orthopedics.  Aspirin for DVT prophylaxis.  He has the following acute medical issues:     Status post right hip arthroplasty: POD #1.  Postop management as per Dr. Reich.  PT/OT input is appreciated pain control.  Prevent constipation.  DVT prophylaxis with aspirin.     Hyperlipidemia: Continue atorvastatin.     Hypothyroidism: Continue on levothyroxine.  Last TSH was on 10/23/2023 that was normal.     Possible urinary retention: The patient was started on tamsulosin with good response.  He will continue to take it as an outpatient.    Hypertension: Postop " "his blood pressure went up but came down nicely with pain control and help with urinary retention.  His blood pressure today is 119/56.    Consultations This Hospital Stay   HOSPITALIST IP CONSULT  PHYSICAL THERAPY ADULT IP CONSULT  OCCUPATIONAL THERAPY ADULT IP CONSULT    Code Status   Full Code    Time Spent on this Encounter   IRadha MD, personally saw the patient today and spent greater than 30 minutes discharging this patient.       Radha Carrillo MD  [facility]  [address]  [place] [address]  Phone: [phone]  Fax: [phone]  ______________________________________________________________________    Physical Exam   Vital Signs: Temp: 98.1  F (36.7  C) Temp src: Tympanic BP: 119/56 Pulse: 56   Resp: 16 SpO2: 95 % O2 Device: None (Room air) Oxygen Delivery: 2 LPM  Weight: 227 lbs 0 oz  GENERAL APPEARANCE: In no acute distress.  HEENT: No oropharyngeal lesions.  NECK: Supple.   CHEST: Symmetric. Nontender to palpation.  LUNGS: B/l CTA  HEART: S1+S2 [place]  ABDOMEN: Soft, flat, and benign. BS +ve  EXTREMITIES: No cyanosis, clubbing, or edema.  NEUROLOGIC: Grossly nonfocal examination  PSYCHIATRIC: Appropriate mood and affect.  SKIN: No new rashes.              Primary Care Physician   [facility]    Discharge Orders      Reason for your hospital stay    Status Post Right Total Hip Arthroplasty     When to call - Contact Surgeon Team    You may experience symptoms that require follow-up before your scheduled appointment. Refer to the \"Stoplight Tool\" for instructions on when to contact your Surgeon Team if you are concerned about pain control, blood clots, constipation, or if you are unable to urinate.     When to call - Reach out to Urgent Care    If you are not able to reach your Surgeon Team and you need immediate care, go to the Orthopedic Walk-in Clinic or Urgent Care at your Surgeon's office.  Do NOT go to the Emergency Room unless you have shortness of " breath, chest pain, or other signs of a medical emergency.     When to call - Reasons to Call 911    Call 911 immediately if you experience sudden-onset chest pain, arm weakness/numbness, slurred speech, or shortness of breath     Discharge Instruction - Breathing exercises    Perform breathing exercises using your Incentive Spirometer 10 times per hour while awake for 2 weeks.     Symptoms - Fever Management    A low grade fever can be expected after surgery.  Use acetaminophen (TYLENOL) as needed for fever management.  Contact your Surgeon Team if you have a fever greater than 101.5 F, chills, and/or night sweats.     Symptoms - Constipation management    Constipation (hard, dry bowel movements) is expected after surgery due to the combination of being less active, the anesthetic, and the opioid pain medication.  You can do the following to help reduce constipation:  ~  FLUIDS:  Drink clear liquids (water or Gatorade), or juice (apple/prune).  ~  DIET:  Eat a fiber rich diet.    ~  ACTIVITY:  Get up and move around several times a day.  Increase your activity as you are able.  MEDICATIONS:  Reduce the risk of constipation by starting medications before you are constipated.  You can take Miralax   (1 packet as directed) and/or a stool softener (Senokot 1-2 tablets 1-2 times a day).  If you already have constipation and these medications are not working, you can get magnesium citrate and use as directed.  If you continue to have constipation you can try an over the counter suppository or enema.  Call your Surgeon Team if it has been greater than 3 days since your last bowel movement.     Symptoms - Reduced Urine Output    Changes in the amount of fluids you drank before and after surgery may result in problems urinating.  It is important to stay well-hydrated after surgery and drink plenty of water. If it has been greater than 8 hours since you have urinated despite drinking plenty of water, call your Surgeon Team.  "    Activity - Exercises to prevent blood clots    Unless otherwise directed by your Surgeon team, perform the following exercises at least three times per day for the first four weeks after surgery to prevent blood clots in your legs: 1) Point and flex your feet (Ankle Pumps), 2) Move your ankle around in big circles, 3) Wiggle your toes, 4) Walk, even for short distances, several times a day, will help decrease the risk of blood clots.     Comfort and Pain Management - Pain after Surgery    Pain after surgery is normal and expected.  You will have some amount of pain for several weeks after surgery.  Your pain will improve with time.  There are several things you can do to help reduce your pain including: rest, ice, elevation, and using pain medications as needed. Contact your Surgeon Team if you have pain that persists or worsens after surgery despite rest, ice, elevation, and taking your medication(s) as prescribed. Contact your Surgeon Team if you have new numbness, tingling, or weakness in your operative extremity.     Comfort and Pain Management - Swelling after Surgery    Swelling and/or bruising of the surgical extremity is common and may persist for several months after surgery. In addition to frequent icing and elevation, gentle compressive support with an ACE wrap or tubigrip may help with swelling. Apply compression regularly, removing at least twice daily to perform skin checks. Contact your Surgeon Team if your swelling increases and is NOT associated with an increase in your activity level, or if your swelling increases and is associated with redness and pain.     Comfort and Pain Management - LOWER Extremity Elevation    Swelling is expected for several months after surgery. This type of swelling is usually associated with gravity and activity, and can be improved with elevation.   The best way to do this is to get your \"toes above your nose\" by laying down and placing several pillows lengthwise " under your calf and heel. When elevating your leg keep your knee completely straight. Perform this elevation as often as possible especially for the first two weeks after surgery.     Comfort and Pain Management - Cold therapy    Ice can be used to control swelling and discomfort after surgery. Place a thin towel over your operative site and apply the ice pack overtop. Leave ice pack in place for 20 minutes, then remove for 20 minutes. Repeat this 20 minutes on/20 minutes off routine as often as tolerated.     Medication Instructions - Acetaminophen (TYLENOL) Instructions    You were discharged with acetaminophen (TYLENOL) for pain management after surgery. Acetaminophen most effectively manages pain symptoms when it is taken on a schedule without missing doses (every four, six, or eight hours). Your Provider will prescribe a safe daily dose between 3000 - 4000 mg.  Do NOT exceed this daily dose. Most patients use acetaminophen for pain control for the first four weeks after surgery.  You can wean from this medication as your pain decreases.     Medication Instructions - NSAID Instructions    You were discharged with an anti-inflammatory medication for pain management to use in combination with acetaminophen (TYLENOL) and the narcotic pain medication.  Take this medication exactly as directed.  You should only take one anti-inflammatory at a time.  Some common anti-inflammatories include: ibuprofen (ADVIL, MOTRIN), naproxen (ALEVE, NAPROSYN), celecoxib (CELEBREX), meloxicam (MOBIC), ketorolac (TORADOL).  Take this medication with food and water.     Medication Instructions - Opioids - Tapering Instructions    In the first three days following surgery, your symptoms may warrant use of the narcotic pain medication every four to six hours as prescribed. This is normal. As your pain symptoms improve, focus your efforts on decreasing (tapering) use of narcotic medications. The most successful tapering strategy is to  "first, decrease the number of tablets you take every 4-6 hours to the minimum prescribed. Then, increase the amount of time between doses.  For example:  First, taper to   or 1 tablet every 4-6 hours.  Then, taper to   or 1 tablet every 6-8 hours.  Then, taper to   or 1 tablet every 8-10 hours.  Then, taper to   or 1 tablet every 10-12 hours.  Then, taper to   or 1 tablet at bedtime.  The bedtime dose can help with comfort during sleep and is typically the last dose to be discontinued after surgery.     Follow Up Care    Follow-up with your Surgeon Team in 2 weeks for wound check.     Activity - Total Hip Arthroplasty    Refer to the [facility] \"Your Guide to Total Joint Replacement\" for recommendations on activities and Exercises.     Return to Driving    Return to driving - Driving is NOT permitted until directed by your provider. Under no circumstance are you permitted to drive while using narcotic pain medications.     Dressing / Wound Care - Wound    You have a clean dressing on your surgical wound. Dressing change instructions as follows: dressing will be removed at your follow-up appointment. Contact your Surgeon Team if you have increased redness, warmth around the surgical wound, and/or drainage from the surgical wound.     Dressing / Wound Care - NO Tub Bathing    Tub bathing, swimming, or any other activities that will cause your incision to be submerged in water should be avoided until allowed by your Surgeon.     Medication instructions -  Anticoagulation - aspirin    Take the aspirin as prescribed for a total of four weeks after surgery.  This is given to help minimize your risk of blood clot.     Medication Instructions - Opioid Instructions (Greater than or equal to 65 years)    You were discharged with an opioid medication (hydromorphone, oxycodone, hydrocodone, or tramadol). This medication should only be taken for breakthrough pain that is not controlled with acetaminophen (TYLENOL). If you " rate your pain less than 3 you do not need this medication.  Pain rating 0-3:  You do not need this medication  Pain rating 4-6:  Take 1/2 tablet every 4-6 hours as needed  Pain rating 7-10:  Take 1 tablet every 4-6 hours as needed  Do not exceed 4 tablets per day     No precautions    No precautions directed by your Provider.  You may perform range of motion activities as tolerated.     Weight bearing as tolerated    Weight bearing as tolerated on your operative extremity.     Dressing / Wound care - Shower with wound/dressing covered    You must COVER your dressing or incision with saran wrap (or any other non-permeable covering) to allow the incision to remain dry while showering.  You may shower 3 days after surgery as long as the surgical wound stays dry. Continue to cover your dressing or incision for showering until your first office visit.  You are strictly prohibited from soaking   or submerging the surgical wound underwater.     Discharge Instruction - Regular Diet Adult    Return to your pre-surgery diet unless instructed otherwise       Significant Results and Procedures   Most Recent 3 CBC's:  Recent Labs   Lab Test 11/08/23  0547 10/23/23  1200 11/30/20  1153   WBC  --  6.0 5.1   HGB 11.3* 14.0 14.4   MCV  --  97 98   PLT  --  265 269     Most Recent 3 BMP's:  Recent Labs   Lab Test 11/07/23  0854 10/23/23  1200 01/09/23  1149 01/03/22  1625   NA  --  139 140 138   POTASSIUM  --  4.2 4.3 4.5   CHLORIDE  --  105 105 104   CO2  --  24 27 27   BUN  --  13.7 12.8 16   CR  --  0.84 0.74 0.83   ANIONGAP  --  10 8 7   TIERA  --  9.3 9.1 9.6   GLC 97 103* 105* 99   ,   Results for orders placed or performed during the hospital encounter of 11/07/23   XR Pelvis Port 1/2 Views    Narrative    XR PELVIS PORT 1/2 VIEWS    HISTORY: 78 years Male post op    COMPARISON: None    TECHNIQUE: A single view pelvis was performed    FINDINGS: Patient is postop right total hip arthroplasty. Conclusions  stable to present.  There is soft tissue air. Alignment is anatomic. No  evidence of fracture.      Impression    IMPRESSION: Postop right total hip arthroplasty without evidence of  complication.    LES PEARCE MD         SYSTEM ID:  [id number]       Discharge Medications   Current Discharge Medication List        START taking these medications    Details   !! acetaminophen (TYLENOL) 325 MG tablet Take 2 tablets (650 mg) by mouth every 4 hours as needed for other (mild pain)  Qty: 100 tablet, Refills: 0    Associated Diagnoses: Right hip pain      !! aspirin 81 MG EC tablet Take 1 tablet (81 mg) by mouth 2 times daily  Qty: 60 tablet, Refills: 0    Associated Diagnoses: Right hip pain      hydrOXYzine (ATARAX) 10 MG tablet Take 1 tablet (10 mg) by mouth every 6 hours as needed for itching or anxiety (with pain, moderate pain)  Qty: 30 tablet, Refills: 0    Associated Diagnoses: Right hip pain      oxyCODONE (ROXICODONE) 5 MG tablet Take 1 tablet (5 mg) by mouth every 4 hours as needed for moderate to severe pain  Qty: 20 tablet, Refills: 0    Associated Diagnoses: Right hip pain      senna-docusate (SENOKOT-S/PERICOLACE) 8.6-50 MG tablet Take 1-2 tablets by mouth 2 times daily Take while on oral narcotics to prevent or treat constipation.  Qty: 30 tablet, Refills: 0    Comments: While taking narcotics  Associated Diagnoses: Right hip pain       !! - Potential duplicate medications found. Please discuss with provider.        CONTINUE these medications which have NOT CHANGED    Details   !! acetaminophen (TYLENOL) 500 MG tablet Take 1,000 mg by mouth 2 times daily      !! aspirin EC 81 MG EC tablet Take 81 mg by mouth daily with food Take 1 tablet by mouth once daily with a meal.      atorvastatin (LIPITOR) 80 MG tablet Take 1 tablet (80 mg) by mouth daily (with dinner)  Qty: 90 tablet, Refills: 4    Associated Diagnoses: Elevated coronary artery calcium score      levothyroxine (SYNTHROID/LEVOTHROID) 100 MCG tablet Take 1 tablet  (100 mcg) by mouth every morning (before breakfast) Take 1 tablet by mouth before breakfast.  Qty: 90 tablet, Refills: 4    Associated Diagnoses: Hypothyroidism following radioiodine therapy      tamsulosin (FLOMAX) 0.4 MG capsule Take 1 capsule (0.4 mg) by mouth daily  Qty: 90 capsule, Refills: 0    Associated Diagnoses: Benign prostatic hyperplasia with urinary frequency       !! - Potential duplicate medications found. Please discuss with provider.        Allergies   No Known Allergies

## 2023-11-08 NOTE — PROGRESS NOTES
Subjective: The patient is POD #1 s/p R Total Hip Arthroplasty.  The patients pain is controlled fairly well.  They deny shortness of breath, chest pain, nausea or vomiting.  There have been no acute perioperative complications    Objective:   B/P: 119/56, Temp: 98.1, Pulse: 56, Resp: 16    Alert and Orientated x3; No Acute Distress; Appears comfortable     Hip Exam: dressing/wound is clean, dry, intact without significant drainage.  No erythema or signs of infection.     No evidence of DVT    Distal motor/sensory exam is intact in the superficial peroneal, deep peroneal and tibial nerve distributions.      Normal capillary refill with a palpable dorsalis pedis pulse.    Hemoglobin   Date Value Ref Range Status   11/08/2023 11.3 (L) 13.3 - 17.7 g/dL Final   11/30/2020 14.4 13.3 - 17.7 g/dL Final       Assessment/Plan:     1) POD # 1 S/P R Total Hip Arthroplasty  -Pain controlled  -PT/OT--- Strengthening and Gait training  -WBAT; No Hip ROM Precautions  -DVT prophylaxis with aspirin  -Dispo--To home when cleared Medically and by PT.   -Follow-Up [facility] in 2 weeks  -Hospitalist co-management

## 2023-11-09 ENCOUNTER — PATIENT OUTREACH (OUTPATIENT)
Dept: INTERNAL MEDICINE | Facility: OTHER | Age: 78
End: 2023-11-09
Payer: MEDICARE

## 2023-11-09 NOTE — TELEPHONE ENCOUNTER
Transitional Care Management Phone Call    Summary of hospitalization:  [facility] discharge summary reviewed     Diagnostic Tests/Treatments reviewed.  Follow up needed:   Follow-ups Needed After Discharge  Follow-up Appointments     Follow Up Care      Follow-up with your Surgeon Team in 2 weeks for wound check.      Post Discharge Medication Reconciliation: discharge medications reconciled, continue medications without change.  Medications reviewed by: by myself    Problems taking medications regularly:  None  Problems adhering to non-medication therapy:  None    Other Healthcare Providers Involved in Patient s Care:         Surgical follow-up appointment - 11/20/2023 10:30 Aris Francis MD and Physical Therapy  Update since discharge: improved.   Plan of care communicated with patient    Was the patient contacted within the 2 business days or other approved timeframe?  Yes  Was the Medication reconciliation and management done since the patient was discharged? Yes    Agnieszka Martin RN  11/9/2023 1:29 PM

## 2023-11-14 ENCOUNTER — THERAPY VISIT (OUTPATIENT)
Dept: PHYSICAL THERAPY | Facility: OTHER | Age: 78
End: 2023-11-14
Attending: ORTHOPAEDIC SURGERY
Payer: MEDICARE

## 2023-11-14 DIAGNOSIS — M16.11 ARTHRITIS OF RIGHT HIP: ICD-10-CM

## 2023-11-14 PROCEDURE — 97161 PT EVAL LOW COMPLEX 20 MIN: CPT | Mod: GP

## 2023-11-14 PROCEDURE — 97110 THERAPEUTIC EXERCISES: CPT | Mod: GP

## 2023-11-14 NOTE — PROGRESS NOTES
PHYSICAL THERAPY EVALUATION  Type of Visit: Evaluation    See electronic medical record for Abuse and Falls Screening details.    Subjective       Presenting condition or subjective complaint: Right total hip replacement with Dr. Reich. Went home next day. Doing some exercises at home. Trying to walk halls at [facility] with a goal of 4 times a day. Yesterday went through all of the exercises he did in prehab in one session and got incredibly sore and had sharp pain, so took 4 oxycodone where previously he'd been only needing 0-2 per day. Icing some but not able to get his leg elevated much.  Date of onset: 11/07/23    Relevant medical history: Bladder or bowel problems   Dates & types of surgery:  elbow ORIF, melanoma removal, R MALENA    Prior diagnostic imaging/testing results:     see electronic medical record  Prior therapy history for the same diagnosis, illness or injury: No left elbow surgery, melanoma removal on back    Prior Level of Function  Transfers: Independent  Ambulation: Independent  ADL: Independent    Living Environment  Social support: With a significant other or spouse   Type of home: Apartment/condo (Independent living at [facility])  Help at home: None  Equipment owned: Grab bars; Walker     Employment: No    Hobbies/[place], [place]    Patient goals for therapy: walking without AD, stairs, getting in/out of bed himself, driving    Pain assessment: Pain present  See objective evaluation for additional pain details     Objective   HIP EVALUATION  PAIN: Pain Level at Rest: 1/10  Pain Level with Use: 8/10  Pain Location: hip and front and in joint  Pain Quality: Dull, Sharp, and Stabbing  Pain is Exacerbated By: sitting, walking  Pain is Relieved By: cold, rest, and oxycodone  GAIT:   Weightbearing Status: WBAT  Assistive Device(s): Walker (front wheeled)  Gait Deviations: Antalgic  Stance time decreased  Stride length decreased  BALANCE/PROPRIOCEPTION:   impaired    ROM:   (Degrees) Right PROM   Hip Flexion 77   Hip Extension    Hip Abduction 10   Hip Adduction    Hip Internal Rotation minimal   Hip External Rotation minimal   Pain: all motions  STRENGTH:  deferred due to pain and recent surgery; unable to transfer sit to/from supine without assist so less than antigravity strength  LE FLEXIBILITY:  impaired throughout due to pain, swelling, stiffness post surgical  FUNCTIONAL TESTS:  transfer sitting to/from supine with moderate assist of 1 for RLE and trunk  JOINT MOBILITY: impaired s/p R MALENA    Assessment & Plan   CLINICAL IMPRESSIONS  Medical Diagnosis: Arthritis of right hip (M16.11)    Treatment Diagnosis: impaired mobility, flexibility and strength s/p MALENA   Impression/Assessment: Patient is a 78 year old male with right hip pain complaints s/p right MALENA on 11/7/23.  The following significant findings have been identified: Pain, Decreased ROM/flexibility, Decreased joint mobility, Decreased strength, Impaired balance, Decreased proprioception, Impaired sensation, Inflammation, Impaired gait, Impaired muscle performance, and Decreased activity tolerance. These impairments interfere with their ability to perform self care tasks, recreational activities, household chores, driving , household mobility, and community mobility as compared to previous level of function.     Clinical Decision Making (Complexity):  Clinical Presentation: Stable/Uncomplicated  Clinical Presentation Rationale: based on medical and personal factors listed in PT evaluation  Clinical Decision Making (Complexity): Low complexity    PLAN OF CARE  Treatment Interventions:  Modalities: Cryotherapy, Hot Pack  Interventions: Gait Training, Manual Therapy, Neuromuscular Re-education, Therapeutic Activity, Therapeutic Exercise, Self-Care/Home Management, Aquatic Therapy    Long Term Goals     PT Goal 1  Goal Identifier: hip flexion  Goal Description: Pt will demonstrate hip flexion of at least 90  degrees to allow for sit to stand transfers without compensations.  Target Date: 12/12/23  PT Goal 2  Goal Identifier: bed mobility  Goal Description: Pt will demonstrate ability to transfer independently in and out of bed for improved lower extremity strength.  Target Date: 12/12/23  PT Goal 3  Goal Identifier: ambulation  Goal Description: Pt will demonstrate heel to toe gait pattern with least restrictive assistive device for distances of 600 feet or more.  Target Date: 01/09/24  PT Goal 4  Goal Identifier: HEP  Goal Description: Pt will report consistent performance of home exercises of at least 5/7 days per week for self management of symptoms.  Target Date: 01/23/24      Frequency of Treatment: 1-2 times per week  Duration of Treatment: up to 10 weeks    Recommended Referrals to Other Professionals:   Education Assessment:   Learner/Method: Patient;Significant Other    Risks and benefits of evaluation/treatment have been explained.   Patient/Family/caregiver agrees with Plan of Care.     Evaluation Time:     PT Eval, Low Complexity Minutes (51832): 30       Signing Clinician: Pat Clement PT      [facility]                                                                                   OUTPATIENT PHYSICAL THERAPY      PLAN OF TREATMENT FOR OUTPATIENT REHABILITATION   Patient's Last Name, First Name, Justino Farooq YOB: 1945   Provider's Name   [facility]   Medical Record No.  [id number]     Onset Date: 11/07/23  Start of Care Date: 11/14/23     Medical Diagnosis:  Arthritis of right hip (M16.11)      PT Treatment Diagnosis:  impaired mobility, flexibility and strength s/p MALENA Plan of Treatment  Frequency/Duration: 1-2 times per week/ up to 10 weeks    Certification date from 11/14/23 to 01/23/24         See note for plan of treatment details and functional goals     Pat Clement PT                         I CERTIFY  THE NEED FOR THESE SERVICES FURNISHED UNDER        THIS PLAN OF TREATMENT AND WHILE UNDER MY CARE     (Physician attestation of this document indicates review and certification of the therapy plan).              Referring Provider:  Terry Reich    Initial Assessment  See Epic Evaluation- Start of Care Date: 11/14/23

## 2023-11-16 DIAGNOSIS — M25.551 RIGHT HIP PAIN: ICD-10-CM

## 2023-11-16 RX ORDER — HYDROXYZINE HYDROCHLORIDE 10 MG/1
10 TABLET, FILM COATED ORAL EVERY 6 HOURS PRN
Qty: 30 TABLET | Refills: 0 | OUTPATIENT
Start: 2023-11-16

## 2023-11-16 RX ORDER — OXYCODONE HYDROCHLORIDE 5 MG/1
5 TABLET ORAL EVERY 4 HOURS PRN
Qty: 20 TABLET | Refills: 0 | OUTPATIENT
Start: 2023-11-16

## 2023-11-16 NOTE — TELEPHONE ENCOUNTER
Reason for call: Medication or medication refill    Name of medication requested:     oxyCODONE (ROXICODONE) 5 MG tablet       hydrOXYzine (ATARAX) 10 MG tablet       How many days of medication do you have left? 0    What pharmacy do you use? [facility]    Preferred method for responding to this message: Telephone Call    Phone number patient can be reached at: Home number on file [phone] (home)    If we cannot reach you directly, may we leave a detailed response at the number you provided? Yes    Thank you   Nila Lu on 11/16/2023 at 8:45 AM

## 2023-11-17 ENCOUNTER — THERAPY VISIT (OUTPATIENT)
Dept: PHYSICAL THERAPY | Facility: OTHER | Age: 78
End: 2023-11-17
Attending: ORTHOPAEDIC SURGERY
Payer: MEDICARE

## 2023-11-17 DIAGNOSIS — M16.11 ARTHRITIS OF RIGHT HIP: Primary | ICD-10-CM

## 2023-11-17 PROCEDURE — 97110 THERAPEUTIC EXERCISES: CPT | Mod: GP,CQ

## 2023-11-20 ENCOUNTER — OFFICE VISIT (OUTPATIENT)
Dept: ORTHOPEDICS | Facility: OTHER | Age: 78
End: 2023-11-20
Attending: ORTHOPAEDIC SURGERY
Payer: MEDICARE

## 2023-11-20 DIAGNOSIS — Z96.641 S/P TOTAL RIGHT HIP ARTHROPLASTY: Primary | ICD-10-CM

## 2023-11-20 PROCEDURE — G0463 HOSPITAL OUTPT CLINIC VISIT: HCPCS

## 2023-11-20 PROCEDURE — 99495 TRANSJ CARE MGMT MOD F2F 14D: CPT | Performed by: ORTHOPAEDIC SURGERY

## 2023-11-20 PROCEDURE — 99024 POSTOP FOLLOW-UP VISIT: CPT | Performed by: ORTHOPAEDIC SURGERY

## 2023-11-20 NOTE — PROGRESS NOTES
Subjective:    78-year-old gentleman now 13 days status post DA total hip arthroplasty done by Dr. Reich.  He is doing really well at this point no complaints.  He wants his handicap parking sticker filled out for today also wondering if he can sleep on the right side.    Objective:    On examination his wound is healed nicely.  His staples were removed today Steri-Strips were applied wound is benign    Assessment:    78-year-old gentleman 13 days status post DA right hip    Plan:    He is doing well at this point to follow-up with Dr. Reich in approximately 4 weeks

## 2023-11-20 NOTE — PROGRESS NOTES
Chief Complaint   Patient presents with    Surgical Followup     S/p 13 days right MALENA       Yuridia Alvarado LPN

## 2023-11-28 ENCOUNTER — THERAPY VISIT (OUTPATIENT)
Dept: PHYSICAL THERAPY | Facility: OTHER | Age: 78
End: 2023-11-28
Attending: ORTHOPAEDIC SURGERY
Payer: MEDICARE

## 2023-11-28 DIAGNOSIS — R29.898 DECREASED STRENGTH OF LOWER EXTREMITY: ICD-10-CM

## 2023-11-28 DIAGNOSIS — M16.11 ARTHRITIS OF RIGHT HIP: Primary | ICD-10-CM

## 2023-11-28 PROCEDURE — 97110 THERAPEUTIC EXERCISES: CPT | Mod: GP

## 2023-12-01 ENCOUNTER — THERAPY VISIT (OUTPATIENT)
Dept: PHYSICAL THERAPY | Facility: OTHER | Age: 78
End: 2023-12-01
Attending: ORTHOPAEDIC SURGERY
Payer: MEDICARE

## 2023-12-01 DIAGNOSIS — M16.11 ARTHRITIS OF RIGHT HIP: Primary | ICD-10-CM

## 2023-12-01 DIAGNOSIS — R29.898 DECREASED STRENGTH OF LOWER EXTREMITY: ICD-10-CM

## 2023-12-01 PROCEDURE — 97110 THERAPEUTIC EXERCISES: CPT | Mod: GP

## 2023-12-05 ENCOUNTER — THERAPY VISIT (OUTPATIENT)
Dept: PHYSICAL THERAPY | Facility: OTHER | Age: 78
End: 2023-12-05
Attending: ORTHOPAEDIC SURGERY
Payer: MEDICARE

## 2023-12-05 ENCOUNTER — TELEPHONE (OUTPATIENT)
Dept: ORTHOPEDICS | Facility: OTHER | Age: 78
End: 2023-12-05

## 2023-12-05 DIAGNOSIS — R29.898 DECREASED STRENGTH OF LOWER EXTREMITY: ICD-10-CM

## 2023-12-05 DIAGNOSIS — M25.561 RIGHT KNEE PAIN, UNSPECIFIED CHRONICITY: ICD-10-CM

## 2023-12-05 DIAGNOSIS — Z96.641 S/P TOTAL RIGHT HIP ARTHROPLASTY: Primary | ICD-10-CM

## 2023-12-05 DIAGNOSIS — M16.11 ARTHRITIS OF RIGHT HIP: Primary | ICD-10-CM

## 2023-12-05 PROCEDURE — 97110 THERAPEUTIC EXERCISES: CPT | Mod: GP

## 2023-12-05 NOTE — TELEPHONE ENCOUNTER
Patient called to talk to Dr. Reich. He just had hip surgery. He is having trouble with his right knee now. JRR ordered an MRI a couple of years ago, wondering if that could be revisited. Please call.    Glenna Ba on 12/5/2023 at 4:02 PM

## 2023-12-06 NOTE — TELEPHONE ENCOUNTER
Ok to get xrays of his right knee at next follow up. I talked to patient and he agrees.   Keesha Lara LPN .....................12/6/2023 3:13 PM

## 2023-12-08 ENCOUNTER — THERAPY VISIT (OUTPATIENT)
Dept: PHYSICAL THERAPY | Facility: OTHER | Age: 78
End: 2023-12-08
Attending: ORTHOPAEDIC SURGERY
Payer: MEDICARE

## 2023-12-08 DIAGNOSIS — R29.898 DECREASED STRENGTH OF LOWER EXTREMITY: ICD-10-CM

## 2023-12-08 DIAGNOSIS — M16.11 ARTHRITIS OF RIGHT HIP: Primary | ICD-10-CM

## 2023-12-08 PROCEDURE — 97110 THERAPEUTIC EXERCISES: CPT | Mod: GP

## 2023-12-12 ENCOUNTER — THERAPY VISIT (OUTPATIENT)
Dept: PHYSICAL THERAPY | Facility: OTHER | Age: 78
End: 2023-12-12
Attending: ORTHOPAEDIC SURGERY
Payer: MEDICARE

## 2023-12-12 DIAGNOSIS — R29.898 DECREASED STRENGTH OF LOWER EXTREMITY: ICD-10-CM

## 2023-12-12 DIAGNOSIS — M16.11 ARTHRITIS OF RIGHT HIP: Primary | ICD-10-CM

## 2023-12-12 PROCEDURE — 97110 THERAPEUTIC EXERCISES: CPT | Mod: GP

## 2023-12-14 ENCOUNTER — THERAPY VISIT (OUTPATIENT)
Dept: PHYSICAL THERAPY | Facility: OTHER | Age: 78
End: 2023-12-14
Attending: ORTHOPAEDIC SURGERY
Payer: MEDICARE

## 2023-12-14 DIAGNOSIS — M16.11 ARTHRITIS OF RIGHT HIP: Primary | ICD-10-CM

## 2023-12-14 DIAGNOSIS — R29.898 DECREASED STRENGTH OF LOWER EXTREMITY: ICD-10-CM

## 2023-12-14 PROCEDURE — 97110 THERAPEUTIC EXERCISES: CPT | Mod: GP

## 2023-12-20 ENCOUNTER — HOSPITAL ENCOUNTER (OUTPATIENT)
Dept: GENERAL RADIOLOGY | Facility: OTHER | Age: 78
Discharge: HOME | End: 2023-12-20
Attending: ORTHOPAEDIC SURGERY
Payer: MEDICARE

## 2023-12-20 ENCOUNTER — OFFICE VISIT (OUTPATIENT)
Dept: ORTHOPEDICS | Facility: OTHER | Age: 78
End: 2023-12-20
Attending: ORTHOPAEDIC SURGERY
Payer: MEDICARE

## 2023-12-20 DIAGNOSIS — Z96.641 S/P TOTAL RIGHT HIP ARTHROPLASTY: ICD-10-CM

## 2023-12-20 DIAGNOSIS — M25.561 RIGHT KNEE PAIN, UNSPECIFIED CHRONICITY: ICD-10-CM

## 2023-12-20 DIAGNOSIS — Z96.641 S/P TOTAL RIGHT HIP ARTHROPLASTY: Primary | ICD-10-CM

## 2023-12-20 PROCEDURE — 73560 X-RAY EXAM OF KNEE 1 OR 2: CPT | Mod: LT

## 2023-12-20 PROCEDURE — 73502 X-RAY EXAM HIP UNI 2-3 VIEWS: CPT

## 2023-12-20 PROCEDURE — 99212 OFFICE O/P EST SF 10 MIN: CPT | Mod: 24 | Performed by: ORTHOPAEDIC SURGERY

## 2023-12-20 PROCEDURE — G0463 HOSPITAL OUTPT CLINIC VISIT: HCPCS

## 2023-12-20 PROCEDURE — 99024 POSTOP FOLLOW-UP VISIT: CPT | Performed by: ORTHOPAEDIC SURGERY

## 2023-12-20 NOTE — PROGRESS NOTES
SUBJECTIVE:  Patient returns status post right total hip replacement.  Patient overall is come along in regards to that.  Patient does report some knee pain as well along the medial aspect.  Patient wanted to have things checked out and evaluated there as well.    ROS: Musculoskeletal and general review of systems are negative, per review of previous clinic questionnaire.  Denies SOB and calf pain.    EXAM: Right hip examination shows range of motion is tolerable.  Strength is improving.  Neuroexam is intact.  Right knee examination shows tenderness across medial joint line.  Mild varus deformity noted.  No pain with Stacey testing.    IMAGIN views right hip show patient's components to be doing well.  3 views right knee show mild medial compartment arthritis as well.    ASSESSMENT: Right total hip replacement.  #2 mild right knee arthrosis    PLAN: Continue to give this time.  Symptoms certainly will improve with time.  Recheck examination at 1 year.  If knee continues to be a source of issue I would advise cortisone based injection.    Terry Reich MD

## 2024-01-12 ASSESSMENT — ENCOUNTER SYMPTOMS
HEMATURIA: 0
JOINT SWELLING: 1
FEVER: 0
ABDOMINAL PAIN: 0
COUGH: 1
HEADACHES: 0
HEMATOCHEZIA: 0
DIZZINESS: 0
ARTHRALGIAS: 1
DYSURIA: 0
PARESTHESIAS: 0
NAUSEA: 0
NERVOUS/ANXIOUS: 0
MYALGIAS: 1
SORE THROAT: 0
HEARTBURN: 0
CHILLS: 0
EYE PAIN: 0
CONSTIPATION: 1
WEAKNESS: 0
PALPITATIONS: 0
FREQUENCY: 1
DIARRHEA: 0
SHORTNESS OF BREATH: 0

## 2024-01-12 ASSESSMENT — ACTIVITIES OF DAILY LIVING (ADL): CURRENT_FUNCTION: NO ASSISTANCE NEEDED

## 2024-01-18 ENCOUNTER — OFFICE VISIT (OUTPATIENT)
Dept: INTERNAL MEDICINE | Facility: OTHER | Age: 79
End: 2024-01-18
Attending: STUDENT IN AN ORGANIZED HEALTH CARE EDUCATION/TRAINING PROGRAM
Payer: MEDICARE

## 2024-01-18 VITALS
TEMPERATURE: 96.4 F | BODY MASS INDEX: 34.59 KG/M2 | DIASTOLIC BLOOD PRESSURE: 70 MMHG | RESPIRATION RATE: 18 BRPM | WEIGHT: 228.2 LBS | OXYGEN SATURATION: 98 % | HEART RATE: 62 BPM | SYSTOLIC BLOOD PRESSURE: 130 MMHG | HEIGHT: 68 IN

## 2024-01-18 DIAGNOSIS — E89.0 HYPOTHYROIDISM FOLLOWING RADIOIODINE THERAPY: ICD-10-CM

## 2024-01-18 DIAGNOSIS — Z00.00 MEDICARE ANNUAL WELLNESS VISIT, SUBSEQUENT: Primary | ICD-10-CM

## 2024-01-18 DIAGNOSIS — M25.551 RIGHT HIP PAIN: ICD-10-CM

## 2024-01-18 DIAGNOSIS — R35.0 BENIGN PROSTATIC HYPERPLASIA WITH URINARY FREQUENCY: ICD-10-CM

## 2024-01-18 DIAGNOSIS — N40.1 BENIGN PROSTATIC HYPERPLASIA WITH URINARY FREQUENCY: ICD-10-CM

## 2024-01-18 DIAGNOSIS — C43.59 MALIGNANT MELANOMA OF TORSO EXCLUDING BREAST (H): ICD-10-CM

## 2024-01-18 DIAGNOSIS — R93.1 ELEVATED CORONARY ARTERY CALCIUM SCORE: ICD-10-CM

## 2024-01-18 LAB
ALBUMIN SERPL BCG-MCNC: 4.3 G/DL (ref 3.5–5.2)
ALP SERPL-CCNC: 123 U/L (ref 40–150)
ALT SERPL W P-5'-P-CCNC: 13 U/L (ref 0–70)
ANION GAP SERPL CALCULATED.3IONS-SCNC: 8 MMOL/L (ref 7–15)
AST SERPL W P-5'-P-CCNC: 19 U/L (ref 0–45)
BILIRUB SERPL-MCNC: 0.6 MG/DL
BUN SERPL-MCNC: 13.6 MG/DL (ref 8–23)
CALCIUM SERPL-MCNC: 9.5 MG/DL (ref 8.8–10.2)
CHLORIDE SERPL-SCNC: 101 MMOL/L (ref 98–107)
CHOLEST SERPL-MCNC: 143 MG/DL
CREAT SERPL-MCNC: 0.91 MG/DL (ref 0.67–1.17)
DEPRECATED HCO3 PLAS-SCNC: 27 MMOL/L (ref 22–29)
EGFRCR SERPLBLD CKD-EPI 2021: 85 ML/MIN/1.73M2
ERYTHROCYTE [DISTWIDTH] IN BLOOD BY AUTOMATED COUNT: 12.4 % (ref 10–15)
FASTING STATUS PATIENT QL REPORTED: NO
GLUCOSE SERPL-MCNC: 102 MG/DL (ref 70–99)
HCT VFR BLD AUTO: 39.8 % (ref 40–53)
HDLC SERPL-MCNC: 63 MG/DL
HGB BLD-MCNC: 13.5 G/DL (ref 13.3–17.7)
LDLC SERPL CALC-MCNC: 65 MG/DL
MCH RBC QN AUTO: 33 PG (ref 26.5–33)
MCHC RBC AUTO-ENTMCNC: 33.9 G/DL (ref 31.5–36.5)
MCV RBC AUTO: 97 FL (ref 78–100)
NONHDLC SERPL-MCNC: 80 MG/DL
PLATELET # BLD AUTO: 287 10E3/UL (ref 150–450)
POTASSIUM SERPL-SCNC: 4.4 MMOL/L (ref 3.4–5.3)
PROT SERPL-MCNC: 7.6 G/DL (ref 6.4–8.3)
RBC # BLD AUTO: 4.09 10E6/UL (ref 4.4–5.9)
SODIUM SERPL-SCNC: 136 MMOL/L (ref 135–145)
TRIGL SERPL-MCNC: 74 MG/DL
TSH SERPL DL<=0.005 MIU/L-ACNC: 1.57 UIU/ML (ref 0.3–4.2)
WBC # BLD AUTO: 4.6 10E3/UL (ref 4–11)

## 2024-01-18 PROCEDURE — G0439 PPPS, SUBSEQ VISIT: HCPCS | Performed by: STUDENT IN AN ORGANIZED HEALTH CARE EDUCATION/TRAINING PROGRAM

## 2024-01-18 PROCEDURE — 80053 COMPREHEN METABOLIC PANEL: CPT | Mod: ZL | Performed by: STUDENT IN AN ORGANIZED HEALTH CARE EDUCATION/TRAINING PROGRAM

## 2024-01-18 PROCEDURE — 36415 COLL VENOUS BLD VENIPUNCTURE: CPT | Mod: ZL | Performed by: STUDENT IN AN ORGANIZED HEALTH CARE EDUCATION/TRAINING PROGRAM

## 2024-01-18 PROCEDURE — 85027 COMPLETE CBC AUTOMATED: CPT | Mod: ZL | Performed by: STUDENT IN AN ORGANIZED HEALTH CARE EDUCATION/TRAINING PROGRAM

## 2024-01-18 PROCEDURE — 84443 ASSAY THYROID STIM HORMONE: CPT | Mod: ZL | Performed by: STUDENT IN AN ORGANIZED HEALTH CARE EDUCATION/TRAINING PROGRAM

## 2024-01-18 PROCEDURE — 80061 LIPID PANEL: CPT | Mod: ZL | Performed by: STUDENT IN AN ORGANIZED HEALTH CARE EDUCATION/TRAINING PROGRAM

## 2024-01-18 RX ORDER — ATORVASTATIN CALCIUM 80 MG/1
80 TABLET, FILM COATED ORAL
Qty: 90 TABLET | Refills: 4 | Status: SHIPPED | OUTPATIENT
Start: 2024-01-18

## 2024-01-18 RX ORDER — ACETAMINOPHEN 500 MG
500 TABLET ORAL EVERY 4 HOURS PRN
COMMUNITY
Start: 2024-01-18

## 2024-01-18 RX ORDER — TAMSULOSIN HYDROCHLORIDE 0.4 MG/1
0.4 CAPSULE ORAL DAILY
Qty: 90 CAPSULE | Refills: 4 | Status: SHIPPED | OUTPATIENT
Start: 2024-01-18

## 2024-01-18 RX ORDER — LEVOTHYROXINE SODIUM 100 UG/1
100 TABLET ORAL
Qty: 90 TABLET | Refills: 4 | Status: SHIPPED | OUTPATIENT
Start: 2024-01-18

## 2024-01-18 ASSESSMENT — PAIN SCALES - GENERAL: PAINLEVEL: NO PAIN (0)

## 2024-01-18 ASSESSMENT — ACTIVITIES OF DAILY LIVING (ADL): CURRENT_FUNCTION: NO ASSISTANCE NEEDED

## 2024-01-18 NOTE — LETTER
January 19, 2024      Alvaro Wang  [address] [place] [id number]        Dear ,    We are writing to inform you of your test results.    Your laboratory results are essentially normal.  Continue medications without change.  I will see you in 1 year for repeat Medicare wellness exam.    Resulted Orders   TSH Reflex GH   Result Value Ref Range    TSH 1.57 0.30 - 4.20 uIU/mL   Comprehensive Metabolic Panel   Result Value Ref Range    Sodium 136 135 - 145 mmol/L      Comment:      Reference intervals for this test were updated on 09/26/2023 to more accurately reflect our healthy population. There may be differences in the flagging of prior results with similar values performed with this method. Interpretation of those prior results can be made in the context of the updated reference intervals.     Potassium 4.4 3.4 - 5.3 mmol/L    Carbon Dioxide (CO2) 27 22 - 29 mmol/L    Anion Gap 8 7 - 15 mmol/L    Urea Nitrogen 13.6 8.0 - 23.0 mg/dL    Creatinine 0.91 0.67 - 1.17 mg/dL    GFR Estimate 85 >60 mL/min/1.73m2    Calcium 9.5 8.8 - 10.2 mg/dL    Chloride 101 98 - 107 mmol/L    Glucose 102 (H) 70 - 99 mg/dL    Alkaline Phosphatase 123 40 - 150 U/L      Comment:      Reference intervals for this test were updated on 11/14/2023 to more accurately reflect our healthy population. There may be differences in the flagging of prior results with similar values performed with this method. Interpretation of those prior results can be made in the context of the updated reference intervals.    AST 19 0 - 45 U/L      Comment:      Reference intervals for this test were updated on 6/12/2023 to more accurately reflect our healthy population. There may be differences in the flagging of prior results with similar values performed with this method. Interpretation of those prior results can be made in the context of the updated reference intervals.    ALT 13 0 - 70 U/L      Comment:      Reference  intervals for this test were updated on 6/12/2023 to more accurately reflect our healthy population. There may be differences in the flagging of prior results with similar values performed with this method. Interpretation of those prior results can be made in the context of the updated reference intervals.      Protein Total 7.6 6.4 - 8.3 g/dL    Albumin 4.3 3.5 - 5.2 g/dL    Bilirubin Total 0.6 <=1.2 mg/dL   Lipid Panel   Result Value Ref Range    Cholesterol 143 <200 mg/dL    Triglycerides 74 <150 mg/dL    Direct Measure HDL 63 >=40 mg/dL    LDL Cholesterol Calculated 65 <=100 mg/dL    Non HDL Cholesterol 80 <130 mg/dL    Patient Fasting > 8hrs? No     Narrative    Cholesterol  Desirable:  <200 mg/dL    Triglycerides  Normal:  Less than 150 mg/dL  Borderline High:  150-199 mg/dL  High:  200-499 mg/dL  Very High:  Greater than or equal to 500 mg/dL    Direct Measure HDL  Female:  Greater than or equal to 50 mg/dL   Male:  Greater than or equal to 40 mg/dL    LDL Cholesterol  Desirable:  <100mg/dL  Above Desirable:  100-129 mg/dL   Borderline High:  130-159 mg/dL   High:  160-189 mg/dL   Very High:  >= 190 mg/dL    Non HDL Cholesterol  Desirable:  130 mg/dL  Above Desirable:  130-159 mg/dL  Borderline High:  160-189 mg/dL  High:  190-219 mg/dL  Very High:  Greater than or equal to 220 mg/dL   CBC W PLT No Diff   Result Value Ref Range    WBC Count 4.6 4.0 - 11.0 10e3/uL    RBC Count 4.09 (L) 4.40 - 5.90 10e6/uL    Hemoglobin 13.5 13.3 - 17.7 g/dL    Hematocrit 39.8 (L) 40.0 - 53.0 %    MCV 97 78 - 100 fL    MCH 33.0 26.5 - 33.0 pg    MCHC 33.9 31.5 - 36.5 g/dL    RDW 12.4 10.0 - 15.0 %    Platelet Count 287 150 - 450 10e3/uL       If you have any questions or concerns, please call the clinic at the number listed above.       Sincerely,      Too Wild MD

## 2024-01-18 NOTE — NURSING NOTE
"Chief Complaint   Patient presents with    Medicare Visit     Annual & Est. Care       Initial /70   Pulse 62   Temp (!) 96.4  F (35.8  C) (Temporal)   Resp 18   Ht 1.727 m (5' 8\")   Wt 103.5 kg (228 lb 3.2 oz)   SpO2 98%   BMI 34.70 kg/m   Estimated body mass index is 34.7 kg/m  as calculated from the following:    Height as of this encounter: 1.727 m (5' 8\").    Weight as of this encounter: 103.5 kg (228 lb 3.2 oz).  Medication Review: complete    The next two questions are to help us understand your food security.  If you are feeling you need any assistance in this area, we have resources available to support you today.          1/12/2024   SDOH- Food Insecurity   Within the past 12 months, did you worry that your food would run out before you got money to buy more? N   Within the past 12 months, did the food you bought just not last and you didn t have money to get more? N         Health Care Directive:  Patient has a Health Care Directive on file      Muna Lee LPN      "

## 2024-01-18 NOTE — PROGRESS NOTES
"Preventive Care Visit  [facility]  Too Wild MD, Internal Medicine  Jan 18, 2024      SUBJECTIVE:   Alvaro is a 78 year old, presenting for the following:  Medicare Visit (Annual & Est. Care)        1/18/2024    10:13 AM   Additional Questions   Roomed by Charles Lee LPN     Are you in the first 12 months of your Medicare coverage?  No    Healthy Habits:     In general, how would you rate your overall health?  Excellent    Frequency of exercise:  6-7 days/week    Duration of exercise:  15-30 minutes    Do you usually eat at least 4 servings of fruit and vegetables a day, include whole grains    & fiber and avoid regularly eating high fat or \"junk\" foods?  Yes    Taking medications regularly:  Yes    Medication side effects:  None    Ability to successfully perform activities of daily living:  No assistance needed    Home Safety:  No safety concerns identified    Hearing Impairment:  Difficulty following a conversation in a noisy restaurant or crowded room, feel that people are mumbling or not speaking clearly and need to ask people to speak up or repeat themselves    In the past 6 months, have you been bothered by leaking of urine? Yes    In general, how would you rate your overall mental or emotional health?  Excellent    Additional concerns today:  No          Have you ever done Advance Care Planning? (For example, a Health Directive, POLST, or a discussion with a medical provider or your loved ones about your wishes): Yes, advance care planning is on file.       Fall risk  Fallen 2 or more times in the past year?: No  Any fall with injury in the past year?: No    Cognitive Screening   1) Repeat 3 items (Leader, Season, Table)    2) Clock draw: ABNORMAL Numbers are backwards   3) 3 item recall: Recalls 3 objects  Results: 3 items recalled: COGNITIVE IMPAIRMENT LESS LIKELY    Mini-CogTM Copyright S Janny. Licensed by the author for use in [facility]; reprinted with " permission ([email or web address]). All rights reserved.        Wants me to lookin in his left ear.     Has been having to swallow all the time.   Swallowing a lot.   Recommended taking claratin. Dried out his mouth so bad.    Flomax started with hip surgery.   Pleased with results.   Decreased urgency. About the same frequency as well.   Hip surgery November 7th.     No new skin concerns with his history of melanoma.   Sees dermatology every 3 months for follow up.       Discussed his calcium score.  We also discussed that he is on high intensity statin and aspirin.  He is wondering since this is such a high score if it should be repeated.  We discussed that this likely is not an error and if we were to repeat this and his score is worse we would not change anything.  He feels slightly better would likely continue him on statin and aspirin if his score is worse it may just provide increased stress/unwanted morning.    Some twitching in his chest at times. Not related to his heart. Feels shallow.       Weight stable 219-221 for 5+ years.       Do you have sleep apnea, excessive snoring or daytime drowsiness? : no    Reviewed and updated as needed this visit by clinical staff   Tobacco  Allergies  Meds              Reviewed and updated as needed this visit by Provider                  Social History     Tobacco Use    Smoking status: Never    Smokeless tobacco: Never   Substance Use Topics    Alcohol use: Yes     Alcohol/week: 12.5 standard drinks of alcohol     Comment: 1 shot and 1 glass of wine daily.             1/12/2024    11:10 AM   Alcohol Use   Prescreen: >3 drinks/day or >7 drinks/week? No     Do you have a current opioid prescription? No  Do you use any other controlled substances or medications that are not prescribed by a provider? None      Current providers sharing in care for this patient include:   Patient Care Team:  Too Wild MD as PCP - Fermín Orellana MD as Assigned PCP  Dian  David ESQUIVEL MD as Assigned Surgical Provider  Kelly Oliveira MD as Assigned Cancer Care Provider  Lindsay Humphrey, CRISTIAN CNP as Nurse Practitioner (Nurse Practitioner Primary Care)  Terry Reich MD as MD (Orthopaedic Surgery)  Aris Ann MD as MD (Orthopaedic Surgery)  Terry Reich MD as Assigned Musculoskeletal Provider    The following health maintenance items are reviewed in Epic and correct as of today:  Health Maintenance   Topic Date Due    RSV VACCINE (Pregnancy & 60+) (1 - 1-dose 60+ series) Never done    ZOSTER IMMUNIZATION (2 of 3) 12/19/2012    DTAP/TDAP/TD IMMUNIZATION (4 - Td or Tdap) 10/24/2022    PHQ-2 (once per calendar year)  01/01/2024    MEDICARE ANNUAL WELLNESS VISIT  01/09/2024    TSH W/FREE T4 REFLEX  10/23/2024    FALL RISK ASSESSMENT  01/18/2025    COLORECTAL CANCER SCREENING  05/06/2026    ADVANCE CARE PLANNING  01/27/2028    LIPID  10/23/2028    INFLUENZA VACCINE  Completed    Pneumococcal Vaccine: 65+ Years  Completed    COVID-19 Vaccine  Completed    IPV IMMUNIZATION  Aged Out    HPV IMMUNIZATION  Aged Out    MENINGITIS IMMUNIZATION  Aged Out    RSV MONOCLONAL ANTIBODY  Aged Out     Lab work is in process    Review of Systems   Constitutional:  Negative for chills and fever.   HENT:  Positive for congestion and hearing loss. Negative for ear pain and sore throat.    Eyes:  Negative for pain and visual disturbance.   Respiratory:  Positive for cough. Negative for shortness of breath.    Cardiovascular:  Negative for chest pain and palpitations.   Gastrointestinal:  Positive for constipation. Negative for abdominal pain, diarrhea and nausea.   Genitourinary:  Positive for frequency and urgency. Negative for dysuria, genital sores, hematuria and penile discharge.   Musculoskeletal:  Positive for arthralgias, joint swelling and myalgias.   Skin:  Negative for rash.   Neurological:  Negative for dizziness, weakness and headaches.   Psychiatric/Behavioral:  The patient is not  "nervous/anxious.         OBJECTIVE:   /70   Pulse 62   Temp (!) 96.4  F (35.8  C) (Temporal)   Resp 18   Ht 1.727 m (5' 8\")   Wt 103.5 kg (228 lb 3.2 oz)   SpO2 98%   BMI 34.70 kg/m     Estimated body mass index is 34.7 kg/m  as calculated from the following:    Height as of this encounter: 1.727 m (5' 8\").    Weight as of this encounter: 103.5 kg (228 lb 3.2 oz).    Physical Exam  Vitals and nursing note reviewed.   Constitutional:       General: He is not in acute distress.     Appearance: He is well-developed. He is not diaphoretic.   HENT:      Head: Normocephalic and atraumatic.      Right Ear: Tympanic membrane, ear canal and external ear normal.      Left Ear: Tympanic membrane, ear canal and external ear normal.      Mouth/Throat:      Mouth: Mucous membranes are moist.      Pharynx: Oropharynx is clear.   Eyes:      General: No scleral icterus.     Conjunctiva/sclera: Conjunctivae normal.   Cardiovascular:      Rate and Rhythm: Normal rate and regular rhythm.      Heart sounds: No murmur heard.  Pulmonary:      Effort: Pulmonary effort is normal.      Breath sounds: Normal breath sounds. No wheezing.   Abdominal:      Palpations: Abdomen is soft. There is no mass.      Tenderness: There is no abdominal tenderness.   Musculoskeletal:         General: No deformity.      Cervical back: Neck supple.   Lymphadenopathy:      Cervical: No cervical adenopathy.   Skin:     General: Skin is warm and dry.      Coloration: Skin is not jaundiced.      Findings: No rash.   Neurological:      Mental Status: He is alert and oriented to person, place, and time. Mental status is at baseline.   Psychiatric:         Mood and Affect: Mood normal.         Behavior: Behavior normal.         Thought Content: Thought content normal.         Diagnostic Test Results:  Labs reviewed in Epic      Current Outpatient Medications   Medication    acetaminophen (TYLENOL) 500 MG tablet    atorvastatin (LIPITOR) 80 MG tablet    " levothyroxine (SYNTHROID/LEVOTHROID) 100 MCG tablet    tamsulosin (FLOMAX) 0.4 MG capsule    aspirin EC 81 MG EC tablet     No current facility-administered medications for this visit.         ASSESSMENT / PLAN:   Assessment & Plan         ICD-10-CM    1. Medicare annual wellness visit, subsequent  Z00.00 Comprehensive Metabolic Panel     CBC W PLT No Diff     Comprehensive Metabolic Panel     CBC W PLT No Diff      2. Malignant melanoma of torso excluding breast (H)  C43.59       3. Right hip pain  M25.551 acetaminophen (TYLENOL) 500 MG tablet      4. Elevated coronary artery calcium score  R93.1 atorvastatin (LIPITOR) 80 MG tablet     Lipid Panel     Lipid Panel      5. Hypothyroidism following radioiodine therapy  E89.0 TSH Reflex [facility]     levothyroxine (SYNTHROID/LEVOTHROID) 100 MCG tablet     TSH Reflex [facility]      6. Benign prostatic hyperplasia with urinary frequency  N40.1 tamsulosin (FLOMAX) 0.4 MG capsule    R35.0         Medicare wellness exam: Updated past medical history surgical history social history and medications.  Patient has graduated from colon cancer screening, PSA, and blood pressure is under reasonable control today.  Screening labs ordered today and essentially unremarkable.    History of melanoma: Following with dermatology 3 times yearly.  No atypical lesions appreciated on visible skin today.    Hyperlipidemia, elevated coronary calcium score: Continue aspirin and atorvastatin.  Recheck lipids today.    BPH: Refill tamsulosin.  Tolerating well, stable.      Hypothyroidism recheck TSH today is normal.  Continue thyroxine change.      No follow-ups on file.    Too Wild MD  [facility]      Patient has been advised of split billing requirements and indicates understanding: Yes      Counseling  Reviewed preventive health counseling, as reflected in patient instructions      BMI  Estimated body mass index is 34.7 kg/m  as calculated from the following:    Height as of this  "encounter: 1.727 m (5' 8\").    Weight as of this encounter: 103.5 kg (228 lb 3.2 oz).   Weight management plan: Discussed healthy diet and exercise guidelines      He reports that he has never smoked. He has never used smokeless tobacco.      Appropriate preventive services were discussed with this patient, including applicable screening as appropriate for fall prevention, nutrition, physical activity, Tobacco-use cessation, weight loss and cognition.  Checklist reviewing preventive services available has been given to the patient.        Signed Electronically by: Too Wild MD    Identified Health Risks  I have reviewed Opioid Use Disorder and Substance Use Disorder risk factors and made any needed referrals.   Answers submitted by the patient for this visit:  Annual Preventive Visit (Submitted on 1/12/2024)  Chief Complaint: Annual Exam:  Blood in stool: No  heartburn: No  peripheral edema: No  mood changes: No  Skin sensation changes: No  impotence: Yes    "

## 2024-01-25 ENCOUNTER — ALLIED HEALTH/NURSE VISIT (OUTPATIENT)
Dept: FAMILY MEDICINE | Facility: OTHER | Age: 79
End: 2024-01-25
Attending: STUDENT IN AN ORGANIZED HEALTH CARE EDUCATION/TRAINING PROGRAM
Payer: MEDICARE

## 2024-01-25 DIAGNOSIS — Z23 NEED FOR VACCINATION: Primary | ICD-10-CM

## 2024-01-25 PROCEDURE — G0009 ADMIN PNEUMOCOCCAL VACCINE: HCPCS

## 2024-01-25 NOTE — PROGRESS NOTES
Immunization Documentation  Verified patient's first and last name, and . Stated reason for visit today is to receive pneumococcal (Prevnar 20) vaccine. Denied any concerns with previous immunizations. Allergies reviewed. Vaccine prepared and administered per standing order. Administration documented in IMMUNIZATIONS (see flowsheet and order for further information).    TEMO SPRINGER RN on 2024 at 2:05 PM

## 2024-05-08 NOTE — NURSING NOTE
Pt presents to clinic today for annual physical and medication management.  Shantel Redman    
None known

## 2024-06-14 ENCOUNTER — OFFICE VISIT (OUTPATIENT)
Dept: FAMILY MEDICINE | Facility: OTHER | Age: 79
End: 2024-06-14
Payer: MEDICARE

## 2024-06-14 VITALS
WEIGHT: 227.4 LBS | OXYGEN SATURATION: 96 % | SYSTOLIC BLOOD PRESSURE: 126 MMHG | TEMPERATURE: 98.2 F | RESPIRATION RATE: 19 BRPM | DIASTOLIC BLOOD PRESSURE: 74 MMHG | BODY MASS INDEX: 34.46 KG/M2 | HEART RATE: 56 BPM | HEIGHT: 68 IN

## 2024-06-14 DIAGNOSIS — S20.362A TICK BITE OF LEFT SIDE OF CHEST WALL, INITIAL ENCOUNTER: Primary | ICD-10-CM
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
## 2024-06-14 PROCEDURE — 99213 OFFICE O/P EST LOW 20 MIN: CPT

## 2024-06-14 PROCEDURE — G0463 HOSPITAL OUTPT CLINIC VISIT: HCPCS

## 2024-06-14 RX ORDER — DOXYCYCLINE 100 MG/1
200 CAPSULE ORAL ONCE
Qty: 2 CAPSULE | Refills: 0 | Status: SHIPPED | OUTPATIENT
Start: 2024-06-14 | End: 2024-06-14

## 2024-06-14 ASSESSMENT — PAIN SCALES - GENERAL: PAINLEVEL: MILD PAIN (2)

## 2024-06-14 NOTE — PATIENT INSTRUCTIONS
Please refer to your AVS for follow up and pain/symptoms management recommendations (I.e.: medications, helpful conservative treatment modalities, appropriate follow up if need to a specialist or family practice, etc.). Please return to either your primary care provider rapid/urgent care clinic if your symptoms change or worsen.     Discharge instructions:  -If you were prescribed a medication(s), please take this as prescribed/directed  -Monitor your symptoms, if changing/worsening, return to UC/ER or PCP for follow up    Tick Bite   -For some, labs are also drawn to check for Lyme disease (and/or other tick borne illness) - if you had labs drawn, please note that these labs can take 3-7 days to return, a member of the team should reach out to you with your results, if you do not receive these, please reach out.   -Please take the course of antibiotics until completion (if prescribed).    -For prevention of further bites, we recommend long shirts/pants while outdoors, tick repellant with > 20% DEET, take a shower or bathe within 2 hours of being outdoors, check yourself for ticks (look in unusual locations such as hair, behind ears/knees, etc.).   -For pain control (if needed), if you are able to take Ibuprofen and Tylenol, we recommend alternating these (see note below). Do not wear a patch over your eye (unless directed to do so).    -Alternate every 4 hours as needed. I.e.: Ibuprofen at 8am, Tylenol 12pm, Ibuprofen 4pm    -Daily maximum of Tylenol is 4000mg (recommend staying under 3000mg)   -Daily maximum of Ibuprofen is 3200mg (take no more than six 200mg pills a day)    Recommend to hold daily multivitamin  Doxycycline as this can make the medication less effective.  We also recommend that you watch your sunlight exposure as you will burn quicker than usual.    Additional Information:  Any remaining embedded matter can be left and will clear naturally.   Monitor the area where ticks were removed as they can  become infected also. May use antibacterial ointment.    -Monitor for the below symptoms for 30 days and call your healthcare provider if you get any of the following:   ? Fatigue    ? Headache    ? Neck stiffness    ? Myalgias/muscle soreness   ? Arthralgias/joint pain   ? Regional lymphadenopathy/swollen lymph nodes   ? Fever    * Keep vigilant with tick checks.    Risk of Lyme disease is very low with the tick has been attached for fewer than 36 hours.    Common locations to check for ticks in the future include entering around her ears, and and around the hair, inside the bellybutton, under your arms, around her waist, between your legs and the back sides of your knees.    Approach to prophylaxis -- per up to date guidelines, they agree with the Infectious Diseases Society of [place] (IDSA) guidelines that recommend antibiotic prophylaxis only in patients who meet all of the following criteria:  ?Attached tick identified as an adult or nymphal I. scapularis tick (deer tick).  ?Tick is estimated to have been attached for ?36 hours (by degree of engorgement or time of exposure).  ?Prophylaxis is begun within 72 hours of tick removal.  ?Local rate of infection of ticks with B. burgdorferi is ?20 percent (these rates of infection have been shown to occur in parts of [place], parts of the [place], and parts of [place] and [place]).

## 2024-06-14 NOTE — NURSING NOTE
"Chief Complaint   Patient presents with    Tick Bite     Left collar bone, deer tick     Patient here for a deer tick bite of left collar bone x2 days ago.    Initial /74   Pulse 56   Temp 98.2  F (36.8  C) (Tympanic)   Resp 19   Ht 1.727 m (5' 8\")   Wt 103.1 kg (227 lb 6.4 oz)   SpO2 96%   BMI 34.58 kg/m   Estimated body mass index is 34.58 kg/m  as calculated from the following:    Height as of this encounter: 1.727 m (5' 8\").    Weight as of this encounter: 103.1 kg (227 lb 6.4 oz).  Medication Review: complete    The next two questions are to help us understand your food security.  If you are feeling you need any assistance in this area, we have resources available to support you today.          6/14/2024   SDOH- Food Insecurity   Within the past 12 months, did you worry that your food would run out before you got money to buy more? N   Within the past 12 months, did the food you bought just not last and you didn t have money to get more? N         Health Care Directive:  Patient has a Health Care Directive on file      Lilly Islas LPN      "

## 2024-06-14 NOTE — PROGRESS NOTES
ASSESSMENT/PLAN:    I have reviewed the nursing notes.  I have reviewed the findings, diagnosis, plan and need for follow up with the patient.    1. Tick bite of left side of chest wall, initial encounter  - doxycycline hyclate (VIBRAMYCIN) 100 MG capsule; Take 2 capsules (200 mg) by mouth once for 1 dose  Dispense: 2 capsule; Refill: 0    Patient presents with a tick bite of his left clavicle area.  There were some remnants of the tick remaining in his skin and this was removed using a blunt needle.  No signs of infection noted at this time.  Advised patient to keep the area clean and dry may apply antibiotic ointment as needed.  Due to this being a deer tick bite will treat patient with prophylactic doxycycline.  Advised patient not to eat or drink any dairy within an hour or 2 of taking this medication.    Discussed warning signs/symptoms indicative of need to f/u    Follow up if symptoms persist or worsen or concerns    I explained my diagnostic considerations and recommendations to the patient, who voiced understanding and agreement with the treatment plan. All questions were answered. We discussed potential side effects of any prescribed or recommended therapies, as well as expectations for response to treatments.    Maksim Soto, CRISTIAN CNP  6/14/2024  10:17 AM    HPI:    Justino Wang is a 79 year old male  who [facility] for concerns of tick bite    Patient states he removed a deer tick from his left clavicle area yesterday.  He states it was attached for maybe 24 hours, possibly more.  He remove the tick but believes there may be some of the tick remaining in his skin.  He cleaned the area with alcohol.  He denies any signs of infection or flulike symptoms.    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     I131 ablation 2002    Tubular adenoma of colon 1/11/2016    Zoster without complications     1982,right facial     Past Surgical History:   Procedure Laterality Date    ARTHROPLASTY HIP ANTERIOR Right 11/7/2023    Procedure: ARTHROPLASTY, HIP, TOTAL, DIRECT ANTERIOR APPROACH;  Surgeon: Terry Reich MD;  Location: [facility] OR    C THYROID ABLATION      CLOSED RX ELBOW DISLOCATION  07/2018    Dr Reich    COLONOSCOPY  11/18/2016    Dr Rosa    COLONOSCOPY  05/06/2021    normal    EXCISE LESION (LOCATION) Left 2/22/2023    Procedure: WIDE LOCAL EXCISION OF BACK LESION, FOR MELANOMA , BIOPSY, LYMPH NODE, SENTINEL;  Surgeon: David Biggs MD;  Location: [facility] OR    VASECTOMY      No Comments Provided     Social History     Tobacco Use    Smoking status: Never    Smokeless tobacco: Never   Substance Use Topics    Alcohol use: Yes     Alcohol/week: 12.5 standard drinks of alcohol     Comment: 1 shot and 1 glass of wine daily.     Current Outpatient Medications   Medication Sig Dispense Refill    acetaminophen (TYLENOL) 500 MG tablet Take 1 tablet (500 mg) by mouth every 4 hours as needed for other (mild pain)      aspirin EC 81 MG EC tablet Take 81 mg by mouth daily with food Take 1 tablet by mouth once daily with a meal.      atorvastatin (LIPITOR) 80 MG tablet Take 1 tablet (80 mg) by mouth daily (with dinner) 90 tablet 4    levothyroxine (SYNTHROID/LEVOTHROID) 100 MCG tablet Take 1 tablet (100 mcg) by mouth every morning (before breakfast) Take 1 tablet by mouth before breakfast. 90 tablet 4    tamsulosin (FLOMAX) 0.4 MG capsule Take 1 capsule (0.4 mg) by mouth daily 90 capsule 4     No Known Allergies  Past medical history, past surgical history, current medications and allergies reviewed and accurate to the best of my knowledge.      ROS:  Refer to HPI    /74   Pulse 56   Temp 98.2  F (36.8  C) (Tympanic)   Resp  "19   Ht 1.727 m (5' 8\")   Wt 103.1 kg (227 lb 6.4 oz)   SpO2 96%   BMI 34.58 kg/m      EXAM:  General Appearance: Well appearing 79 year old male, appropriate appearance for age. No acute distress   Dermatological: Deer tick bite of left clavicle area with very mild surrounding erythema, small part of tick remaining skin, no signs of infection noted  Neuro: Alert and oriented to person, place, and time.    Psychological: normal affect, alert, oriented, and pleasant.       "

## 2024-12-31 NOTE — TELEPHONE ENCOUNTER
Patient Information     Patient Name MRN Sex Justino Wu [id number] Male 1945      Telephone Encounter by Fermín Mobley MD at 11/15/2017  9:32 AM     Author:  Fermín Mobley MD Service:  (none) Author Type:  Physician     Filed:  11/15/2017  9:36 AM Encounter Date:  11/15/2017 Status:  Signed     :  Fermín Mobley MD (Physician)            I called him about quite elevated CT calcium score over 3000. Should start aspirin 81 mg each evening and start on Lipitor 40 mg each evening. Letter sent.         Make an appointment follow up with your primary care physician within 2 weeks of your surgery.

## 2025-02-04 SDOH — HEALTH STABILITY: PHYSICAL HEALTH: ON AVERAGE, HOW MANY DAYS PER WEEK DO YOU ENGAGE IN MODERATE TO STRENUOUS EXERCISE (LIKE A BRISK WALK)?: 6 DAYS

## 2025-02-04 SDOH — HEALTH STABILITY: PHYSICAL HEALTH: ON AVERAGE, HOW MANY MINUTES DO YOU ENGAGE IN EXERCISE AT THIS LEVEL?: 20 MIN

## 2025-02-04 ASSESSMENT — SOCIAL DETERMINANTS OF HEALTH (SDOH): HOW OFTEN DO YOU GET TOGETHER WITH FRIENDS OR RELATIVES?: THREE TIMES A WEEK

## 2025-02-07 ENCOUNTER — OFFICE VISIT (OUTPATIENT)
Dept: FAMILY MEDICINE | Facility: OTHER | Age: 80
End: 2025-02-07
Attending: FAMILY MEDICINE
Payer: MEDICARE

## 2025-02-07 VITALS
OXYGEN SATURATION: 95 % | DIASTOLIC BLOOD PRESSURE: 80 MMHG | BODY MASS INDEX: 36.32 KG/M2 | HEIGHT: 66 IN | RESPIRATION RATE: 16 BRPM | TEMPERATURE: 96.8 F | SYSTOLIC BLOOD PRESSURE: 138 MMHG | HEART RATE: 75 BPM | WEIGHT: 226 LBS

## 2025-02-07 DIAGNOSIS — E66.812 CLASS 2 SEVERE OBESITY WITH BODY MASS INDEX (BMI) OF 35 TO 39.9 WITH SERIOUS COMORBIDITY (H): ICD-10-CM

## 2025-02-07 DIAGNOSIS — N40.1 BENIGN PROSTATIC HYPERPLASIA WITH URINARY FREQUENCY: ICD-10-CM

## 2025-02-07 DIAGNOSIS — Z00.00 ENCOUNTER FOR MEDICARE ANNUAL WELLNESS EXAM: Primary | ICD-10-CM

## 2025-02-07 DIAGNOSIS — R93.1 ELEVATED CORONARY ARTERY CALCIUM SCORE: ICD-10-CM

## 2025-02-07 DIAGNOSIS — E66.01 CLASS 2 SEVERE OBESITY WITH BODY MASS INDEX (BMI) OF 35 TO 39.9 WITH SERIOUS COMORBIDITY (H): ICD-10-CM

## 2025-02-07 DIAGNOSIS — E89.0 HYPOTHYROIDISM FOLLOWING RADIOIODINE THERAPY: ICD-10-CM

## 2025-02-07 DIAGNOSIS — C43.59 MALIGNANT MELANOMA OF TORSO EXCLUDING BREAST (H): ICD-10-CM

## 2025-02-07 DIAGNOSIS — R35.0 BENIGN PROSTATIC HYPERPLASIA WITH URINARY FREQUENCY: ICD-10-CM

## 2025-02-07 DIAGNOSIS — Z71.85 VACCINE COUNSELING: ICD-10-CM

## 2025-02-07 DIAGNOSIS — Z13.220 SCREENING FOR HYPERLIPIDEMIA: ICD-10-CM

## 2025-02-07 LAB
ALBUMIN SERPL BCG-MCNC: 4.3 G/DL (ref 3.5–5.2)
ALP SERPL-CCNC: 118 U/L (ref 40–150)
ALT SERPL W P-5'-P-CCNC: 15 U/L (ref 0–70)
ANION GAP SERPL CALCULATED.3IONS-SCNC: 11 MMOL/L (ref 7–15)
AST SERPL W P-5'-P-CCNC: 20 U/L (ref 0–45)
BILIRUB SERPL-MCNC: 0.9 MG/DL
BUN SERPL-MCNC: 11.5 MG/DL (ref 8–23)
CALCIUM SERPL-MCNC: 9.4 MG/DL (ref 8.8–10.4)
CHLORIDE SERPL-SCNC: 102 MMOL/L (ref 98–107)
CHOLEST SERPL-MCNC: 158 MG/DL
CREAT SERPL-MCNC: 0.86 MG/DL (ref 0.67–1.17)
EGFRCR SERPLBLD CKD-EPI 2021: 87 ML/MIN/1.73M2
ERYTHROCYTE [DISTWIDTH] IN BLOOD BY AUTOMATED COUNT: 12.7 % (ref 10–15)
FASTING STATUS PATIENT QL REPORTED: NO
FASTING STATUS PATIENT QL REPORTED: NO
GLUCOSE SERPL-MCNC: 105 MG/DL (ref 70–99)
HCO3 SERPL-SCNC: 26 MMOL/L (ref 22–29)
HCT VFR BLD AUTO: 42 % (ref 40–53)
HDLC SERPL-MCNC: 73 MG/DL
HGB BLD-MCNC: 14.7 G/DL (ref 13.3–17.7)
LDLC SERPL CALC-MCNC: 74 MG/DL
MCH RBC QN AUTO: 33.3 PG (ref 26.5–33)
MCHC RBC AUTO-ENTMCNC: 35 G/DL (ref 31.5–36.5)
MCV RBC AUTO: 95 FL (ref 78–100)
NONHDLC SERPL-MCNC: 85 MG/DL
PLATELET # BLD AUTO: 273 10E3/UL (ref 150–450)
POTASSIUM SERPL-SCNC: 4.3 MMOL/L (ref 3.4–5.3)
PROT SERPL-MCNC: 7.6 G/DL (ref 6.4–8.3)
RBC # BLD AUTO: 4.42 10E6/UL (ref 4.4–5.9)
SODIUM SERPL-SCNC: 139 MMOL/L (ref 135–145)
TRIGL SERPL-MCNC: 53 MG/DL
TSH SERPL DL<=0.005 MIU/L-ACNC: 1.72 UIU/ML (ref 0.3–4.2)
WBC # BLD AUTO: 6.1 10E3/UL (ref 4–11)

## 2025-02-07 PROCEDURE — 80061 LIPID PANEL: CPT | Mod: ZL | Performed by: FAMILY MEDICINE

## 2025-02-07 PROCEDURE — 82040 ASSAY OF SERUM ALBUMIN: CPT | Mod: ZL | Performed by: FAMILY MEDICINE

## 2025-02-07 PROCEDURE — 36415 COLL VENOUS BLD VENIPUNCTURE: CPT | Mod: ZL | Performed by: FAMILY MEDICINE

## 2025-02-07 PROCEDURE — 85014 HEMATOCRIT: CPT | Mod: ZL | Performed by: FAMILY MEDICINE

## 2025-02-07 PROCEDURE — 84443 ASSAY THYROID STIM HORMONE: CPT | Mod: ZL | Performed by: FAMILY MEDICINE

## 2025-02-07 PROCEDURE — G0463 HOSPITAL OUTPT CLINIC VISIT: HCPCS | Performed by: FAMILY MEDICINE

## 2025-02-07 PROCEDURE — 82310 ASSAY OF CALCIUM: CPT | Mod: ZL | Performed by: FAMILY MEDICINE

## 2025-02-07 PROCEDURE — 80053 COMPREHEN METABOLIC PANEL: CPT | Mod: ZL | Performed by: FAMILY MEDICINE

## 2025-02-07 PROCEDURE — 85041 AUTOMATED RBC COUNT: CPT | Mod: ZL | Performed by: FAMILY MEDICINE

## 2025-02-07 RX ORDER — LEVOTHYROXINE SODIUM 100 UG/1
100 TABLET ORAL
Qty: 90 TABLET | Refills: 4 | Status: SHIPPED | OUTPATIENT
Start: 2025-02-07

## 2025-02-07 RX ORDER — TAMSULOSIN HYDROCHLORIDE 0.4 MG/1
0.4 CAPSULE ORAL DAILY
Qty: 90 CAPSULE | Refills: 4 | Status: SHIPPED | OUTPATIENT
Start: 2025-02-07 | End: 2025-02-07

## 2025-02-07 RX ORDER — ATORVASTATIN CALCIUM 80 MG/1
80 TABLET, FILM COATED ORAL
Qty: 90 TABLET | Refills: 4 | Status: SHIPPED | OUTPATIENT
Start: 2025-02-07

## 2025-02-07 RX ORDER — BETAMETHASONE DIPROPIONATE 0.5 MG/G
CREAM TOPICAL PRN
COMMUNITY
Start: 2024-07-17

## 2025-02-07 RX ORDER — TAMSULOSIN HYDROCHLORIDE 0.4 MG/1
0.8 CAPSULE ORAL DAILY
Qty: 180 CAPSULE | Refills: 4 | Status: SHIPPED | OUTPATIENT
Start: 2025-02-07

## 2025-02-07 ASSESSMENT — PAIN SCALES - GENERAL: PAINLEVEL_OUTOF10: MILD PAIN (2)

## 2025-02-07 NOTE — PROGRESS NOTES
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  Plan: levothyroxine (SYNTHROID/LEVOTHROID) 100 MCG         tablet, TSH with free T4 reflex        Refilled without changes    (Z71.85) Vaccine counseling  Comment:    Plan:      (N40.1,  R35.0) Benign prostatic hyperplasia with urinary frequency  Comment: given significant nocturia, will increase dose to 2 qhs  Plan: tamsulosin (FLOMAX) 0.4 MG capsule,                      (Z13.220) Screening for hyperlipidemia  Comment:    Plan: Lipid Profile             (E66.812,  E66.01) Class 2 severe obesity with body mass index (BMI) of 35 to 39.9 with serious comorbidity (H)  Comment: discussed with him weight loss to improve his ascvd risk mostly. Has no other real symptoms from obesity  Plan:      (C43.59) Malignant melanoma of torso excluding breast (H)  Comment: followed by derm.  Plan:  is now at every 6 month skin checks            The longitudinal plan of care for the diagnosis(es)/condition(s) as documented were addressed during this visit. Due to the added complexity in care, I will continue to support Alvaro in the subsequent management and with ongoing continuity of care.      Counseling  Appropriate preventive services were addressed with this patient via screening, questionnaire, or discussion as appropriate for fall prevention, nutrition, physical activity, Tobacco-use cessation, social engagement, weight loss and cognition.  Checklist reviewing preventive services available has been given to the patient.  Reviewed patient's diet, addressing  "concerns and/or questions.   The patient was instructed to see the dentist every 6 months.   The patient was provided with written information regarding signs of hearing loss.   Information on urinary incontinence and treatment options given to patient.           No follow-ups on file.    Alena John is a 79 year old, presenting for the following health issues:  Medicare Visit      2/7/2025     2:34 PM   Additional Questions   Roomed by NIEVES Pelayo     HPI feeling good overall. Needs follow up on his thyroid and BPH. Normal energy level. No tremors. No diarrhea.     Had a melanoma in the left lower back area. Was excised 2 years ago. No new skin lesions since. Sees derm every 6 months now.     Has bph. Drinks lots of water, has dizziness if hs skips it. Some polyuria. Nocturia is about 4 times.     Current Outpatient Medications   Medication Sig Dispense Refill    acetaminophen (TYLENOL) 500 MG tablet Take 1 tablet (500 mg) by mouth every 4 hours as needed for other (mild pain)      aspirin EC 81 MG EC tablet Take 81 mg by mouth daily with food Take 1 tablet by mouth once daily with a meal.      atorvastatin (LIPITOR) 80 MG tablet TAKE 1 TABLET (80 MG) BY MOUTH DAILY (WITH DINNER) 90 tablet 0    betamethasone dipropionate (DIPROSONE) 0.05 % external cream Apply topically as needed.      levothyroxine (SYNTHROID/LEVOTHROID) 100 MCG tablet Take 1 tablet (100 mcg) by mouth every morning (before breakfast) Take 1 tablet by mouth before breakfast. 90 tablet 4    tamsulosin (FLOMAX) 0.4 MG capsule TAKE 1 CAPSULE BY MOUTH EVERY DAY 90 capsule 0     No current facility-administered medications for this visit.                       Objective    /80   Pulse 75   Temp 96.8  F (36  C)   Resp 16   Ht 1.683 m (5' 6.25\")   Wt 102.5 kg (226 lb)   SpO2 95%   BMI 36.20 kg/m    Body mass index is 36.2 kg/m .  Physical Exam   GENERAL: alert and no distress  EYES: Eyes grossly normal to inspection, PERRL and " conjunctivae and sclerae normal  HENT: ear canals and TM's normal, nose and mouth without ulcers or lesions  NECK: no adenopathy, no asymmetry, masses, or scars  RESP: lungs clear to auscultation - no rales, rhonchi or wheezes  CV: regular rate and rhythm, normal S1 S2, no S3 or S4, no murmur, click or rub, no peripheral edema  ABDOMEN: soft, nontender, no hepatosplenomegaly, no masses and bowel sounds normal  MS: no gross musculoskeletal defects noted, no edema  SKIN: no suspicious lesions or rashes  NEURO: Normal strength and tone, mentation intact and speech normal  PSYCH: mentation appears normal, affect normal/bright    Results for orders placed or performed in visit on 02/07/25   CBC with platelets     Status: Abnormal   Result Value Ref Range    WBC Count 6.1 4.0 - 11.0 10e3/uL    RBC Count 4.42 4.40 - 5.90 10e6/uL    Hemoglobin 14.7 13.3 - 17.7 g/dL    Hematocrit 42.0 40.0 - 53.0 %    MCV 95 78 - 100 fL    MCH 33.3 (H) 26.5 - 33.0 pg    MCHC 35.0 31.5 - 36.5 g/dL    RDW 12.7 10.0 - 15.0 %    Platelet Count 273 150 - 450 10e3/uL   Comprehensive metabolic panel     Status: Abnormal   Result Value Ref Range    Sodium 139 135 - 145 mmol/L    Potassium 4.3 3.4 - 5.3 mmol/L    Carbon Dioxide (CO2) 26 22 - 29 mmol/L    Anion Gap 11 7 - 15 mmol/L    Urea Nitrogen 11.5 8.0 - 23.0 mg/dL    Creatinine 0.86 0.67 - 1.17 mg/dL    GFR Estimate 87 >60 mL/min/1.73m2    Calcium 9.4 8.8 - 10.4 mg/dL    Chloride 102 98 - 107 mmol/L    Glucose 105 (H) 70 - 99 mg/dL    Alkaline Phosphatase 118 40 - 150 U/L    AST 20 0 - 45 U/L    ALT 15 0 - 70 U/L    Protein Total 7.6 6.4 - 8.3 g/dL    Albumin 4.3 3.5 - 5.2 g/dL    Bilirubin Total 0.9 <=1.2 mg/dL    Patient Fasting > 8hrs? No    Lipid Profile     Status: None   Result Value Ref Range    Cholesterol 158 <200 mg/dL    Triglycerides 53 <150 mg/dL    Direct Measure HDL 73 >=40 mg/dL    LDL Cholesterol Calculated 74 <100 mg/dL    Non HDL Cholesterol 85 <130 mg/dL    Patient Fasting >  8hrs? No     Narrative    Cholesterol  Desirable: < 200 mg/dL  Borderline High: 200 - 239 mg/dL  High: >= 240 mg/dL    Triglycerides  Normal: < 150 mg/dL  Borderline High: 150 - 199 mg/dL  High: 200-499 mg/dL  Very High: >= 500 mg/dL    Direct Measure HDL  Female: >= 50 mg/dL   Male: >= 40 mg/dL    LDL Cholesterol  Desirable: < 100 mg/dL  Above Desirable: 100 - 129 mg/dL   Borderline High: 130 - 159 mg/dL   High:  160 - 189 mg/dL   Very High: >= 190 mg/dL    Non HDL Cholesterol  Desirable: < 130 mg/dL  Above Desirable: 130 - 159 mg/dL  Borderline High: 160 - 189 mg/dL  High: 190 - 219 mg/dL  Very High: >= 220 mg/dL   TSH with free T4 reflex     Status: Normal   Result Value Ref Range    TSH 1.72 0.30 - 4.20 uIU/mL             Signed Electronically by: Daniel Mauricio MD

## 2025-02-07 NOTE — PATIENT INSTRUCTIONS
Bedside and Verbal shift change report given to Shree BISHOP RN (oncoming nurse) by Sridhar Leung RN (offgoing nurse). Report included the following information SBAR, Kardex, ED Summary, [facility] and Recent Results. Patient Education   Preventive Care Advice   This is general advice given by our system to help you stay healthy. However, your care team may have specific advice just for you. Please talk to your care team about your preventive care needs.  Nutrition  Eat 5 or more servings of fruits and vegetables each day.  Try wheat bread, brown rice and whole grain pasta (instead of white bread, rice, and pasta).  Get enough calcium and vitamin D. Check the label on foods and aim for 100% of the RDA (recommended daily allowance).  Lifestyle  Exercise at least 150 minutes each week  (30 minutes a day, 5 days a week).  Do muscle strengthening activities 2 days a week. These help control your weight and prevent disease.  No smoking.  Wear sunscreen to prevent skin cancer.  Have a dental exam and cleaning every 6 months.  Yearly exams  See your health care team every year to talk about:  Any changes in your health.  Any medicines your care team has prescribed.  Preventive care, family planning, and ways to prevent chronic diseases.  Shots (vaccines)   HPV shots (up to age 26), if you've never had them before.  Hepatitis B shots (up to age 59), if you've never had them before.  COVID-19 shot: Get this shot when it's due.  Flu shot: Get a flu shot every year.  Tetanus shot: Get a tetanus shot every 10 years.  Pneumococcal, hepatitis A, and RSV shots: Ask your care team if you need these based on your risk.  Shingles shot (for age 50 and up)  General health tests  Diabetes screening:  Starting at age 35, Get screened for diabetes at least every 3 years.  If you are younger than age 35, ask your care team if you should be screened for diabetes.  Cholesterol test: At age 39, start having a cholesterol test every 5 years, or more often if advised.  Bone density scan (DEXA): At age 50, ask your care team if you should have this scan for osteoporosis (brittle bones).  Hepatitis C: Get tested at least once in your life.  STIs (sexually  transmitted infections)  Before age 24: Ask your care team if you should be screened for STIs.  After age 24: Get screened for STIs if you're at risk. You are at risk for STIs (including HIV) if:  You are sexually active with more than one person.  You don't use condoms every time.  You or a partner was diagnosed with a sexually transmitted infection.  If you are at risk for HIV, ask about PrEP medicine to prevent HIV.  Get tested for HIV at least once in your life, whether you are at risk for HIV or not.  Cancer screening tests  Cervical cancer screening: If you have a cervix, begin getting regular cervical cancer screening tests starting at age 21.  Breast cancer scan (mammogram): If you've ever had breasts, begin having regular mammograms starting at age 40. This is a scan to check for breast cancer.  Colon cancer screening: It is important to start screening for colon cancer at age 45.  Have a colonoscopy test every 10 years (or more often if you're at risk) Or, ask your provider about stool tests like a FIT test every year or Cologuard test every 3 years.  To learn more about your testing options, visit:   .  For help making a decision, visit:   https://bit.ly/bq44730.  Prostate cancer screening test: If you have a prostate, ask your care team if a prostate cancer screening test (PSA) at age 55 is right for you.  Lung cancer screening: If you are a current or former smoker ages 50 to 80, ask your care team if ongoing lung cancer screenings are right for you.  For informational purposes only. Not to replace the advice of your health care provider. Copyright   2023 [facility] [employer]. All rights reserved. Clinically reviewed by the [facility] Transitions Program. [employer] 074524 - REV 01/24.  Hearing Loss: Care Instructions  Overview     Hearing loss is a sudden or slow decrease in how well you hear. It can range from slight to profound. Permanent hearing loss can occur with aging. It also can  happen when you are exposed long-term to loud noise. Examples include listening to loud music, riding motorcycles, or being around other loud machines.  Hearing loss can affect your work and home life. It can make you feel lonely or depressed. You may feel that you have lost your independence. But hearing aids and other devices can help you hear better and feel connected to others.  Follow-up care is a key part of your treatment and safety. Be sure to make and go to all appointments, and call your doctor if you are having problems. It's also a good idea to know your test results and keep a list of the medicines you take.  How can you care for yourself at home?  Avoid loud noises whenever possible. This helps keep your hearing from getting worse.  Always wear hearing protection around loud noises.  Wear a hearing aid as directed.  A professional can help you pick a hearing aid that will work best for you.  You can also get hearing aids over the counter for mild to moderate hearing loss.  Have hearing tests as your doctor suggests. They can show whether your hearing has changed. Your hearing aid may need to be adjusted.  Use other devices as needed. These may include:  Telephone amplifiers and hearing aids that can connect to a television, stereo, radio, or microphone.  Devices that use lights or vibrations. These alert you to the doorbell, a ringing telephone, or a baby monitor.  Television closed-captioning. This shows the words at the bottom of the screen. Most new TVs can do this.  TTY (text telephone). This lets you type messages back and forth on the telephone instead of talking or listening. These devices are also called TDD. When messages are typed on the keyboard, they are sent over the phone line to a receiving TTY. The message is shown on a monitor.  Use text messaging, social media, and email if it is hard for you to communicate by telephone.  Try to learn a listening technique called speechreading. It is  "not lipreading. You pay attention to people's gestures, expressions, posture, and tone of voice. These clues can help you understand what a person is saying. Face the person you are talking to, and have them face you. Make sure the lighting is good. You need to see the other person's face clearly.  Think about counseling if you need help to adjust to your hearing loss.  When should you call for help?  Watch closely for changes in your health, and be sure to contact your doctor if:    You think your hearing is getting worse.     You have new symptoms, such as dizziness or nausea.   Where can you learn more?  Go to https://www.[employer].net/patiented  Enter R798 in the search box to learn more about \"Hearing Loss: Care Instructions.\"  Current as of: September 27, 2023  Content Version: 14.3    2024 [employer].   Care instructions adapted under license by your healthcare professional. If you have questions about a medical condition or this instruction, always ask your healthcare professional. [employer] disclaims any warranty or liability for your use of this information.    Bladder Training: Care Instructions  Your Care Instructions     Bladder training is used to treat urge incontinence and stress incontinence. Urge incontinence means that the need to urinate comes on so fast that you can't get to a toilet in time. Stress incontinence means that you leak urine because of pressure on your bladder. For example, it may happen when you laugh, cough, or lift something heavy.  Bladder training can increase how long you can wait before you have to urinate. It can also help your bladder hold more urine. And it can give you better control over the urge to urinate.  It is important to remember that bladder training takes a few weeks to a few months to make a difference. You may not see results right away, but don't give up.  Follow-up care is a key part of your treatment and safety. Be sure to make and " go to all appointments, and call your doctor if you are having problems. It's also a good idea to know your test results and keep a list of the medicines you take.  How can you care for yourself at home?  Work with your doctor to come up with a bladder training program that is right for you. You may use one or more of the following methods.  Delayed urination  In the beginning, try to keep from urinating for 5 minutes after you first feel the need to go.  While you wait, take deep, slow breaths to relax. Kegel exercises can also help you delay the need to go to the bathroom.  After some practice, when you can easily wait 5 minutes to urinate, try to wait 10 minutes before you urinate.  Slowly increase the waiting period until you are able to control when you have to urinate.  Scheduled urination  Empty your bladder when you first wake up in the morning.  Schedule times throughout the day when you will urinate.  Start by going to the bathroom every hour, even if you don't need to go.  Slowly increase the time between trips to the bathroom.  When you have found a schedule that works well for you, keep doing it.  If you wake up during the night and have to urinate, do it. Apply your schedule to waking hours only.  Kegel exercises  These tighten and strengthen pelvic muscles, which can help you control the flow of urine. (If doing these exercises causes pain, stop doing them and talk with your doctor.) To do Kegel exercises:  Squeeze your muscles as if you were trying not to pass gas. Or squeeze your muscles as if you were stopping the flow of urine. Your belly, legs, and buttocks shouldn't move.  Hold the squeeze for 3 seconds, then relax for 5 to 10 seconds.  Start with 3 seconds, then add 1 second each week until you are able to squeeze for 10 seconds.  Repeat the exercise 10 times a session. Do 3 to 8 sessions a day.  When should you call for help?  Watch closely for changes in your health, and be sure to contact  "your doctor if:    Your incontinence is getting worse.     You do not get better as expected.   Where can you learn more?  Go to https://www.[employer].net/patiented  Enter V684 in the search box to learn more about \"Bladder Training: Care Instructions.\"  Current as of: April 30, 2024  Content Version: 14.3    2024 [employer].   Care instructions adapted under license by your healthcare professional. If you have questions about a medical condition or this instruction, always ask your healthcare professional. [employer] disclaims any warranty or liability for your use of this information.       "

## 2025-02-07 NOTE — PROGRESS NOTES
"Preventive Care Visit  [facility]  Dnaiel Mauricio MD, Family Medicine  Feb 7, 2025        Encounter for Medicare annual wellness exam    -Colon cancer screening done via colonoscopy on 5/6/21 (impression: WNL). Follow-up no longer indicated.     -PSA lab work not on file.    -Immunizations: Patient is due for the following: N/A, current on vaccinations.    -Derm: Does patient regularly see dermatologist? Yes, every 6 months.    -Refills pended for requested medications.    -Labs pended.     BMI  Estimated body mass index is 36.2 kg/m  as calculated from the following:    Height as of this encounter: 1.683 m (5' 6.25\").    Weight as of this encounter: 102.5 kg (226 lb).   Weight management plan: Discussed healthy diet and exercise guidelines    Counseling  Appropriate preventive services were addressed with this patient via screening, questionnaire, or discussion as appropriate for fall prevention, nutrition, physical activity, Tobacco-use cessation, social engagement, weight loss and cognition.  Checklist reviewing preventive services available has been given to the patient.  Reviewed patient's diet, addressing concerns and/or questions.   The patient was instructed to see the dentist every 6 months.   The patient was provided with written information regarding signs of hearing loss.   Information on urinary incontinence and treatment options given to patient.     Work on weight loss  Regular exercise    No follow-ups on file.    Alena   Alvaro is a 79 year old, presenting for the following:  Medicare Visit        2/7/2025     2:34 PM   Additional Questions   Roomed by NIEVES Pelayo         Health Care Directive  Patient has a Health Care Directive on file.        2/4/2025   General Health   How would you rate your overall physical health? Excellent   Feel stress (tense, anxious, or unable to sleep) Only a little   (!) STRESS CONCERN      2/4/2025   Nutrition   Diet: Regular (no restrictions) "         2/4/2025   Exercise   Days per week of moderate/strenous exercise 6 days   Average minutes spent exercising at this level 20 min         2/4/2025   Social Factors   Frequency of gathering with friends or relatives Three times a week   Worry food won't last until get money to buy more No   Food not last or not have enough money for food? No   Do you have housing? (Housing is defined as stable permanent housing and does not include staying ouside in a car, in a tent, in an abandoned building, in an overnight shelter, or couch-surfing.) Yes   Are you worried about losing your housing? No   Lack of transportation? No   Unable to get utilities (heat,electricity)? No         2/4/2025   Fall Risk   Fallen 2 or more times in the past year? No   Trouble with walking or balance? No          2/4/2025   Activities of Daily Living- Home Safety   Needs help with the following daily activites None of the above   Safety concerns in the home None of the above         2/4/2025   Dental   Dentist two times every year? (!) NO         2/4/2025   Hearing Screening   Hearing concerns? (!) I NEED TO ASK PEOPLE TO SPEAK UP OR REPEAT THEMSELVES.    (!) TROUBLE UNDERSTANDING SOFT OR WHISPERED SPEECH.       Multiple values from one day are sorted in reverse-chronological order         2/4/2025   Driving Risk Screening   Patient/family members have concerns about driving No         2/4/2025   General Alertness/Fatigue Screening   Have you been more tired than usual lately? No         2/4/2025   Urinary Incontinence Screening   Bothered by leaking urine in past 6 months Yes         2/4/2025   TB Screening   Were you born outside of the [place]? No     Today's PHQ-2 Score:       2/7/2025     2:21 PM   PHQ-2 ( 1999 [employer])   Q1: Little interest or pleasure in doing things 0   Q2: Feeling down, depressed or hopeless 0   PHQ-2 Score 0    Q1: Little interest or pleasure in doing things Not at all   Q2: Feeling down, depressed or hopeless Not at  "all   PHQ-2 Score 0       Patient-reported         2/4/2025   Substance Use   Alcohol more than 3/day or more than 7/wk No   Do you have a current opioid prescription? No   How severe/bad is pain from 1 to 10? 2/10   Do you use any other substances recreationally? No     Social History     Tobacco Use    Smoking status: Never    Smokeless tobacco: Never   Vaping Use    Vaping status: Never Used   Substance Use Topics    Alcohol use: Yes     Alcohol/week: 12.5 standard drinks of alcohol     Comment: 1 shot and 1 glass of wine daily.    Drug use: No              Reviewed and updated as needed this visit by Provider                  Current providers sharing in care for this patient include:  Patient Care Team:  Daniel Mauricio MD as PCP - General (Family Medicine)  Lindsay Humphrey APRN CNP as Nurse Practitioner (Nurse Practitioner Primary Care)  Terry Reich MD as MD (Orthopaedic Surgery)  Aris Ann MD as MD (Orthopaedic Surgery)  Terry Reich MD as Assigned Musculoskeletal Provider  Too Wild MD as Assigned PCP    The following health maintenance items are reviewed in Epic and correct as of today:  Health Maintenance   Topic Date Due    LIPID  01/18/2025    TSH W/FREE T4 REFLEX  01/18/2025    COVID-19 Vaccine (8 - 2024-25 season) 04/09/2025    MEDICARE ANNUAL WELLNESS VISIT  02/07/2026    FALL RISK ASSESSMENT  02/07/2026    COLORECTAL CANCER SCREENING  05/06/2026    ADVANCE CARE PLANNING  01/19/2029    DTAP/TDAP/TD IMMUNIZATION (5 - Td or Tdap) 01/23/2034    PHQ-2 (once per calendar year)  Completed    INFLUENZA VACCINE  Completed    Pneumococcal Vaccine: 50+ Years  Completed    ZOSTER IMMUNIZATION  Completed    RSV VACCINE  Completed    HPV IMMUNIZATION  Aged Out    MENINGITIS IMMUNIZATION  Aged Out      Objective      Exam  /80   Pulse 75   Temp 96.8  F (36  C)   Resp 16   Ht 1.683 m (5' 6.25\")   Wt 102.5 kg (226 lb)   SpO2 95%   BMI 36.20 kg/m           2/7/2025   Mini Cog "   Clock Draw Score 2 Normal   3 Item Recall 2 objects recalled   Mini Cog Total Score 4          Signed Electronically by: Daniel Mauricio MD

## (undated) DEVICE — GLOVE BIOGEL PI INDICATOR 8.0 LF 41680

## (undated) DEVICE — SU SILK 2-0 SH 30" K833H

## (undated) DEVICE — HANDPIECE INTERPULSE W/HIGH FLOW TIP & SUCTION

## (undated) DEVICE — PAD FLOOR SURGISAFE 46X40" 84610

## (undated) DEVICE — DRAPE TOP SURGICAL HD 59352

## (undated) DEVICE — GLOVE PROTEXIS PI ORTHO PF 7.5 2D73HT75

## (undated) DEVICE — HOOD T4 PROTECTIVE STERI FACE SHIELD 400-800

## (undated) DEVICE — SU VICRYL 3-0 SH 27" UND J416H

## (undated) DEVICE — DRSG GAUZE 4X4" 3033

## (undated) DEVICE — PACK MAJOR ORTHO SOP15MOFCA

## (undated) DEVICE — SU VICRYL 2-0 CT-1 36" UND J945H

## (undated) DEVICE — SOL WATER 1500ML

## (undated) DEVICE — SU VICRYL 1 CT-1 36" J347H

## (undated) DEVICE — SU MONOCRYL 4-0 PS-2 27" UND Y426H

## (undated) DEVICE — COVER TABLE L60 IN 2 TIER BACK STRL 8197-

## (undated) DEVICE — PREP CHLORAPREP 26ML TINTED ORANGE  260815

## (undated) DEVICE — SU VICRYL 2-0 SH 27" UND J417H

## (undated) DEVICE — STPL SKIN PRECISE SYSTEM DS-15

## (undated) DEVICE — ESU ELEC BLADE 6" COATED E1450-6

## (undated) DEVICE — CLIP ETHICON LIGACLIP MED WHITE LT200

## (undated) DEVICE — ESU PENCIL SMOKE EVAC W/ROCKER SWITCH 0703-047-000

## (undated) DEVICE — SUCTION MANIFOLD NEPTUNE 2 SYS 4 PORT 0702-020-000

## (undated) DEVICE — PEN MARKING SKIN W/LABELS 31145918

## (undated) DEVICE — SU DERMABOND ADVANCED .7ML DNX12

## (undated) DEVICE — GLOVE PROTEXIS POWDER FREE SMT 7.5  2D72PT75X

## (undated) DEVICE — ESU GROUND PAD ADULT W/CORD E7507

## (undated) DEVICE — PACK MAJOR LAPAROTOMY LF SBA15MLFCA

## (undated) DEVICE — NEEDLE 27G X 1 1/2 IN

## (undated) DEVICE — ESU SUCTION COAG CAUTERY HAND CONTROL 10FR 6" 30-5200

## (undated) DEVICE — GLOVE BIOGEL 7 LATEX

## (undated) DEVICE — SU VICRYL 3-0 CT-3 27" J327H

## (undated) DEVICE — BLADE SAW OSCIL/SAG STRK 25X90X1.27MM 4125-127-090

## (undated) DEVICE — DRAPE TOWEL 17X27" BLUE LF DISP 28700-004

## (undated) DEVICE — Device

## (undated) DEVICE — DRSG AQUACEL AG 3.5X12" HYDROFIBER 420670

## (undated) DEVICE — SLEEVE COMPRESSION SCD KNEE MED 74022

## (undated) DEVICE — TUBING SUCTION 10'X3/16" N510

## (undated) DEVICE — SU DERMABOND MINI DHVM12

## (undated) DEVICE — COVER LIGHT HANDLE LT-F02

## (undated) DEVICE — DRAPE STERI U 1015

## (undated) DEVICE — DRAPE U SHEET SPLIT W/TAPE 89079

## (undated) DEVICE — PENCIL MEGADYNE TELESCOPING SMOKE EVACUATION 10 FT 251010J

## (undated) DEVICE — DRAPE C-ARM PACK 9"

## (undated) DEVICE — DRAPE IOBAN ISOLATION VERTICAL 320X21CM 6617

## (undated) DEVICE — ESU BIPOLAR SEALER AQUAMANTYS 6MM 23-112-1

## (undated) RX ORDER — LIDOCAINE HYDROCHLORIDE AND EPINEPHRINE 10; 10 MG/ML; UG/ML
INJECTION, SOLUTION INFILTRATION; PERINEURAL
Status: DISPENSED
Start: 2023-02-22

## (undated) RX ORDER — FENTANYL CITRATE 50 UG/ML
INJECTION, SOLUTION INTRAMUSCULAR; INTRAVENOUS
Status: DISPENSED
Start: 2023-11-07

## (undated) RX ORDER — CEFAZOLIN SODIUM/WATER 2 G/20 ML
SYRINGE (ML) INTRAVENOUS
Status: DISPENSED
Start: 2023-11-07

## (undated) RX ORDER — DEXAMETHASONE SODIUM PHOSPHATE 4 MG/ML
INJECTION, SOLUTION INTRA-ARTICULAR; INTRALESIONAL; INTRAMUSCULAR; INTRAVENOUS; SOFT TISSUE
Status: DISPENSED
Start: 2023-11-07

## (undated) RX ORDER — ISOSULFAN BLUE 50 MG/5ML
INJECTION, SOLUTION SUBCUTANEOUS
Status: DISPENSED
Start: 2023-02-22

## (undated) RX ORDER — TRANEXAMIC ACID 650 MG/1
TABLET ORAL
Status: DISPENSED
Start: 2023-11-07

## (undated) RX ORDER — GINSENG 100 MG
CAPSULE ORAL
Status: DISPENSED
Start: 2018-07-10

## (undated) RX ORDER — GLYCOPYRROLATE 0.2 MG/ML
INJECTION, SOLUTION INTRAMUSCULAR; INTRAVENOUS
Status: DISPENSED
Start: 2023-02-22

## (undated) RX ORDER — CEFAZOLIN SODIUM/WATER 2 G/20 ML
SYRINGE (ML) INTRAVENOUS
Status: DISPENSED
Start: 2023-02-22

## (undated) RX ORDER — PROPOFOL 10 MG/ML
INJECTION, EMULSION INTRAVENOUS
Status: DISPENSED
Start: 2023-02-22

## (undated) RX ORDER — ONDANSETRON 2 MG/ML
INJECTION INTRAMUSCULAR; INTRAVENOUS
Status: DISPENSED
Start: 2023-02-22

## (undated) RX ORDER — ACETAMINOPHEN 325 MG/1
TABLET ORAL
Status: DISPENSED
Start: 2023-02-22

## (undated) RX ORDER — ACETAMINOPHEN 325 MG/1
TABLET ORAL
Status: DISPENSED
Start: 2023-11-07

## (undated) RX ORDER — CEFTRIAXONE SODIUM 1 G
VIAL (EA) INJECTION
Status: DISPENSED
Start: 2018-07-10

## (undated) RX ORDER — ONDANSETRON 2 MG/ML
INJECTION INTRAMUSCULAR; INTRAVENOUS
Status: DISPENSED
Start: 2023-11-07

## (undated) RX ORDER — FENTANYL CITRATE 50 UG/ML
INJECTION, SOLUTION INTRAMUSCULAR; INTRAVENOUS
Status: DISPENSED
Start: 2023-02-22

## (undated) RX ORDER — PROPOFOL 10 MG/ML
INJECTION, EMULSION INTRAVENOUS
Status: DISPENSED
Start: 2023-11-07

## (undated) RX ORDER — SEVOFLURANE 250 ML/250ML
LIQUID RESPIRATORY (INHALATION)
Status: DISPENSED
Start: 2023-02-22

## (undated) RX ORDER — KETOROLAC TROMETHAMINE 30 MG/ML
INJECTION, SOLUTION INTRAMUSCULAR; INTRAVENOUS
Status: DISPENSED
Start: 2023-02-22

## (undated) RX ORDER — LIDOCAINE HYDROCHLORIDE 10 MG/ML
INJECTION, SOLUTION EPIDURAL; INFILTRATION; INTRACAUDAL; PERINEURAL
Status: DISPENSED
Start: 2018-07-10

## (undated) RX ORDER — DEXAMETHASONE SODIUM PHOSPHATE 4 MG/ML
INJECTION, SOLUTION INTRA-ARTICULAR; INTRALESIONAL; INTRAMUSCULAR; INTRAVENOUS; SOFT TISSUE
Status: DISPENSED
Start: 2023-02-22